# Patient Record
Sex: MALE | Race: BLACK OR AFRICAN AMERICAN | NOT HISPANIC OR LATINO | Employment: FULL TIME | URBAN - METROPOLITAN AREA
[De-identification: names, ages, dates, MRNs, and addresses within clinical notes are randomized per-mention and may not be internally consistent; named-entity substitution may affect disease eponyms.]

---

## 2018-01-12 NOTE — PROGRESS NOTES
Assessment    1  Encounter for preventive health examination (V70 0) (Z00 00)   2  BMI 29 0-29 9,adult (V85 25) (Z68 29)   3  Current every day smoker (305 1) (F17 200)    Plan  Health Maintenance    · Some eating tips that can help you lose weight ; Status:Complete;   Done: 78MTS5311   · We encourage all of our patients to exercise regularly  30 minutes of exercise or physical  activity five or more days a week is recommended for children and adults ;  Status:Complete;   Done: 11WSD1713   · We recommend you modify your diet to achieve and maintain a healthy weight  Being  underweight may increase your risk of developing health problems from vitamin and  mineral deficiencies  We recommend a balanced diet rich in fruits and vegetables  You  may also consider increasing your calorie intake by eating more frequently or adding  nuts, avocados, and low-fat cheese or milk to your meals  Please let us know  if you would like to learn more about your nutrition and calorie needs, and additional  options to help you achieve your weight goals ; Status:Complete;   Done: 37JVM6503  PMH: History of pneumonia    · Ventolin  (90 Base) MCG/ACT Inhalation Aerosol Solution; inhale 1 to 2  puffs every 6 hours as needed  SocHx: Current every day smoker    · You need to quit smoking ; Status:Complete;   Done: 71QPZ6980    Discussion/Summary  Impression: health maintenance visit  Currently, he eats a poor diet and has an adequate exercise regimen  The immunizations are up to date and as per pt - had Td 3 y ago  Advice and education were given regarding nutrition and weight loss  Rto prn  Chief Complaint  Annual PE w/forms  kss,cma      History of Present Illness  , Adult Male: The patient is being seen for a health maintenance evaluation  The last health maintenance visit was few year(s) ago  General Health: The patient's health since the last visit is described as good  He denies vision problems   He denies hearing loss  Immunizations status: up to date  Lifestyle:  He does not have a healthy diet  He has weight concerns  He exercises regularly  Screening:      Review of Systems    Constitutional: No fever or chills, feels well, no tiredness, no recent weight gain or weight loss  Eyes: No complaints of eye pain, no red eyes, no discharge from eyes, no itchy eyes  ENT: no complaints of earache, no hearing loss, no nosebleeds, no nasal discharge, no sore throat, no hoarseness  Cardiovascular: No complaints of slow heart rate, no fast heart rate, no chest pain, no palpitations, no leg claudication, no lower extremity  Respiratory: No complaints of shortness of breath, no wheezing, no cough, no SOB on exertion, no orthopnea or PND  Gastrointestinal: No complaints of abdominal pain, no constipation, no nausea or vomiting, no diarrhea or bloody stools  Genitourinary: No complaints of dysuria, no incontinence, no hesitancy, no nocturia, no genital lesion, no testicular pain  Musculoskeletal: No complaints of arthralgia, no myalgias, no joint swelling or stiffness, no limb pain or swelling  Integumentary: No complaints of skin rash or skin lesions, no itching, no skin wound, no dry skin  Neurological: No compliants of headache, no confusion, no convulsions, no numbness or tingling, no dizziness or fainting, no limb weakness, no difficulty walking  Psychiatric: Is not suicidal, no sleep disturbances, no anxiety or depression, no change in personality, no emotional problems  Endocrine: No complaints of proptosis, no hot flashes, no muscle weakness, no erectile dysfunction, no deepening of the voice, no feelings of weakness  Hematologic/Lymphatic: No complaints of swollen glands, no swollen glands in the neck, does not bleed easily, no easy bruising        Past Medical History    · History of Exercise-induced bronchospasm (061 81) (L20 718)    Surgical History    · History of Oral Surgery Tooth Extraction [place] [facility]    Family History    · Family history of diabetes mellitus (V18 0) (Z83 3)   · Family history of hypertension (V17 49) (Z82 49)    · Family history of malignant neoplasm of uterus (V16 49) (Z80 49)    · Family history of malignant neoplasm of breast (V16 3) (Z80 3)    Social History    · Current every day smoker (305 1) (F17 200)   · Current Smoker (305 1)    Current Meds   1  EpiPen 0 3 MG/0 3ML (1:1000) REGINA; as directed; Therapy: 34KZS3536 to  Requested for: 75ZLR6500 Recorded   2  Ventolin  (90 Base) MCG/ACT Inhalation Aerosol Solution; INHALE 2 PUFFS 3   TIMES DAILY AS NEEDED; Therapy: 85SLH2911 to (Last Rx:17Sep2012)  Requested for: 61NMO5081 Ordered    Allergies    1  No Known Drug Allergies    2  Bee sting    Vitals   Recorded: 44Zjn4664 09:18AM   Temperature 97 F   Heart Rate 80   Respiration 16   Systolic 437   Diastolic 76   Height 5 ft 8 5 in   Weight 199 lb 8 oz   BMI Calculated 29 89   BSA Calculated 2 05     Physical Exam    Constitutional   General appearance: No acute distress, well appearing and well nourished  overweight  Head and Face   Head and face: Normal     Eyes   Conjunctiva and lids: No erythema, swelling or discharge  Pupils and irises: Equal, round, reactive to light  Ears, Nose, Mouth, and Throat   Otoscopic examination: Tympanic membranes translucent with normal light reflex  Canals patent without erythema  Lips, teeth, and gums: Normal, good dentition  Oropharynx: Normal with no erythema, edema, exudate or lesions  Neck   Neck: Supple, symmetric, trachea midline, no masses  Thyroid: Normal, no thyromegaly  Pulmonary   Respiratory effort: No increased work of breathing or signs of respiratory distress  Auscultation of lungs: Clear to auscultation  Cardiovascular   Auscultation of heart: Normal rate and rhythm, normal S1 and S2, no murmurs      Examination of extremities for edema and/or varicosities: Normal     Abdomen Abdomen: Non-tender, no masses  Lymphatic   Palpation of lymph nodes in neck: No lymphadenopathy  Musculoskeletal   Gait and station: Normal     Inspection/palpation of joints, bones, and muscles: Normal     Skin   Skin and subcutaneous tissue: Normal without rashes or lesions  Neurologic   Cranial nerves: Cranial nerves 2-12 intact  Psychiatric   Judgment and insight: Normal     Mood and affect: Normal        Procedure    Procedure: Visual Acuity Test    Indication: routine screening  Inforrmation supplied by a Snellen chart     Results: 20/15 in both eyes without corrective device, 20/20 in the right eye without corrective device, 20/20 in the left eye without corrective device   Color vision was and the results were normal       Signatures   Electronically signed by : SRIRAM Malin ; Apr 13 2016 10:01AM EST                       (Author)

## 2018-06-06 ENCOUNTER — OFFICE VISIT (OUTPATIENT)
Dept: FAMILY MEDICINE CLINIC | Facility: CLINIC | Age: 33
End: 2018-06-06
Payer: COMMERCIAL

## 2018-06-06 VITALS
TEMPERATURE: 97.5 F | HEART RATE: 80 BPM | HEIGHT: 69 IN | BODY MASS INDEX: 34.51 KG/M2 | SYSTOLIC BLOOD PRESSURE: 120 MMHG | WEIGHT: 233 LBS | RESPIRATION RATE: 16 BRPM | DIASTOLIC BLOOD PRESSURE: 80 MMHG

## 2018-06-06 DIAGNOSIS — J03.90 TONSILLITIS: Primary | ICD-10-CM

## 2018-06-06 PROCEDURE — 99213 OFFICE O/P EST LOW 20 MIN: CPT | Performed by: FAMILY MEDICINE

## 2018-06-06 PROCEDURE — 3008F BODY MASS INDEX DOCD: CPT | Performed by: FAMILY MEDICINE

## 2018-06-06 PROCEDURE — 1036F TOBACCO NON-USER: CPT | Performed by: FAMILY MEDICINE

## 2018-06-06 RX ORDER — AMOXICILLIN 875 MG/1
875 TABLET, COATED ORAL 2 TIMES DAILY
Qty: 14 TABLET | Refills: 0 | Status: SHIPPED | OUTPATIENT
Start: 2018-06-06 | End: 2018-06-13

## 2018-06-06 RX ORDER — EPINEPHRINE 0.3 MG/.3ML
INJECTION SUBCUTANEOUS
COMMUNITY
Start: 2006-01-30 | End: 2021-09-23

## 2018-06-06 NOTE — PATIENT INSTRUCTIONS
Cigarette Smoking and Your Health   WHAT YOU NEED TO KNOW:   What are the risks to my health if I smoke tobacco?  Nicotine and other chemicals found in tobacco damage every cell in your body  Even if you are a light smoker, you have an increased risk for cancer, heart disease, and lung disease  If you are pregnant or have diabetes, smoking increases your risk for complications  What are the benefits to my health if I stop smoking? · You decrease respiratory symptoms such as coughing, wheezing, and shortness of breath  · You reduce your risk for cancers of the lung, mouth, throat, kidney, bladder, pancreas, stomach, and cervix  If you already have cancer, you increase the benefits of chemotherapy  You also reduce your risk for cancer returning or a second cancer from developing  · You reduce your risk for heart disease, blood clots, heart attack, and stroke  · You reduce your risk for lung infections, and diseases such as pneumonia, asthma, chronic bronchitis, and emphysema  · Your circulation improves  More oxygen can be delivered to your body  If you have diabetes, you lower your risk for complications, such as kidney, artery, and eye diseases  You also lower your risk for nerve damage  Nerve damage can lead to amputations, poor vision, and blindness  · You improve your body's ability to heal and to fight infections  What are the health benefits to others if I stop smoking? Tobacco is harmful to nonsmokers who breathe in your secondhand smoke  The following are ways the health of others around you may improve when you stop smoking:  · You lower the risks for lung cancer and heart disease in nonsmoking adults  · If you are pregnant, you lower the risk for miscarriage, early delivery, low birth weight, and stillbirth  You also lower your baby's risk for SIDS, obesity, developmental delay, and neurobehavioral problems, such as ADHD       · If you have children, you lower their risk for ear infections, colds, pneumonia, bronchitis, and asthma  Where can I find more information and support to stop smoking? · [employer]  Phone: 8- [phone]  Web Address: www [employer]  CARE AGREEMENT:   You have the right to help plan your care  Learn about your health condition and how it may be treated  Discuss treatment options with your caregivers to decide what care you want to receive  You always have the right to refuse treatment  The above information is an  only  It is not intended as medical advice for individual conditions or treatments  Talk to your doctor, nurse or pharmacist before following any medical regimen to see if it is safe and effective for you  © 2017 [address] Information is for End User's use only and may not be sold, redistributed or otherwise used for commercial purposes  All illustrations and images included in CareNotes® are the copyrighted property of A D [employer] M [employer] Inc  or Ajay [employer]el

## 2018-06-06 NOTE — PROGRESS NOTES
Chief Complaint   Patient presents with    Sore Throat     lump in throat         Patient ID: Michael Gomez is a 29 y o  male  HPI  Pt is seeing for L sided tonsil swelling on and off over last few y -  Last episode few m ago -  This episode started 1 wk ago -  No fever, no SOB, no cough -  No therapy tried     The following portions of the patient's history were reviewed and updated as appropriate: allergies, current medications, past family history, past medical history, past social history, past surgical history and problem list     Review of Systems   Constitutional: Negative  Respiratory: Negative  Gastrointestinal: Negative  Current Outpatient Prescriptions   Medication Sig Dispense Refill    EPINEPHrine (EPIPEN) 0 3 mg/0 3 mL SOAJ Inject into the shoulder, thigh, or buttocks      amoxicillin (AMOXIL) 875 mg tablet Take 1 tablet (875 mg total) by mouth 2 (two) times a day for 7 days 14 tablet 0     No current facility-administered medications for this visit  Objective:    /80 (BP Location: Left arm, Patient Position: Sitting, Cuff Size: Large)   Pulse 80   Temp 97 5 °F (36 4 °C) (Tympanic)   Resp 16   Ht 5' 8 5" (1 74 m)   Wt 106 kg (233 lb)   BMI 34 91 kg/m²        Physical Exam   Constitutional: No distress  HENT:   Right Ear: Tympanic membrane is bulging  Tympanic membrane is not erythematous  Left Ear: Tympanic membrane is bulging  Tympanic membrane is not erythematous  Mouth/Throat: Posterior oropharyngeal erythema present  No tonsillar abscesses  Assessment/Plan:         Diagnoses and all orders for this visit:    Tonsillitis  -     amoxicillin (AMOXIL) 875 mg tablet; Take 1 tablet (875 mg total) by mouth 2 (two) times a day for 7 days  -     Ambulatory Referral to Otolaryngology;  Future    Other orders  -     EPINEPHrine (EPIPEN) 0 3 mg/0 3 mL SOAJ; Inject into the shoulder, thigh, or buttocks        rto prn                     Christ Serna Emilia Marie MD

## 2019-05-06 ENCOUNTER — OFFICE VISIT (OUTPATIENT)
Dept: FAMILY MEDICINE CLINIC | Facility: CLINIC | Age: 34
End: 2019-05-06
Payer: COMMERCIAL

## 2019-05-06 VITALS
HEART RATE: 92 BPM | WEIGHT: 194 LBS | SYSTOLIC BLOOD PRESSURE: 120 MMHG | DIASTOLIC BLOOD PRESSURE: 80 MMHG | BODY MASS INDEX: 28.73 KG/M2 | RESPIRATION RATE: 16 BRPM | HEIGHT: 69 IN | TEMPERATURE: 99.4 F

## 2019-05-06 DIAGNOSIS — A09 TRAVELER'S DIARRHEA: Primary | ICD-10-CM

## 2019-05-06 PROCEDURE — 99213 OFFICE O/P EST LOW 20 MIN: CPT | Performed by: FAMILY MEDICINE

## 2019-05-06 RX ORDER — CIPROFLOXACIN 500 MG/1
500 TABLET, FILM COATED ORAL EVERY 12 HOURS SCHEDULED
Qty: 6 TABLET | Refills: 0 | Status: SHIPPED | OUTPATIENT
Start: 2019-05-06 | End: 2019-05-09

## 2021-09-23 ENCOUNTER — OFFICE VISIT (OUTPATIENT)
Dept: FAMILY MEDICINE CLINIC | Facility: CLINIC | Age: 36
End: 2021-09-23
Payer: COMMERCIAL

## 2021-09-23 VITALS
SYSTOLIC BLOOD PRESSURE: 138 MMHG | HEIGHT: 69 IN | BODY MASS INDEX: 29.77 KG/M2 | TEMPERATURE: 97.8 F | HEART RATE: 78 BPM | WEIGHT: 201 LBS | DIASTOLIC BLOOD PRESSURE: 78 MMHG | RESPIRATION RATE: 14 BRPM

## 2021-09-23 DIAGNOSIS — L80 PRIMARY VITILIGO: ICD-10-CM

## 2021-09-23 DIAGNOSIS — G43.809 OTHER MIGRAINE WITHOUT STATUS MIGRAINOSUS, NOT INTRACTABLE: ICD-10-CM

## 2021-09-23 DIAGNOSIS — B07.9 VIRAL WARTS, UNSPECIFIED TYPE: ICD-10-CM

## 2021-09-23 DIAGNOSIS — Z13.1 SCREENING FOR DIABETES MELLITUS: ICD-10-CM

## 2021-09-23 DIAGNOSIS — R03.0 ELEVATED BP WITHOUT DIAGNOSIS OF HYPERTENSION: Primary | ICD-10-CM

## 2021-09-23 DIAGNOSIS — K62.5 BRBPR (BRIGHT RED BLOOD PER RECTUM): ICD-10-CM

## 2021-09-23 PROCEDURE — 3725F SCREEN DEPRESSION PERFORMED: CPT | Performed by: FAMILY MEDICINE

## 2021-09-23 PROCEDURE — 4004F PT TOBACCO SCREEN RCVD TLK: CPT | Performed by: FAMILY MEDICINE

## 2021-09-23 PROCEDURE — 3008F BODY MASS INDEX DOCD: CPT | Performed by: FAMILY MEDICINE

## 2021-09-23 PROCEDURE — 99214 OFFICE O/P EST MOD 30 MIN: CPT | Performed by: FAMILY MEDICINE

## 2021-09-23 RX ORDER — SUMATRIPTAN 100 MG/1
100 TABLET, FILM COATED ORAL ONCE AS NEEDED
Qty: 9 TABLET | Refills: 1 | Status: SHIPPED | OUTPATIENT
Start: 2021-09-23 | End: 2021-12-16

## 2021-09-23 NOTE — PROGRESS NOTES
Chief Complaint   Patient presents with    Blood Pressure Check    Headache     blurred vision    Multiple Concerns        Patient ID: Walter Hernandez is a 31 y o  male  HPI  Pt is seeing for multiple issues -  elev BP at home for last 3 months -  In 140s/80-90s often - no therapy tried, no exercises, does not follow low Na diet -  Also has frequent HA -  1-2 times per wk for many years -  Taking OTC meds -  Episode may last several hours - ahs warts on R hand an T foot -  No therapy tried -  Over last year noticed intermittent episode of BRBPR  -  No constipation, no abd pain, no N/V     The following portions of the patient's history were reviewed and updated as appropriate: allergies, current medications, past family history, past medical history, past social history, past surgical history and problem list     Review of Systems   All other systems reviewed and are negative  No current outpatient medications on file  No current facility-administered medications for this visit  Objective:    /78 Comment: by MD  Pulse 78   Temp 97 8 °F (36 6 °C) (Temporal)   Resp 14   Ht 5' 9" (1 753 m)   Wt 91 2 kg (201 lb)   BMI 29 68 kg/m²        Physical Exam  Constitutional:       Appearance: Normal appearance  He is not ill-appearing  Cardiovascular:      Rate and Rhythm: Normal rate and regular rhythm  Heart sounds: No murmur heard  Pulmonary:      Effort: Pulmonary effort is normal  No respiratory distress  Breath sounds: No wheezing, rhonchi or rales  Musculoskeletal:      Right lower leg: No edema  Left lower leg: No edema  Skin:     Findings: Lesion (wart R hand ) present  Comments: Few < 5 mm hypopigmented spots on both hands    Neurological:      General: No focal deficit present  Mental Status: He is alert and oriented to person, place, and time  Labs in chart were reviewed        Assessment/Plan:         Diagnoses and all orders for this visit:    Elevated BP without diagnosis of hypertension  -     Comprehensive metabolic panel; Future  -     Lipid panel; Future  -     TSH, 3rd generation; Future  Low Na diet  Exercises daily   Viral warts, unspecified type  -     Ambulatory referral to Dermatology; Future    BRBPR (bright red blood per rectum)  -     Ambulatory referral to Gastroenterology; Future  -     CBC; Future    Screening for diabetes mellitus  -     Hemoglobin A1C; Future    Other migraine without status migrainosus, not intractable  -     SUMAtriptan (Imitrex) 100 mg tablet; Take 1 tablet (100 mg total) by mouth once as needed for migraine for up to 1 dose    Primary vitiligo            BMI Counseling: Body mass index is 29 68 kg/m²  Discussed the patient's BMI with him  The BMI is above normal  Nutrition recommendations include reducing portion sizes, decreasing overall calorie intake, 3-5 servings of fruits/vegetables daily and reducing fast food intake  Exercise recommendations include exercising 3-5 times per week           rto in 3 m         Nelson Torres MD

## 2021-10-01 LAB
ALBUMIN SERPL-MCNC: 4.1 G/DL (ref 4–5)
ALBUMIN/GLOB SERPL: 1.2 {RATIO} (ref 1.2–2.2)
ALP SERPL-CCNC: 84 IU/L (ref 44–121)
ALT SERPL-CCNC: 35 IU/L (ref 0–44)
AST SERPL-CCNC: 25 IU/L (ref 0–40)
BASOPHILS # BLD AUTO: 0.1 X10E3/UL (ref 0–0.2)
BASOPHILS NFR BLD AUTO: 1 %
BILIRUB SERPL-MCNC: 0.6 MG/DL (ref 0–1.2)
BUN SERPL-MCNC: 12 MG/DL (ref 6–20)
BUN/CREAT SERPL: 11 (ref 9–20)
CALCIUM SERPL-MCNC: 10 MG/DL (ref 8.7–10.2)
CHLORIDE SERPL-SCNC: 101 MMOL/L (ref 96–106)
CHOLEST SERPL-MCNC: 197 MG/DL (ref 100–199)
CO2 SERPL-SCNC: 24 MMOL/L (ref 20–29)
CREAT SERPL-MCNC: 1.09 MG/DL (ref 0.76–1.27)
EOSINOPHIL # BLD AUTO: 0.2 X10E3/UL (ref 0–0.4)
EOSINOPHIL NFR BLD AUTO: 2 %
ERYTHROCYTE [DISTWIDTH] IN BLOOD BY AUTOMATED COUNT: 12.8 % (ref 11.6–15.4)
GLOBULIN SER-MCNC: 3.4 G/DL (ref 1.5–4.5)
GLUCOSE SERPL-MCNC: 86 MG/DL (ref 65–99)
HBA1C MFR BLD: 5.8 % (ref 4.8–5.6)
HCT VFR BLD AUTO: 49.9 % (ref 37.5–51)
HDLC SERPL-MCNC: 36 MG/DL
HGB BLD-MCNC: 16.5 G/DL (ref 13–17.7)
IMM GRANULOCYTES # BLD: 0 X10E3/UL (ref 0–0.1)
IMM GRANULOCYTES NFR BLD: 0 %
LDLC SERPL CALC-MCNC: 133 MG/DL (ref 0–99)
LYMPHOCYTES # BLD AUTO: 3.2 X10E3/UL (ref 0.7–3.1)
LYMPHOCYTES NFR BLD AUTO: 44 %
MCH RBC QN AUTO: 28 PG (ref 26.6–33)
MCHC RBC AUTO-ENTMCNC: 33.1 G/DL (ref 31.5–35.7)
MCV RBC AUTO: 85 FL (ref 79–97)
MICRODELETION SYND BLD/T FISH: NORMAL
MONOCYTES # BLD AUTO: 0.8 X10E3/UL (ref 0.1–0.9)
MONOCYTES NFR BLD AUTO: 11 %
NEUTROPHILS # BLD AUTO: 3.1 X10E3/UL (ref 1.4–7)
NEUTROPHILS NFR BLD AUTO: 42 %
PLATELET # BLD AUTO: 299 X10E3/UL (ref 150–450)
POTASSIUM SERPL-SCNC: 5.1 MMOL/L (ref 3.5–5.2)
PROT SERPL-MCNC: 7.5 G/DL (ref 6–8.5)
RBC # BLD AUTO: 5.89 X10E6/UL (ref 4.14–5.8)
SL AMB EGFR AFRICAN AMERICAN: 102 ML/MIN/1.73
SL AMB EGFR NON AFRICAN AMERICAN: 88 ML/MIN/1.73
SL AMB VLDL CHOLESTEROL CALC: 28 MG/DL (ref 5–40)
SODIUM SERPL-SCNC: 139 MMOL/L (ref 134–144)
TRIGL SERPL-MCNC: 154 MG/DL (ref 0–149)
TSH SERPL DL<=0.005 MIU/L-ACNC: 0.91 UIU/ML (ref 0.45–4.5)
WBC # BLD AUTO: 7.4 X10E3/UL (ref 3.4–10.8)

## 2021-10-04 ENCOUNTER — TELEPHONE (OUTPATIENT)
Dept: FAMILY MEDICINE CLINIC | Facility: CLINIC | Age: 36
End: 2021-10-04

## 2021-11-05 ENCOUNTER — CONSULT (OUTPATIENT)
Dept: GASTROENTEROLOGY | Facility: CLINIC | Age: 36
End: 2021-11-05
Payer: COMMERCIAL

## 2021-11-05 VITALS
HEART RATE: 86 BPM | SYSTOLIC BLOOD PRESSURE: 115 MMHG | WEIGHT: 201.2 LBS | DIASTOLIC BLOOD PRESSURE: 88 MMHG | BODY MASS INDEX: 29.8 KG/M2 | HEIGHT: 69 IN | TEMPERATURE: 96.7 F

## 2021-11-05 DIAGNOSIS — K21.9 GASTROESOPHAGEAL REFLUX DISEASE, UNSPECIFIED WHETHER ESOPHAGITIS PRESENT: ICD-10-CM

## 2021-11-05 DIAGNOSIS — K62.5 BRBPR (BRIGHT RED BLOOD PER RECTUM): ICD-10-CM

## 2021-11-05 DIAGNOSIS — K52.9 CHRONIC DIARRHEA: Primary | ICD-10-CM

## 2021-11-05 DIAGNOSIS — R10.9 ABDOMINAL CRAMPING: ICD-10-CM

## 2021-11-05 PROCEDURE — 3008F BODY MASS INDEX DOCD: CPT | Performed by: PHYSICIAN ASSISTANT

## 2021-11-05 PROCEDURE — 4004F PT TOBACCO SCREEN RCVD TLK: CPT | Performed by: PHYSICIAN ASSISTANT

## 2021-11-05 PROCEDURE — 99204 OFFICE O/P NEW MOD 45 MIN: CPT | Performed by: PHYSICIAN ASSISTANT

## 2021-11-05 RX ORDER — CALCIUM CARBONATE 200(500)MG
1 TABLET,CHEWABLE ORAL DAILY PRN
COMMUNITY
End: 2021-12-16

## 2021-11-10 ENCOUNTER — TELEPHONE (OUTPATIENT)
Dept: GASTROENTEROLOGY | Facility: CLINIC | Age: 36
End: 2021-11-10

## 2021-12-02 ENCOUNTER — CONSULT (OUTPATIENT)
Dept: DERMATOLOGY | Age: 36
End: 2021-12-02
Payer: COMMERCIAL

## 2021-12-02 VITALS — TEMPERATURE: 96.8 F | WEIGHT: 203.2 LBS | HEIGHT: 71 IN | BODY MASS INDEX: 28.45 KG/M2

## 2021-12-02 DIAGNOSIS — D23.9 DERMATOFIBROMA: ICD-10-CM

## 2021-12-02 DIAGNOSIS — B07.9 VIRAL WARTS, UNSPECIFIED TYPE: ICD-10-CM

## 2021-12-02 DIAGNOSIS — L81.9 DYSCHROMIA: ICD-10-CM

## 2021-12-02 DIAGNOSIS — L73.2 HIDRADENITIS SUPPURATIVA OF LEFT AXILLA: Primary | ICD-10-CM

## 2021-12-02 DIAGNOSIS — L85.3 XEROSIS OF SKIN: ICD-10-CM

## 2021-12-02 PROCEDURE — 17110 DESTRUCTION B9 LES UP TO 14: CPT | Performed by: DERMATOLOGY

## 2021-12-02 PROCEDURE — 99202 OFFICE O/P NEW SF 15 MIN: CPT | Performed by: DERMATOLOGY

## 2021-12-16 ENCOUNTER — OFFICE VISIT (OUTPATIENT)
Dept: FAMILY MEDICINE CLINIC | Facility: CLINIC | Age: 36
End: 2021-12-16
Payer: COMMERCIAL

## 2021-12-16 VITALS
WEIGHT: 206 LBS | HEIGHT: 70 IN | TEMPERATURE: 96.5 F | BODY MASS INDEX: 29.49 KG/M2 | RESPIRATION RATE: 16 BRPM | SYSTOLIC BLOOD PRESSURE: 115 MMHG | DIASTOLIC BLOOD PRESSURE: 78 MMHG | HEART RATE: 76 BPM

## 2021-12-16 DIAGNOSIS — R03.0 ELEVATED BP WITHOUT DIAGNOSIS OF HYPERTENSION: Primary | ICD-10-CM

## 2021-12-16 DIAGNOSIS — M54.2 NECK DISCOMFORT: ICD-10-CM

## 2021-12-16 PROCEDURE — 99214 OFFICE O/P EST MOD 30 MIN: CPT | Performed by: FAMILY MEDICINE

## 2021-12-16 PROCEDURE — 3008F BODY MASS INDEX DOCD: CPT | Performed by: FAMILY MEDICINE

## 2021-12-16 PROCEDURE — 4004F PT TOBACCO SCREEN RCVD TLK: CPT | Performed by: FAMILY MEDICINE

## 2022-01-05 ENCOUNTER — TELEPHONE (OUTPATIENT)
Dept: PREADMISSION TESTING | Facility: HOSPITAL | Age: 37
End: 2022-01-05

## 2022-01-05 RX ORDER — ANTIARTHRITIC COMBINATION NO.2 900 MG
TABLET ORAL
COMMUNITY

## 2022-01-05 RX ORDER — MELATONIN
1000 DAILY
COMMUNITY

## 2022-01-05 RX ORDER — MULTIVIT WITH MINERALS/LUTEIN
1000 TABLET ORAL DAILY
COMMUNITY

## 2022-01-05 RX ORDER — TURMERIC 400 MG
CAPSULE ORAL
COMMUNITY

## 2022-01-05 NOTE — PRE-PROCEDURE INSTRUCTIONS
Pre-Surgery Instructions:   Medication Instructions    acetaminophen (TYLENOL) 325 mg tablet Patient was instructed by Physician and understands   Ascorbic Acid (vitamin C) 1000 MG tablet Patient was instructed by Physician and understands   cholecalciferol (VITAMIN D3) 1,000 units tablet Patient was instructed by Physician and understands   Multiple Vitamins-Minerals (Mens 50+ Advanced) CAPS Patient was instructed by Physician and understands   Turmeric 400 MG CAPS Patient was instructed by Physician and understands

## 2022-01-13 ENCOUNTER — HOSPITAL ENCOUNTER (OUTPATIENT)
Dept: GASTROENTEROLOGY | Facility: AMBULARY SURGERY CENTER | Age: 37
Setting detail: OUTPATIENT SURGERY
Discharge: HOME | End: 2022-01-13
Attending: INTERNAL MEDICINE | Admitting: INTERNAL MEDICINE
Payer: COMMERCIAL

## 2022-01-13 ENCOUNTER — ANESTHESIA (OUTPATIENT)
Dept: GASTROENTEROLOGY | Facility: AMBULARY SURGERY CENTER | Age: 37
End: 2022-01-13

## 2022-01-13 ENCOUNTER — ANESTHESIA EVENT (OUTPATIENT)
Dept: GASTROENTEROLOGY | Facility: AMBULARY SURGERY CENTER | Age: 37
End: 2022-01-13

## 2022-01-13 VITALS
TEMPERATURE: 96.7 F | DIASTOLIC BLOOD PRESSURE: 71 MMHG | OXYGEN SATURATION: 97 % | RESPIRATION RATE: 18 BRPM | HEIGHT: 69 IN | SYSTOLIC BLOOD PRESSURE: 125 MMHG | BODY MASS INDEX: 28.88 KG/M2 | WEIGHT: 195 LBS | HEART RATE: 91 BPM

## 2022-01-13 DIAGNOSIS — K21.9 GASTROESOPHAGEAL REFLUX DISEASE, UNSPECIFIED WHETHER ESOPHAGITIS PRESENT: ICD-10-CM

## 2022-01-13 DIAGNOSIS — K62.5 BRBPR (BRIGHT RED BLOOD PER RECTUM): ICD-10-CM

## 2022-01-13 DIAGNOSIS — K52.9 CHRONIC DIARRHEA: ICD-10-CM

## 2022-01-13 PROBLEM — I10 HTN (HYPERTENSION): Status: ACTIVE | Noted: 2022-01-13

## 2022-01-13 PROCEDURE — 88305 TISSUE EXAM BY PATHOLOGIST: CPT | Performed by: SPECIALIST

## 2022-01-13 PROCEDURE — 45380 COLONOSCOPY AND BIOPSY: CPT | Performed by: INTERNAL MEDICINE

## 2022-01-13 PROCEDURE — 43239 EGD BIOPSY SINGLE/MULTIPLE: CPT | Performed by: INTERNAL MEDICINE

## 2022-01-13 PROCEDURE — 88313 SPECIAL STAINS GROUP 2: CPT | Performed by: SPECIALIST

## 2022-01-13 RX ORDER — SODIUM CHLORIDE, SODIUM LACTATE, POTASSIUM CHLORIDE, CALCIUM CHLORIDE 600; 310; 30; 20 MG/100ML; MG/100ML; MG/100ML; MG/100ML
125 INJECTION, SOLUTION INTRAVENOUS CONTINUOUS
Status: DISCONTINUED | OUTPATIENT
Start: 2022-01-13 | End: 2022-01-17 | Stop reason: HOSPADM

## 2022-01-13 RX ORDER — PROPOFOL 10 MG/ML
INJECTION, EMULSION INTRAVENOUS AS NEEDED
Status: DISCONTINUED | OUTPATIENT
Start: 2022-01-13 | End: 2022-01-13

## 2022-01-13 RX ORDER — LIDOCAINE HYDROCHLORIDE 20 MG/ML
INJECTION, SOLUTION EPIDURAL; INFILTRATION; INTRACAUDAL; PERINEURAL AS NEEDED
Status: DISCONTINUED | OUTPATIENT
Start: 2022-01-13 | End: 2022-01-13

## 2022-01-13 RX ORDER — PROPOFOL 10 MG/ML
INJECTION, EMULSION INTRAVENOUS CONTINUOUS PRN
Status: DISCONTINUED | OUTPATIENT
Start: 2022-01-13 | End: 2022-01-13

## 2022-01-13 RX ORDER — GLYCOPYRROLATE 0.2 MG/ML
INJECTION INTRAMUSCULAR; INTRAVENOUS AS NEEDED
Status: DISCONTINUED | OUTPATIENT
Start: 2022-01-13 | End: 2022-01-13

## 2022-01-13 RX ORDER — ONDANSETRON 2 MG/ML
4 INJECTION INTRAMUSCULAR; INTRAVENOUS ONCE AS NEEDED
Status: CANCELLED | OUTPATIENT
Start: 2022-01-13

## 2022-01-13 RX ORDER — OMEPRAZOLE 40 MG/1
40 CAPSULE, DELAYED RELEASE ORAL DAILY
Qty: 30 CAPSULE | Refills: 5 | Status: SHIPPED | OUTPATIENT
Start: 2022-01-13 | End: 2022-01-26

## 2022-01-13 RX ADMIN — SODIUM CHLORIDE, SODIUM LACTATE, POTASSIUM CHLORIDE, AND CALCIUM CHLORIDE: .6; .31; .03; .02 INJECTION, SOLUTION INTRAVENOUS at 09:58

## 2022-01-13 RX ADMIN — LIDOCAINE HYDROCHLORIDE 100 MG: 20 INJECTION, SOLUTION EPIDURAL; INFILTRATION; INTRACAUDAL; PERINEURAL at 10:13

## 2022-01-13 RX ADMIN — PROPOFOL 130 MG: 10 INJECTION, EMULSION INTRAVENOUS at 10:13

## 2022-01-13 RX ADMIN — PROPOFOL 50 MG: 10 INJECTION, EMULSION INTRAVENOUS at 10:16

## 2022-01-13 RX ADMIN — GLYCOPYRROLATE 0.2 MG: 0.2 INJECTION, SOLUTION INTRAMUSCULAR; INTRAVENOUS at 10:13

## 2022-01-13 RX ADMIN — PROPOFOL 120 MCG/KG/MIN: 10 INJECTION, EMULSION INTRAVENOUS at 10:13

## 2022-01-13 NOTE — ANESTHESIA POSTPROCEDURE EVALUATION
Post-Op Assessment Note    CV Status:  Stable  Pain Score: 0    Pain management: adequate     Mental Status:  Alert and awake   Hydration Status:  Stable   PONV Controlled:  Controlled   Airway Patency:  Patent and adequate      Post Op Vitals Reviewed: Yes      Staff: CRNA         No complications documented      BP      Temp     Pulse     Resp      SpO2

## 2022-01-13 NOTE — ANESTHESIA PREPROCEDURE EVALUATION
Procedure:  COLONOSCOPY  EGD    Relevant Problems   ANESTHESIA (within normal limits)      CARDIO   (+) HTN (hypertension)      PULMONARY (within normal limits)        Physical Exam    Airway    Mallampati score: I  TM Distance: >3 FB  Neck ROM: full     Dental   No notable dental hx     Cardiovascular  Rhythm: regular,     Pulmonary  Breath sounds clear to auscultation,     Other Findings        Anesthesia Plan  ASA Score- 2     Anesthesia Type- IV sedation with anesthesia with ASA Monitors  Additional Monitors:   Airway Plan:           Plan Factors-Exercise tolerance (METS): >4 METS  Chart reviewed  Existing labs reviewed  Patient summary reviewed  Patient is not a current smoker  Induction-     Postoperative Plan-     Informed Consent- Anesthetic plan and risks discussed with patient  I personally reviewed this patient with the CRNA  Discussed and agreed on the Anesthesia Plan with the HIRA Jackson

## 2022-01-13 NOTE — H&P
History and Physical -  [facility]  Sonia Crowe 31 y o  male MRN: [id number]                  HPI: Sonia Crowe is a 31y o  year old male who presents for rectal bleeding, diarrhea, GERD      REVIEW OF SYSTEMS: Per the HPI, and otherwise unremarkable  Historical Information   Past Medical History:   Diagnosis Date    Bloody stool     GERD (gastroesophageal reflux disease)     Headaches, cluster     Hypertension     borderline high, not on meds currently   Irregular bowel habits     Irregular heart beat     palpitations, unusual chest discomfort   Dr Tylor Witt recommended    Stomach pain      Past Surgical History:   Procedure Laterality Date    WISDOM TOOTH EXTRACTION       Social History   Social History     Substance and Sexual Activity   Alcohol Use Yes    Comment: social     Social History     Substance and Sexual Activity   Drug Use Never     Social History     Tobacco Use   Smoking Status Current Every Day Smoker    Packs/day: 0 50    Years: 15 00    Pack years: 7 50   Smokeless Tobacco Current User     Family History   Problem Relation Age of Onset    Diabetes Mother     Hypertension Maternal Grandmother     Hypertension Maternal Uncle     Diabetes Maternal Uncle     Cancer Maternal Grandfather     Cancer Maternal Aunt        Meds/Allergies       Current Outpatient Medications:     acetaminophen (TYLENOL) 325 mg tablet    Ascorbic Acid (vitamin C) 1000 MG tablet    cholecalciferol (VITAMIN D3) 1,000 units tablet    Multiple Vitamins-Minerals (Mens 50+ Advanced) CAPS    Turmeric 400 MG CAPS    Current Facility-Administered Medications:     lactated ringers infusion, 125 mL/hr, Intravenous, Continuous    Allergies   Allergen Reactions    Bee Venom Anaphylaxis     Reaction Date: 30Jan2006;        Objective     /78   Pulse 82   Temp (!) 96 7 °F (35 9 °C) (Temporal)   Resp 16   Ht 5' 9" (1 753 m)   Wt 88 5 kg (195 lb)   SpO2 98%   BMI 28 80 kg/m² PHYSICAL EXAM    Gen: NAD  Head: NCAT  CV: RRR  CHEST: Clear  ABD: soft, NT/ND  EXT: no edema      ASSESSMENT/PLAN:  This is a 31y o  year old male here for EGD and colonoscopy, and he is stable and optimized for his procedure

## 2022-01-26 ENCOUNTER — TELEPHONE (OUTPATIENT)
Dept: GASTROENTEROLOGY | Facility: CLINIC | Age: 37
End: 2022-01-26

## 2022-01-26 DIAGNOSIS — A04.8 H. PYLORI INFECTION: Primary | ICD-10-CM

## 2022-01-26 RX ORDER — CLARITHROMYCIN 500 MG/1
500 TABLET, COATED ORAL EVERY 12 HOURS SCHEDULED
Qty: 28 TABLET | Refills: 0 | Status: SHIPPED | OUTPATIENT
Start: 2022-01-26 | End: 2022-02-09

## 2022-01-26 RX ORDER — AMOXICILLIN 500 MG/1
1000 CAPSULE ORAL EVERY 12 HOURS SCHEDULED
Qty: 56 CAPSULE | Refills: 0 | Status: SHIPPED | OUTPATIENT
Start: 2022-01-26 | End: 2022-02-09

## 2022-01-26 RX ORDER — OMEPRAZOLE 40 MG/1
40 CAPSULE, DELAYED RELEASE ORAL 2 TIMES DAILY
Qty: 28 CAPSULE | Refills: 0 | Status: SHIPPED | OUTPATIENT
Start: 2022-01-26 | End: 2022-06-30

## 2022-01-26 NOTE — TELEPHONE ENCOUNTER
Patient's wife came into office  Discussed treatment and sent to pharmacy  Advised it is okay to take vitamin  I will try to send 14 day Prilosec script however advised that if this is not covered he can take Prilosec 40 mg twice a day from his current prescription for 14 days  She also asked if family should be tested for infection  I advised that our guidelines do not currently recommend screening testing for family members if asymptomatic  She understands  ----- Message from David Castro LPN sent at 3/09/9426  8:10 AM EST -----  Regarding: FW: Dr Samantha Hicks  Please advise  ----- Message -----  From: Bety Bonner  Sent: 1/24/2022  10:16 PM EST  To: [facility]  Subject: Dr Dayday Montes De Oca Im not sure if I am supposed to respond to you or not but doctor gill left a message to me under lab results and I cant find a way to message her directly because she isnt on my care team  Could you please let her know to reach out to me  Or could you let her know that I havent taken any antibiotics lately and I have no known drug allergies  Would I stop taking the medicine she prescribed? And should I not take my daily vitamins while taking the antibiotics? Lastly should I have my family tested as well? My wife and children   Thank you    -Heber Pope

## 2022-03-31 ENCOUNTER — CONSULT (OUTPATIENT)
Dept: CARDIOLOGY CLINIC | Facility: CLINIC | Age: 37
End: 2022-03-31
Payer: COMMERCIAL

## 2022-03-31 VITALS
BODY MASS INDEX: 29.92 KG/M2 | DIASTOLIC BLOOD PRESSURE: 78 MMHG | OXYGEN SATURATION: 98 % | HEART RATE: 82 BPM | WEIGHT: 202 LBS | HEIGHT: 69 IN | SYSTOLIC BLOOD PRESSURE: 130 MMHG

## 2022-03-31 DIAGNOSIS — R03.0 ELEVATED BP WITHOUT DIAGNOSIS OF HYPERTENSION: Primary | ICD-10-CM

## 2022-03-31 DIAGNOSIS — R06.83 SNORING: ICD-10-CM

## 2022-03-31 DIAGNOSIS — R00.2 PALPITATIONS: ICD-10-CM

## 2022-03-31 DIAGNOSIS — R06.81 WITNESSED EPISODE OF APNEA: ICD-10-CM

## 2022-03-31 DIAGNOSIS — G47.20 ABNORMAL CIRCADIAN RHYTHM: ICD-10-CM

## 2022-03-31 DIAGNOSIS — R07.89 CHEST TIGHTNESS: ICD-10-CM

## 2022-03-31 DIAGNOSIS — F17.200 SMOKING: ICD-10-CM

## 2022-03-31 PROCEDURE — 93000 ELECTROCARDIOGRAM COMPLETE: CPT | Performed by: INTERNAL MEDICINE

## 2022-03-31 PROCEDURE — 99205 OFFICE O/P NEW HI 60 MIN: CPT | Performed by: INTERNAL MEDICINE

## 2022-03-31 NOTE — PROGRESS NOTES
[facility]  [address] [place] [address] [address]   [place]  Cardiology Consultation    Maribel Flynn  [id number]  1985      Consult for:  Elevated blood pressure/chest tightness  Appreciate consult by: Diony Denson MD    1  Elevated BP without diagnosis of hypertension  POCT ECG   2  Palpitations  POCT ECG   3  Chest tightness  POCT ECG      Discussion/Summary:   Elevated blood pressure/chest tightness- he has brought in blood pressure recordings that have been elevated  He reports having strong family history  He is trying to modify his sodium  Positive sleep screen her  Wife reports witnessed apnea, snoring, daytime fatigue  Recommend home sleep study  Dash diet information given  Plan for exercise stress test to evaluate blood pressure with exercise  Palpitations- periodic palpitations  Plan for extended Holter monitor    Smoking history- discussed smoking cessation      HPI:   53-year-old gentleman with strong family history for hypertension presents with periodic chest tightness and elevated blood pressure  His wife states he has apneic episodes at night  She is she at times is worried he is not breathing  He reports having some daytime fatigue  He has never been formally diagnosed with sleep disordered breathing  He is also noted elevated blood pressures at times  He is concerned as he has a strong family history  He periodically feels chest tightness  He is trying to modify his diet  Social Hx  Lives in [facility]  Diet- hard to cut back in house    Projects  Smoking- 1/2 pack day - difficult  Alcohol- seldom  Sleep - +snoring loud, stop breathing, wife noted apnea     Family Hx  Mom, aunt-htn      Past Medical History:   Diagnosis Date    Bloody stool     GERD (gastroesophageal reflux disease)     Headaches, cluster     Hypertension     borderline high, not on meds currently      Irregular bowel habits     Irregular heart beat     palpitations, unusual chest discomfort   Dr Molly Fish recommended    Stomach pain      Social History     Socioeconomic History    Marital status: /Civil Union     Spouse name: Not on file    Number of children: Not on file    Years of education: Not on file    Highest education level: Not on file   Occupational History    Not on file   Tobacco Use    Smoking status: Current Every Day Smoker     Packs/day: 0 50     Years: 15 00     Pack years: 7 50    Smokeless tobacco: Current User   Vaping Use    Vaping Use: Former    Start date: 3/31/2018    [facility] Quit date: 3/31/2019   Substance and Sexual Activity    Alcohol use: Yes     Comment: social    Drug use: Never    Sexual activity: Not on file   Other Topics Concern    Not on file   Social History Narrative    Not on file     Social Determinants of Health     Financial Resource Strain: Not on file   Food Insecurity: Not on file   Transportation Needs: Not on file   Physical Activity: Not on file   Stress: Not on file   Social Connections: Not on file   Intimate Partner Violence: Not on file   Housing Stability: Not on file      Family History   Problem Relation Age of Onset    Diabetes Mother     Hypertension Maternal Grandmother     Hypertension Maternal Uncle     Diabetes Maternal Uncle     Cancer Maternal Grandfather     Cancer Maternal Aunt      Past Surgical History:   Procedure Laterality Date    WISDOM TOOTH EXTRACTION         Current Outpatient Medications:     acetaminophen (TYLENOL) 325 mg tablet, Take 650 mg by mouth every 6 (six) hours as needed for mild pain, Disp: , Rfl:     Ascorbic Acid (vitamin C) 1000 MG tablet, Take 1,000 mg by mouth daily, Disp: , Rfl:     cholecalciferol (VITAMIN D3) 1,000 units tablet, Take 1,000 Units by mouth daily, Disp: , Rfl:     Multiple Vitamins-Minerals (Mens 50+ Advanced) CAPS, Take by mouth, Disp: , Rfl:     omeprazole (PriLOSEC) 40 MG capsule, Take 1 capsule (40 mg total) by mouth 2 (two) times a day for 14 days (Patient taking differently: Take 40 mg by mouth daily  ), Disp: 28 capsule, Rfl: 0    Turmeric 400 MG CAPS, Take by mouth, Disp: , Rfl:   Allergies   Allergen Reactions    Bee Venom Anaphylaxis     Reaction Date: [id number];      Vitals:    03/31/22 1405 03/31/22 1414   BP: 134/78 130/78   BP Location: Left arm Right arm   Patient Position: Sitting Sitting   Cuff Size: Standard Standard   Pulse: 82    SpO2: 98%    Weight: 91 6 kg (202 lb)    Height: 5' 9" (1 753 m)        Review of Systems:   Review of Systems   Constitutional: Positive for fatigue  HENT: Negative  Eyes: Negative  Respiratory: Positive for shortness of breath  Cardiovascular: Positive for chest pain and palpitations  Gastrointestinal: Negative  Endocrine: Negative  Genitourinary: Negative  Musculoskeletal: Negative  Skin: Negative  Allergic/Immunologic: Negative  Neurological: Positive for dizziness  Hematological: Negative  Psychiatric/Behavioral: Negative          Vitals:    03/31/22 1405 03/31/22 1414   BP: 134/78 130/78   BP Location: Left arm Right arm   Patient Position: Sitting Sitting   Cuff Size: Standard Standard   Pulse: 82    SpO2: 98%    Weight: 91 6 kg (202 lb)    Height: 5' 9" (1 753 m)      Physical Examination:   Physical Exam    Labs:     Lab Results   Component Value Date    WBC 7 4 09/30/2021    HGB 16 5 09/30/2021    HCT 49 9 09/30/2021    MCV 85 09/30/2021    RDW 12 8 09/30/2021     09/30/2021     BMP:  Lab Results   Component Value Date    SODIUM 139 09/30/2021    K 5 1 09/30/2021     09/30/2021    CO2 24 09/30/2021    BUN 12 09/30/2021    CREATININE 1 09 09/30/2021    GLUC 86 09/30/2021     LFT:  Lab Results   Component Value Date    AST 25 09/30/2021    ALT 35 09/30/2021    TP 7 5 09/30/2021    ALB 4 1 09/30/2021      No results found for: [facility]  Lab Results   Component Value Date    HGBA1C 5 8 (H) 09/30/2021     Lipid Profile:   Lab Results   Component Value Date    CHOLESTEROL 197 09/30/2021    HDL 36 (L) 09/30/2021    LDLCALC 133 (H) 09/30/2021    TRIG 154 (H) 09/30/2021     Lab Results   Component Value Date    CHOLESTEROL 197 09/30/2021     No results found for: CKTOTAL, CKMB, CKMBINDEX, TROPONINI  No results found for: NTBNP   No results found for this or any previous visit (from the past 672 hour(s))  Imaging & Testing   I have personally reviewed pertinent reports  Cardiac Testing     EKG: Personally reviewed  Normal sinus rhythm no acute ST T wave changes      Juan Laurent MD [facility]  [phone]  Please call with any questions or suggestions    Counseling :  A description of the counseling:   Goals and Barriers:  Patient's ability to self care:  Medication side effect reviewed with patient in detail and all their questions answered  "Portions of the record may have been created with voice recognition software  Occasional wrong word or "sound a like" substitutions may have occurred due to the inherent limitations of voice recognition software  Read the chart carefully and recognize, using context, where substitutions have occurred   Please call if you have any questions  "

## 2022-03-31 NOTE — PATIENT INSTRUCTIONS
Low-Sodium Diet   WHAT YOU NEED TO KNOW:   A low-sodium diet limits foods that are high in sodium (salt)  You will need to follow a low-sodium diet if you have high blood pressure, kidney disease, or heart failure  You may also need to follow this diet if you have a condition that is causing your body to retain (hold) extra fluid  You may need to limit the amount of sodium you eat in a day to 1,500 to 2,000 mg  Ask your healthcare provider how much sodium you can have each day  DISCHARGE INSTRUCTIONS:   How to use food labels to choose foods that are low in sodium:  Read food labels to find the amount of sodium they contain  The amount of sodium is listed in milligrams (mg)  The % Daily Value (DV) column tells you how much of your daily needs are met by 1 serving of the food for each nutrient listed  Choose foods that have less than 5% of the DV of sodium  These foods are considered low in sodium  Foods that have 20% or more of the DV of sodium are considered high in sodium  Some food labels may also list any of the following terms that tell you about the sodium content in the food:  · Sodium-free:  Less than 5 mg in each serving    · Very low sodium:  35 mg of sodium or less in each serving    · Low sodium:  140 mg of sodium or less in each serving    · Reduced sodium: At least 25% less sodium in each serving than the regular type    · Light in sodium:  50% less sodium in each serving    · Unsalted or no added salt:  No extra salt is added during processing (the food may still contain sodium)       Foods to avoid:  Salty foods are high in sodium  You should avoid the following:  · Processed foods:      ? Mixes for cornbread, biscuits, cake, and pudding     ? Instant foods, such as potatoes, cereals, noodles, and rice     ? Packaged foods, such as bread stuffing, rice and pasta mixes, snack dip mixes, and macaroni and cheese     ?  Canned foods, such as canned vegetables, soups, broths, sauces, and vegetable or tomato juice    ? Snack foods, such as salted chips, popcorn, pretzels, pork rinds, salted crackers, and salted nuts    ? Frozen foods, such as dinners, entrees, vegetables with sauces, and breaded meats    ? Sauerkraut, pickled vegetables, and other foods prepared in brine    · Meats and cheeses:      ? Smoked or cured meat, such as corned beef, isaac, ham, hot dogs, and sausage    ? Canned meats or spreads, such as potted meats, sardines, anchovies, and imitation seafood    ? Deli or lunch meats, such as bologna, ham, turkey, and roast beef    ? Processed cheese, such as American cheese and cheese spreads    · Condiments, sauces, and seasonings:      ? Salt (¼ teaspoon of salt contains 575 mg of sodium)    ? Seasonings made with salt, such as garlic salt, celery salt, onion salt, and seasoned salt    ? Regular soy sauce, barbecue sauce, teriyaki sauce, steak sauce, Worcestershire sauce, and most flavored vinegars    ? Canned gravy and mixes     ? Regular condiments, such as mustard, ketchup, and salad dressings    ? Pickles and olives    ? Meat tenderizers and monosodium glutamate (MSG)    Foods to include:  Read the food label to find the amount of sodium in each serving  · Bread and cereal:  Try to choose breads with less than 80 mg of sodium per serving  ? Bread, roll, vanessa, tortilla, or unsalted crackers  ? Ready-to-eat cereals with less than 5% DV of sodium (examples include shredded wheat and puffed rice)    ? Pasta    · Vegetables and fruits:      ? Unsalted fresh, frozen, or canned vegetables    ? Fresh, frozen, or canned fruits    ? Fruit juice    · Dairy:  One serving has about 150 mg of sodium  ? Milk, all types    ? Yogurt    ? Hard cheese, such as cheddar, Swiss, [employer], or mozzarella    · Meat and other protein foods:  Some raw meats may have added sodium  ? Plain meats, fish, and poultry     ? Egg    · Other foods:      ? Homemade pudding    ?  Unsalted nuts, popcorn, or pretzels    ? Unsalted butter or margarine    Ways to decrease sodium:   · Add spices and herbs to foods instead of salt during cooking  Use salt-free seasonings to add flavor to foods  Examples include onion powder, garlic powder, basil, huff powder, paprika, and parsley  Try lemon or lime juice or vinegar to give foods a tart flavor  Use hot peppers, pepper, or cayenne pepper to add a spicy flavor  · Do not keep a salt shaker at your kitchen table  This may help keep you from adding salt to food at the table  A teaspoon of salt has 2,300 mg of sodium  It may take time to get used to enjoying the natural flavor of food instead of adding salt  Talk to your healthcare provider before you use salt substitutes  Some salt substitutes have a high amount of potassium and need to be avoided if you have kidney disease  · Choose low-sodium foods at restaurants  Meals from restaurants are often high in sodium  Some restaurants have nutrition information on the menu that tells you the amount of sodium in their foods  If possible, ask for your food to be prepared with less, or no salt  · Shop for unsalted or low-sodium foods and snacks at the grocery store  Examples include unsalted or low-sodium broths, soups, and canned vegetables  Choose fresh or frozen vegetables instead  Choose unsalted nuts or seeds or fresh fruits or vegetables as snacks  Read food labels and choose salt-free, very low-sodium, or low-sodium foods  © Copyright [employer] 2022 Information is for End User's use only and may not be sold, redistributed or otherwise used for commercial purposes  All illustrations and images included in CareNotes® are the copyrighted property of A D A M , Inc  or [facility]   The above information is an  only  It is not intended as medical advice for individual conditions or treatments   Talk to your doctor, nurse or pharmacist before following any medical regimen to see if it is safe and effective for you  Heart Healthy Diet   WHAT YOU NEED TO KNOW:   A heart healthy diet is an eating plan low in unhealthy fats and sodium (salt)  The plan is high in healthy fats and fiber  A heart healthy diet helps improve your cholesterol levels and lowers your risk for heart disease and stroke  A dietitian will teach you how to read and understand food labels  DISCHARGE INSTRUCTIONS:   Heart healthy diet guidelines to follow:   · Choose foods that contain healthy fats  ? Unsaturated fats  include monounsaturated and polyunsaturated fats  Unsaturated fat is found in foods such as soybean, canola, olive, corn, and safflower oils  It is also found in soft tub margarine that is made with liquid vegetable oil  ? Omega-3 fat  is found in certain fish, such as salmon, tuna, and trout, and in walnuts and flaxseed  Eat fish high in omega-3 fats at least 2 times a week  · Get 20 to 30 grams of fiber each day  Fruits, vegetables, whole-grain foods, and legumes (cooked beans) are good sources of fiber  · Limit or do not have unhealthy fats  ? Cholesterol  is found in animal foods, such as eggs and lobster, and in dairy products made from whole milk  Limit cholesterol to less than 200 mg each day  ? Saturated fat  is found in meats, such as isaac and hamburger  It is also found in chicken or turkey skin, whole milk, and butter  Limit saturated fat to less than 7% of your total daily calories  ? Trans fat  is found in packaged foods, such as potato chips and cookies  It is also in hard margarine, some fried foods, and shortening  Do not eat foods that contain trans fats  · Limit sodium as directed  You may be told to limit sodium to 2,000 to 2,300 mg each day  Choose low-sodium or no-salt-added foods  Add little or no salt to food you prepare  Use herbs and spices in place of salt         Include the following in your heart healthy plan:  Ask your dietitian or healthcare provider how many servings to have from each of the following food groups:  · Grains:      ? Whole-wheat breads, cereals, and pastas, and brown rice    ? Low-fat, low-sodium crackers and chips    · Vegetables:      ? Broccoli, green beans, green peas, and spinach    ? Collards, kale, and lima beans    ? Carrots, sweet potatoes, tomatoes, and peppers    ? Canned vegetables with no salt added    · Fruits:      ? Bananas, peaches, pears, and pineapple    ? Grapes, raisins, and dates    ? Oranges, tangerines, grapefruit, orange juice, and grapefruit juice    ? Apricots, mangoes, melons, and papaya    ? Raspberries and strawberries    ? Canned fruit with no added sugar    · Low-fat dairy:      ? Nonfat (skim) milk, 1% milk, and low-fat almond, cashew, or soy milks fortified with calcium    ? Low-fat cheese, regular or frozen yogurt, and cottage cheese    · Meats and proteins:      ? Lean cuts of beef and pork (loin, leg, round), skinless chicken and turkey    ? Legumes, soy products, egg whites, or nuts    Limit or do not include the following in your heart healthy plan:   · Unhealthy fats and oils:      ? Whole or 2% milk, cream cheese, sour cream, or cheese    ? High-fat cuts of beef (T-bone steaks, ribs), chicken or turkey with skin, and organ meats such as liver    ? Butter, stick margarine, shortening, and cooking oils such as coconut or palm oil    · Foods and liquids high in sodium:      ? Packaged foods, such as frozen dinners, cookies, macaroni and cheese, and cereals with more than 300 mg of sodium per serving    ? Vegetables with added sodium, such as instant potatoes, vegetables with added sauces, or regular canned vegetables    ? Cured or smoked meats, such as hot dogs, isaac, and sausage    ? High-sodium ketchup, barbecue sauce, salad dressing, pickles, olives, soy sauce, or miso    · Foods and liquids high in sugar:      ? Candy, cake, cookies, pies, or doughnuts    ?  Soft drinks (soda), sports drinks, or sweetened tea    ? Canned or dry mixes for cakes, soups, sauces, or gravies    Other healthy heart guidelines:   · Do not smoke  Nicotine and other chemicals in cigarettes and cigars can cause lung and heart damage  Ask your healthcare provider for information if you currently smoke and need help to quit  E-cigarettes or smokeless tobacco still contain nicotine  Talk to your healthcare provider before you use these products  · Limit or do not drink alcohol as directed  Alcohol can damage your heart and raise your blood pressure  Your healthcare provider may give you specific daily and weekly limits  The general recommended limit is 1 drink a day for women 21 or older and for men 72 or older  Do not have more than 3 drinks in a day or 7 in a week  The recommended limit is 2 drinks a day for men 24to 59years of age  Do not have more than 4 drinks in a day or 14 in a week  A drink of alcohol is 12 ounces of beer, 5 ounces of wine, or 1½ ounces of liquor  · Exercise regularly  Exercise can help you maintain a healthy weight and improve your blood pressure and cholesterol levels  Regular exercise can also decrease your risk for heart problems  Ask your healthcare provider about the best exercise plan for you  Do not start an exercise program without asking your healthcare provider  Follow up with your doctor or cardiologist as directed:  Write down your questions so you remember to ask them during your visits  © [employer] 2022 Information is for End User's use only and may not be sold, redistributed or otherwise used for commercial purposes  All illustrations and images included in CareNotes® are the copyrighted property of A D A M , Inc  or [address] [place], [place] [address]   The above information is an  only  It is not intended as medical advice for individual conditions or treatments   Talk to your doctor, nurse or pharmacist before following any medical regimen to see if it is safe and effective for you

## 2022-04-12 ENCOUNTER — HOSPITAL ENCOUNTER (OUTPATIENT)
Dept: SLEEP CENTER | Facility: CLINIC | Age: 37
Discharge: HOME | End: 2022-04-12
Payer: COMMERCIAL

## 2022-04-12 DIAGNOSIS — R06.83 SNORING: ICD-10-CM

## 2022-04-12 DIAGNOSIS — R06.81 WITNESSED EPISODE OF APNEA: ICD-10-CM

## 2022-04-12 DIAGNOSIS — G47.20 ABNORMAL CIRCADIAN RHYTHM: ICD-10-CM

## 2022-04-12 PROCEDURE — G0399 HOME SLEEP TEST/TYPE 3 PORTA: HCPCS

## 2022-04-13 NOTE — PROGRESS NOTES
Home Sleep Study Documentation    Pre-Sleep Home Study:    Set-up and instructions performed by: SHANTEL Bonds    Technician performed demonstration for Patient: yes    Return demonstration performed by Patient: yes    Written instructions provided to Patient: yes    Patient signed consent form: yes        Post-Sleep Home Study:    Additional comments by Patient: none    Home Sleep Study Failed:no:    Failure reason: N/A    Reported or Detected: N/A    Scored by: MARY Berkowitz, SHANTEL

## 2022-04-14 ENCOUNTER — TELEPHONE (OUTPATIENT)
Dept: CARDIOLOGY CLINIC | Facility: CLINIC | Age: 37
End: 2022-04-14

## 2022-04-14 ENCOUNTER — HOSPITAL ENCOUNTER (OUTPATIENT)
Dept: NON INVASIVE DIAGNOSTICS | Facility: HOSPITAL | Age: 37
Discharge: HOME | End: 2022-04-14
Payer: COMMERCIAL

## 2022-04-14 VITALS — WEIGHT: 197 LBS | BODY MASS INDEX: 29.18 KG/M2 | HEIGHT: 69 IN

## 2022-04-14 DIAGNOSIS — R00.2 PALPITATIONS: ICD-10-CM

## 2022-04-14 DIAGNOSIS — R07.89 CHEST TIGHTNESS: ICD-10-CM

## 2022-04-14 DIAGNOSIS — G47.20 ABNORMAL CIRCADIAN RHYTHM: ICD-10-CM

## 2022-04-14 DIAGNOSIS — R03.0 ELEVATED BP WITHOUT DIAGNOSIS OF HYPERTENSION: ICD-10-CM

## 2022-04-14 DIAGNOSIS — R06.83 SNORING: ICD-10-CM

## 2022-04-14 DIAGNOSIS — G47.20 ABNORMAL CIRCADIAN RHYTHM: Primary | ICD-10-CM

## 2022-04-14 LAB
BASELINE ST DEPRESSION: 0 MM
CHEST PAIN STATEMENT: NORMAL
MAX DIASTOLIC BP: 80 MMHG
MAX HEART RATE: 162 BPM
MAX HR PERCENT: 87 %
MAX PREDICTED HEART RATE: 186 BPM
MAX. SYSTOLIC BP: 190 MMHG
PROTOCOL NAME: NORMAL
RATE PRESSURE PRODUCT: NORMAL
REASON FOR TERMINATION: NORMAL
SL CV STRESS RECOVERY BP: NORMAL MMHG
SL CV STRESS RECOVERY HR: 93 BPM
SL CV STRESS RECOVERY O2 SAT: 98 %
SL CV STRESS STAGE REACHED: 4
STRESS ANGINA INDEX: 0
STRESS BASELINE BP: NORMAL MMHG
STRESS BASELINE HR: 79 BPM
STRESS DUKE TREADMILL SCORE: 12
STRESS O2 SAT REST: 98 %
STRESS PEAK HR: 162 BPM
STRESS PERCENT HR: 87 %
STRESS POST ESTIMATED WORKLOAD: 13.4 METS
STRESS POST EXERCISE DUR MIN: 12 MIN
STRESS POST O2 SAT PEAK: 98 %
STRESS POST PEAK BP: 190 MMHG
STRESS ST DEPRESSION: 0 MM
STRESS TARGET HR: 162 BPM
TARGET HR FORMULA: NORMAL
TEST INDICATION: NORMAL
TIME IN EXERCISE PHASE: NORMAL

## 2022-04-14 PROCEDURE — 93018 CV STRESS TEST I&R ONLY: CPT | Performed by: INTERNAL MEDICINE

## 2022-04-14 PROCEDURE — 93016 CV STRESS TEST SUPVJ ONLY: CPT | Performed by: INTERNAL MEDICINE

## 2022-04-14 PROCEDURE — 93017 CV STRESS TEST TRACING ONLY: CPT

## 2022-04-14 NOTE — TELEPHONE ENCOUNTER
----- Message from Francisco Woods MD sent at 4/14/2022 12:12 PM EDT -----  His sleep study was very abnormal   He has at least moderate sleep disordered breathing  His lowest oxygen saturation was 70% at night  I highly recommend follow-up with sleep medicine  Op[facility]   His abnormal sleep may be a likely etiology for his cardiac   symptoms

## 2022-04-18 ENCOUNTER — HOSPITAL ENCOUNTER (EMERGENCY)
Facility: HOSPITAL | Age: 37
Discharge: HOME | End: 2022-04-19
Attending: EMERGENCY MEDICINE
Payer: COMMERCIAL

## 2022-04-18 ENCOUNTER — APPOINTMENT (EMERGENCY)
Dept: RADIOLOGY | Facility: HOSPITAL | Age: 37
End: 2022-04-18
Payer: COMMERCIAL

## 2022-04-18 ENCOUNTER — OFFICE VISIT (OUTPATIENT)
Dept: URGENT CARE | Facility: CLINIC | Age: 37
End: 2022-04-18
Payer: COMMERCIAL

## 2022-04-18 ENCOUNTER — TELEPHONE (OUTPATIENT)
Dept: CARDIOLOGY CLINIC | Facility: CLINIC | Age: 37
End: 2022-04-18

## 2022-04-18 VITALS
RESPIRATION RATE: 18 BRPM | DIASTOLIC BLOOD PRESSURE: 89 MMHG | SYSTOLIC BLOOD PRESSURE: 140 MMHG | HEART RATE: 67 BPM | WEIGHT: 200 LBS | TEMPERATURE: 97.1 F | BODY MASS INDEX: 30.31 KG/M2 | HEIGHT: 68 IN | OXYGEN SATURATION: 99 %

## 2022-04-18 VITALS — TEMPERATURE: 99.3 F | RESPIRATION RATE: 16 BRPM | HEART RATE: 81 BPM | OXYGEN SATURATION: 100 %

## 2022-04-18 DIAGNOSIS — R10.9 ABDOMINAL PAIN: Primary | ICD-10-CM

## 2022-04-18 DIAGNOSIS — R10.12 LEFT UPPER QUADRANT ABDOMINAL PAIN: Primary | ICD-10-CM

## 2022-04-18 LAB
ALBUMIN SERPL BCP-MCNC: 4.2 G/DL (ref 3.5–5)
ALP SERPL-CCNC: 92 U/L (ref 46–116)
ALT SERPL W P-5'-P-CCNC: 49 U/L (ref 12–78)
ANION GAP SERPL CALCULATED.3IONS-SCNC: 9 MMOL/L (ref 4–13)
APTT PPP: 28 SECONDS (ref 23–37)
AST SERPL W P-5'-P-CCNC: 20 U/L (ref 5–45)
BASOPHILS # BLD AUTO: 0.08 THOUSANDS/ΜL (ref 0–0.1)
BASOPHILS NFR BLD AUTO: 1 % (ref 0–1)
BILIRUB SERPL-MCNC: 0.34 MG/DL (ref 0.2–1)
BUN SERPL-MCNC: 11 MG/DL (ref 5–25)
CALCIUM SERPL-MCNC: 9.2 MG/DL (ref 8.3–10.1)
CARDIAC TROPONIN I PNL SERPL HS: 2 NG/L
CHLORIDE SERPL-SCNC: 102 MMOL/L (ref 100–108)
CO2 SERPL-SCNC: 29 MMOL/L (ref 21–32)
CREAT SERPL-MCNC: 1.12 MG/DL (ref 0.6–1.3)
EOSINOPHIL # BLD AUTO: 0.18 THOUSAND/ΜL (ref 0–0.61)
EOSINOPHIL NFR BLD AUTO: 2 % (ref 0–6)
ERYTHROCYTE [DISTWIDTH] IN BLOOD BY AUTOMATED COUNT: 13.1 % (ref 11.6–15.1)
GFR SERPL CREATININE-BSD FRML MDRD: 85 ML/MIN/1.73SQ M
GLUCOSE SERPL-MCNC: 76 MG/DL (ref 65–140)
HCT VFR BLD AUTO: 54.1 % (ref 36.5–49.3)
HGB BLD-MCNC: 17 G/DL (ref 12–17)
IMM GRANULOCYTES # BLD AUTO: 0.02 THOUSAND/UL (ref 0–0.2)
IMM GRANULOCYTES NFR BLD AUTO: 0 % (ref 0–2)
INR PPP: 1.02 (ref 0.84–1.19)
LIPASE SERPL-CCNC: 94 U/L (ref 73–393)
LYMPHOCYTES # BLD AUTO: 4.65 THOUSANDS/ΜL (ref 0.6–4.47)
LYMPHOCYTES NFR BLD AUTO: 56 % (ref 14–44)
MCH RBC QN AUTO: 27.5 PG (ref 26.8–34.3)
MCHC RBC AUTO-ENTMCNC: 31.4 G/DL (ref 31.4–37.4)
MCV RBC AUTO: 87 FL (ref 82–98)
MONOCYTES # BLD AUTO: 0.67 THOUSAND/ΜL (ref 0.17–1.22)
MONOCYTES NFR BLD AUTO: 8 % (ref 4–12)
NEUTROPHILS # BLD AUTO: 2.81 THOUSANDS/ΜL (ref 1.85–7.62)
NEUTS SEG NFR BLD AUTO: 33 % (ref 43–75)
NRBC BLD AUTO-RTO: 0 /100 WBCS
PLATELET # BLD AUTO: 313 THOUSANDS/UL (ref 149–390)
PMV BLD AUTO: 10.5 FL (ref 8.9–12.7)
POTASSIUM SERPL-SCNC: 4.2 MMOL/L (ref 3.5–5.3)
PROT SERPL-MCNC: 8.4 G/DL (ref 6.4–8.2)
PROTHROMBIN TIME: 13.2 SECONDS (ref 11.6–14.5)
RBC # BLD AUTO: 6.19 MILLION/UL (ref 3.88–5.62)
SODIUM SERPL-SCNC: 140 MMOL/L (ref 136–145)
WBC # BLD AUTO: 8.41 THOUSAND/UL (ref 4.31–10.16)

## 2022-04-18 PROCEDURE — 84484 ASSAY OF TROPONIN QUANT: CPT | Performed by: EMERGENCY MEDICINE

## 2022-04-18 PROCEDURE — 83690 ASSAY OF LIPASE: CPT | Performed by: EMERGENCY MEDICINE

## 2022-04-18 PROCEDURE — 85610 PROTHROMBIN TIME: CPT | Performed by: EMERGENCY MEDICINE

## 2022-04-18 PROCEDURE — 93005 ELECTROCARDIOGRAM TRACING: CPT

## 2022-04-18 PROCEDURE — 74177 CT ABD & PELVIS W/CONTRAST: CPT

## 2022-04-18 PROCEDURE — 85025 COMPLETE CBC W/AUTO DIFF WBC: CPT | Performed by: EMERGENCY MEDICINE

## 2022-04-18 PROCEDURE — 36415 COLL VENOUS BLD VENIPUNCTURE: CPT | Performed by: EMERGENCY MEDICINE

## 2022-04-18 PROCEDURE — 96374 THER/PROPH/DIAG INJ IV PUSH: CPT

## 2022-04-18 PROCEDURE — 85730 THROMBOPLASTIN TIME PARTIAL: CPT | Performed by: EMERGENCY MEDICINE

## 2022-04-18 PROCEDURE — 80053 COMPREHEN METABOLIC PANEL: CPT | Performed by: EMERGENCY MEDICINE

## 2022-04-18 PROCEDURE — 99213 OFFICE O/P EST LOW 20 MIN: CPT | Performed by: PHYSICIAN ASSISTANT

## 2022-04-18 PROCEDURE — 99285 EMERGENCY DEPT VISIT HI MDM: CPT | Performed by: EMERGENCY MEDICINE

## 2022-04-18 PROCEDURE — G1004 CDSM NDSC: HCPCS

## 2022-04-18 PROCEDURE — 99284 EMERGENCY DEPT VISIT MOD MDM: CPT

## 2022-04-18 RX ORDER — KETOROLAC TROMETHAMINE 30 MG/ML
15 INJECTION, SOLUTION INTRAMUSCULAR; INTRAVENOUS ONCE
Status: COMPLETED | OUTPATIENT
Start: 2022-04-18 | End: 2022-04-18

## 2022-04-18 RX ORDER — HYDROMORPHONE HCL/PF 1 MG/ML
0.5 SYRINGE (ML) INJECTION ONCE
Status: DISCONTINUED | OUTPATIENT
Start: 2022-04-18 | End: 2022-04-19

## 2022-04-18 RX ADMIN — KETOROLAC TROMETHAMINE 15 MG: 30 INJECTION, SOLUTION INTRAMUSCULAR at 22:50

## 2022-04-18 RX ADMIN — IOHEXOL 100 ML: 350 INJECTION, SOLUTION INTRAVENOUS at 23:28

## 2022-04-18 NOTE — TELEPHONE ENCOUNTER
Patient's wife called today with reports of chest pain, trouble breathing, and took off of work today  Seeking an appointment asap  I did advise that the availability was pretty slim as per MR  Patient was given appt for Thursday at 420PM as an option  Would like to take that appointment and be put on the list incase of cancellation       WAS ADVISED TO GO TO ER

## 2022-04-18 NOTE — PROGRESS NOTES
[facility]        NAME: Adalid Tatum is a 31 y o  male  : 1985    MRN: [id number]  DATE: 2022  TIME: 6:58 PM    Assessment and Plan   Left upper quadrant abdominal pain [R10 12]  1  Left upper quadrant abdominal pain  Transfer to other facility         Patient Instructions     Patient Instructions   Recommend patient be seen and evaluated in ER for left upper quadrant pain  Patient is refusing transfer via ambulance and is going to have his wife drive him there  Differential diagnosis includes but is not limited to pancreatitis, gastroenteritis, kidney stone, cholecystitis, GERD  All patient's questions answered and patient is agreeable with this plan  Follow up with PCP in 3-5 days  Proceed to  ER if symptoms worsen  Chief Complaint     Chief Complaint   Patient presents with    Pain     pt presents with left sided rib pain started two days ago, worsened yesterday; History of Present Illness       Patient is a 62-year-old male presenting today with left upper quadrant pain x2 days  Patient notes couple days ago he began to develop some pain and discomfort of his left side, notes that the pain has been increasing in severity since onset, describes the pain as sharp and achy, notes that it is constant, made worse with positional changes suggest twisting and bending and palpation to area  Patient notes he was treated for an H pylori infection couple months ago, notes symptoms feel different from prior infection  Denies any trauma or injury to the area  Notes he has still been eating and drinking but less than normal due to discomfort, has been passing gas and had a normal bowel movement yesterday  Denies fever, chills, vomiting, diarrhea, lightheadedness, dizziness  Review of Systems   Review of Systems   Constitutional: Negative for chills and fever  HENT: Negative for ear pain and sore throat  Eyes: Negative for pain and visual disturbance  Respiratory: Negative for cough and shortness of breath  Cardiovascular: Negative for chest pain and palpitations  Gastrointestinal: Positive for abdominal pain and nausea  Negative for vomiting  Genitourinary: Negative for dysuria and hematuria  Musculoskeletal: Negative for arthralgias and back pain  Skin: Negative for color change and rash  Neurological: Negative for seizures and syncope  All other systems reviewed and are negative  Current Medications       Current Outpatient Medications:     Ascorbic Acid (vitamin C) 1000 MG tablet, Take 1,000 mg by mouth daily, Disp: , Rfl:     cholecalciferol (VITAMIN D3) 1,000 units tablet, Take 1,000 Units by mouth daily, Disp: , Rfl:     Multiple Vitamins-Minerals (Mens 50+ Advanced) CAPS, Take by mouth, Disp: , Rfl:     Turmeric 400 MG CAPS, Take by mouth, Disp: , Rfl:     acetaminophen (TYLENOL) 325 mg tablet, Take 650 mg by mouth every 6 (six) hours as needed for mild pain (Patient not taking: Reported on 4/18/2022 ), Disp: , Rfl:     omeprazole (PriLOSEC) 40 MG capsule, Take 1 capsule (40 mg total) by mouth 2 (two) times a day for 14 days (Patient taking differently: Take 40 mg by mouth daily  ), Disp: 28 capsule, Rfl: 0    Current Allergies     Allergies as of 04/18/2022 - Reviewed 04/18/2022   Allergen Reaction Noted    Bee venom Anaphylaxis 01/30/2006            The following portions of the patient's history were reviewed and updated as appropriate: allergies, current medications, past family history, past medical history, past social history, past surgical history and problem list      Past Medical History:   Diagnosis Date    Bloody stool     GERD (gastroesophageal reflux disease)     Headaches, cluster     Hypertension     borderline high, not on meds currently   Irregular bowel habits     Irregular heart beat     palpitations, unusual chest discomfort   Dr Jes Luis recommended    Stomach pain        Past Surgical History: Procedure Laterality Date    WISDOM TOOTH EXTRACTION         Family History   Problem Relation Age of Onset    Diabetes Mother     Hypertension Maternal Grandmother     Hypertension Maternal Uncle     Diabetes Maternal Uncle     Cancer Maternal Grandfather     Cancer Maternal Aunt          Medications have been verified  Objective   Pulse 81   Temp 99 3 °F (37 4 °C)   Resp 16   SpO2 100%        Physical Exam     Physical Exam  Vitals reviewed  Constitutional:       Appearance: He is not toxic-appearing  Comments: Patient appears in mild discomfort holding his left side   HENT:      Head: Normocephalic and atraumatic  Right Ear: Tympanic membrane, ear canal and external ear normal       Left Ear: Tympanic membrane, ear canal and external ear normal       Nose: Nose normal       Mouth/Throat:      Mouth: Mucous membranes are moist       Pharynx: Oropharynx is clear  Eyes:      Conjunctiva/sclera: Conjunctivae normal    Cardiovascular:      Rate and Rhythm: Normal rate and regular rhythm  Pulses: Normal pulses  Heart sounds: Normal heart sounds  Pulmonary:      Effort: Pulmonary effort is normal       Breath sounds: Normal breath sounds  Abdominal:      General: Abdomen is flat  Bowel sounds are normal       Palpations: Abdomen is soft  Tenderness: There is abdominal tenderness in the epigastric area and left upper quadrant  There is guarding  There is no right CVA tenderness, left CVA tenderness or rebound  Lymphadenopathy:      Cervical: No cervical adenopathy  Skin:     General: Skin is warm  Capillary Refill: Capillary refill takes less than 2 seconds  Neurological:      General: No focal deficit present  Mental Status: He is alert and oriented to person, place, and time

## 2022-04-18 NOTE — PATIENT INSTRUCTIONS
Recommend patient be seen and evaluated in ER for left upper quadrant pain  Patient is refusing transfer via ambulance and is going to have his wife drive him there  Differential diagnosis includes but is not limited to pancreatitis, gastroenteritis, kidney stone, cholecystitis, GERD  All patient's questions answered and patient is agreeable with this plan

## 2022-04-19 RX ORDER — NAPROXEN 500 MG/1
500 TABLET ORAL 2 TIMES DAILY WITH MEALS
Qty: 14 TABLET | Refills: 0 | Status: SHIPPED | OUTPATIENT
Start: 2022-04-19 | End: 2022-04-26

## 2022-04-19 RX ORDER — FAMOTIDINE 20 MG/1
20 TABLET, FILM COATED ORAL 2 TIMES DAILY
Qty: 20 TABLET | Refills: 0 | Status: SHIPPED | OUTPATIENT
Start: 2022-04-19 | End: 2022-04-28 | Stop reason: SDUPTHER

## 2022-04-19 NOTE — ED PROCEDURE NOTE
PROCEDURE  ECG 12 Lead Documentation Only    Date/Time: 4/18/2022 11:02 PM  Performed by: Aurora Cordero DO  Authorized by: Aurora Cordero DO     ECG reviewed by me, the ED Provider: yes    Patient location:  ED  Interpretation:     Interpretation: normal    Rate:     ECG rate:  66    ECG rate assessment: normal    Rhythm:     Rhythm: sinus rhythm    Ectopy:     Ectopy: none    ST segments:     ST segments:  Normal  T waves:     T waves: normal           Aurora Cordero DO  04/18/22 0630

## 2022-04-19 NOTE — DISCHARGE INSTRUCTIONS
Acute Abdominal Pain   WHAT YOU NEED TO KNOW:   Acute abdominal pain usually starts suddenly and gets worse quickly  DISCHARGE INSTRUCTIONS:   Seek care immediately if:   · You vomit blood or cannot stop vomiting  · You have blood in your bowel movement, or it looks like tar  · You have bleeding from your rectum  · Your abdomen is larger than usual, more painful, and hard  · You have severe pain in your abdomen  · You stop passing gas and having bowel movements  · You feel weak, dizzy, or faint  Call your doctor if:   · You have a fever  · You have new or worsening signs or symptoms  · Your symptoms do not get better with treatment  · You have questions or concerns about your condition or care  Medicines:   · Medicines  may be given to decrease pain, treat an infection, and manage your symptoms  · Take your medicine as directed  Contact your healthcare provider if you think your medicine is not helping or if you have side effects  Tell him or her if you are allergic to any medicine  Keep a list of the medicines, vitamins, and herbs you take  Include the amounts, and when and why you take them  Bring the list or the pill bottles to follow-up visits  Carry your medicine list with you in case of an emergency  Manage your symptoms:   · Apply heat  on your abdomen for 20 to 30 minutes every 2 hours for as many days as directed  Heat helps decrease pain and muscle spasms  · Make changes to the food you eat, if needed  Do not eat foods that cause abdominal pain or other symptoms  Eat small meals more often  The following changes may also help:    ? Eat more high-fiber foods if you are constipated  High-fiber foods include fruits, vegetables, whole-grain foods, and legumes  ? Do not eat foods that cause gas if you have bloating  Examples include broccoli, cabbage, and cauliflower  Do not drink soda or carbonated drinks  These may also cause gas      ? Do not eat foods or drinks that contain sorbitol or fructose if you have diarrhea and bloating  Some examples are fruit juices, candy, jelly, and sugar-free gum  ? Do not eat high-fat foods  Examples include fried foods, cheeseburgers, hot dogs, and desserts  ? Limit or do not have caffeine  Caffeine may make symptoms, such as heart burn or nausea, worse  ? Drink more liquids to prevent dehydration from diarrhea or vomiting  Ask your healthcare provider how much liquid to drink each day and which liquids are best for you  · Manage your stress  Stress may cause abdominal pain  Your healthcare provider may recommend relaxation techniques and deep breathing exercises to help decrease your stress  Your provider may recommend you talk to someone about your stress or anxiety, such as a counselor or a trusted friend  Get plenty of sleep and exercise regularly  · Limit or do not drink alcohol  Alcohol can make your abdominal pain worse  Ask your healthcare provider if it is okay for you to drink alcohol  Also ask how much is safe for you to drink  A drink of alcohol is 12 ounces of beer, ½ ounce of liquor, or 5 ounces of wine  · Do not smoke  Nicotine and other chemicals in cigarettes can damage your esophagus and stomach  Ask your healthcare provider for information if you currently smoke and need help to quit  E-cigarettes or smokeless tobacco still contain nicotine  Talk to your healthcare provider before you use these products  Follow up with your doctor as directed:  Write down your questions so you remember to ask them during your visits  © Copyright [employer] 2022 Information is for End User's use only and may not be sold, redistributed or otherwise used for commercial purposes  All illustrations and images included in CareNotes® are the copyrighted property of A D A M , Inc  or Formerly named [facility]   The above information is an  only   It is not intended as medical advice for individual conditions or treatments  Talk to your doctor, nurse or pharmacist before following any medical regimen to see if it is safe and effective for you

## 2022-04-20 LAB
ATRIAL RATE: 66 BPM
P AXIS: 47 DEGREES
PR INTERVAL: 186 MS
QRS AXIS: 30 DEGREES
QRSD INTERVAL: 76 MS
QT INTERVAL: 380 MS
QTC INTERVAL: 398 MS
T WAVE AXIS: 25 DEGREES
VENTRICULAR RATE: 66 BPM

## 2022-04-20 PROCEDURE — 93010 ELECTROCARDIOGRAM REPORT: CPT | Performed by: INTERNAL MEDICINE

## 2022-04-20 NOTE — ED PROVIDER NOTES
History  Chief Complaint   Patient presents with    Abdominal Pain     LUQ abd pain/rib pain, no injury to the area, pain getting worse over the last 2 days     Patient presents for evaluation of left upper quadrant/left lower rib pain  Denies any injury or trauma to the area  States the pain started 2 days ago and become more constant and got worse  Patient states it hurts worse when he moves and lays certain ways  No other apparent modifying factors for symptoms  Denies any change with food  Denies any nausea vomiting or diarrhea  History provided by:  Patient   used: No    Abdominal Pain  Associated symptoms: no chest pain, no chills, no cough, no dysuria, no fever, no hematuria, no nausea, no shortness of breath, no sore throat and no vomiting        Prior to Admission Medications   Prescriptions Last Dose Informant Patient Reported? Taking? Ascorbic Acid (vitamin C) 1000 MG tablet  Self Yes No   Sig: Take 1,000 mg by mouth daily   Multiple Vitamins-Minerals (Mens 50+ Advanced) CAPS  Self Yes No   Sig: Take by mouth   Turmeric 400 MG CAPS  Self Yes No   Sig: Take by mouth   acetaminophen (TYLENOL) 325 mg tablet  Self Yes No   Sig: Take 650 mg by mouth every 6 (six) hours as needed for mild pain   Patient not taking: Reported on 4/18/2022    cholecalciferol (VITAMIN D3) 1,000 units tablet  Self Yes No   Sig: Take 1,000 Units by mouth daily   omeprazole (PriLOSEC) 40 MG capsule  Self No No   Sig: Take 1 capsule (40 mg total) by mouth 2 (two) times a day for 14 days   Patient taking differently: Take 40 mg by mouth daily        Facility-Administered Medications: None       Past Medical History:   Diagnosis Date    Bloody stool     GERD (gastroesophageal reflux disease)     Headaches, cluster     Hypertension     borderline high, not on meds currently   Irregular bowel habits     Irregular heart beat     palpitations, unusual chest discomfort   Dr Genna Pablo recommended    Stomach pain        Past Surgical History:   Procedure Laterality Date    WISDOM TOOTH EXTRACTION         Family History   Problem Relation Age of Onset    Diabetes Mother     Hypertension Maternal Grandmother     Hypertension Maternal Uncle     Diabetes Maternal Uncle     Cancer Maternal Grandfather     Cancer Maternal Aunt      I have reviewed and agree with the history as documented  E-Cigarette/Vaping    E-Cigarette Use Former User     Start Date 3/31/18     Quit Date 3/31/19      E-Cigarette/Vaping Substances     Social History     Tobacco Use    Smoking status: Current Every Day Smoker     Packs/day: 0 50     Years: 15 00     Pack years: 7 50    Smokeless tobacco: Never Used   Vaping Use    Vaping Use: Former    Start date: 3/31/2018   Elina Zheng Quit date: 3/31/2019   Substance Use Topics    Alcohol use: Yes     Comment: social    Drug use: Never       Review of Systems   Constitutional: Negative for chills and fever  HENT: Negative for ear pain and sore throat  Eyes: Negative for pain and visual disturbance  Respiratory: Negative for cough and shortness of breath  Cardiovascular: Negative for chest pain and palpitations  Gastrointestinal: Positive for abdominal pain  Negative for nausea and vomiting  Genitourinary: Negative for dysuria and hematuria  Musculoskeletal: Negative for arthralgias and back pain  Skin: Negative for color change and rash  Neurological: Negative for seizures and syncope  All other systems reviewed and are negative  Physical Exam  Physical Exam  Vitals and nursing note reviewed  Constitutional:       General: He is not in acute distress  HENT:      Head: Atraumatic  Right Ear: External ear normal       Left Ear: External ear normal       Nose: Nose normal       Mouth/Throat:      Mouth: Mucous membranes are moist       Pharynx: Oropharynx is clear  Eyes:      General: No scleral icterus       Conjunctiva/sclera: Conjunctivae normal  Cardiovascular:      Rate and Rhythm: Normal rate and regular rhythm  Pulses: Normal pulses  Pulmonary:      Effort: Pulmonary effort is normal  No respiratory distress  Breath sounds: Normal breath sounds  Abdominal:      General: Abdomen is flat  Bowel sounds are normal  There is no distension  Palpations: Abdomen is soft  Tenderness: There is abdominal tenderness  There is no guarding or rebound  Musculoskeletal:         General: No deformity  Normal range of motion  Skin:     Capillary Refill: Capillary refill takes less than 2 seconds  Findings: No rash  Neurological:      General: No focal deficit present  Mental Status: He is alert and oriented to person, place, and time           Vital Signs  ED Triage Vitals   Temperature Pulse Respirations Blood Pressure SpO2   04/18/22 1912 04/18/22 1912 04/18/22 1912 04/18/22 1912 04/18/22 1912   (!) 97 1 °F (36 2 °C) 80 18 148/91 97 %      Temp src Heart Rate Source Patient Position - Orthostatic VS BP Location FiO2 (%)   -- 04/18/22 2200 04/18/22 2200 04/18/22 2200 --    Monitor Lying Right arm       Pain Score       04/18/22 1912       8           Vitals:    04/18/22 1912 04/18/22 2200   BP: 148/91 140/89   Pulse: 80 67   Patient Position - Orthostatic VS:  Lying         Visual Acuity      ED Medications  Medications   ketorolac (TORADOL) injection 15 mg (15 mg Intravenous Given 4/18/22 2250)   iohexol (OMNIPAQUE) 350 MG/ML injection (SINGLE-DOSE) 100 mL (100 mL Intravenous Given 4/18/22 2328)       Diagnostic Studies  Results Reviewed     Procedure Component Value Units Date/Time    HS Troponin 0hr (reflex protocol) [[id number]]  (Normal) Collected: 04/18/22 2159    Lab Status: Final result Specimen: Blood from Arm, Right Updated: 04/18/22 2233     hs TnI 0hr 2 ng/L     Comprehensive metabolic panel [[id number]]  (Abnormal) Collected: 04/18/22 2159    Lab Status: Final result Specimen: Blood from Arm, Right Updated: 04/18/22 2227     Sodium 140 mmol/L      Potassium 4 2 mmol/L      Chloride 102 mmol/L      CO2 29 mmol/L      ANION GAP 9 mmol/L      BUN 11 mg/dL      Creatinine 1 12 mg/dL      Glucose 76 mg/dL      Calcium 9 2 mg/dL      AST 20 U/L      ALT 49 U/L      Alkaline Phosphatase 92 U/L      Total Protein 8 4 g/dL      Albumin 4 2 g/dL      Total Bilirubin 0 34 mg/dL      eGFR 85 ml/min/1 73sq m     Narrative:      National Kidney Disease Foundation guidelines for Chronic Kidney Disease (CKD):     Stage 1 with normal or high GFR (GFR > 90 mL/min/1 73 square meters)    Stage 2 Mild CKD (GFR = 60-89 mL/min/1 73 square meters)    Stage 3A Moderate CKD (GFR = 45-59 mL/min/1 73 square meters)    Stage 3B Moderate CKD (GFR = 30-44 mL/min/1 73 square meters)    Stage 4 Severe CKD (GFR = 15-29 mL/min/1 73 square meters)    Stage 5 End Stage CKD (GFR <15 mL/min/1 73 square meters)  Note: GFR calculation is accurate only with a steady state creatinine    Lipase [[id number]]  (Normal) Collected: 04/18/22 2159    Lab Status: Final result Specimen: Blood from Arm, Right Updated: 04/18/22 2227     Lipase 94 u/L     Protime-INR [[id number]]  (Normal) Collected: 04/18/22 2159    Lab Status: Final result Specimen: Blood from Arm, Right Updated: 04/18/22 2221     Protime 13 2 seconds      INR 1 02    APTT [[id number]]  (Normal) Collected: 04/18/22 2159    Lab Status: Final result Specimen: Blood from Arm, Right Updated: 04/18/22 2221     PTT 28 seconds     CBC and differential [[id number]]  (Abnormal) Collected: 04/18/22 2159    Lab Status: Final result Specimen: Blood from Arm, Right Updated: 04/18/22 2209     WBC 8 41 Thousand/uL      RBC 6 19 Million/uL      Hemoglobin 17 0 g/dL      Hematocrit 54 1 %      MCV 87 fL      MCH 27 5 pg      MCHC 31 4 g/dL      RDW 13 1 %      MPV 10 5 fL      Platelets 087 Thousands/uL      nRBC 0 /100 WBCs      Neutrophils Relative 33 %      Immat GRANS % 0 %      Lymphocytes Relative 56 %      Monocytes Relative 8 %      Eosinophils Relative 2 %      Basophils Relative 1 %      Neutrophils Absolute 2 81 Thousands/µL      Immature Grans Absolute 0 02 Thousand/uL      Lymphocytes Absolute 4 65 Thousands/µL      Monocytes Absolute 0 67 Thousand/µL      Eosinophils Absolute 0 18 Thousand/µL      Basophils Absolute 0 08 Thousands/µL                  CT abdomen pelvis with contrast   Final Result by Kris Butler MD (04/19 0008)      Colonic diverticulosis without acute diverticulitis  Workstation performed: [id number]                    Procedures  Procedures         ED Course                                             MDM  Number of Diagnoses or Management Options  Abdominal pain  Diagnosis management comments: Pulse ox 99% on room air indicating adequate oxygenation  CT and lab work discussed with patient at bedside  Amount and/or Complexity of Data Reviewed  Clinical lab tests: ordered and reviewed  Tests in the radiology section of CPT®: ordered and reviewed  Decide to obtain previous medical records or to obtain history from someone other than the patient: yes  Review and summarize past medical records: yes    Patient Progress  Patient progress: stable      Disposition  Final diagnoses:   Abdominal pain     Time reflects when diagnosis was documented in both MDM as applicable and the Disposition within this note     Time User Action Codes Description Comment    4/19/2022 12:16 AM Stefani Mina Add [R10 9] Abdominal pain       ED Disposition     ED Disposition Condition Date/Time Comment    Discharge Stable Tue Apr 19, 2022 12:16 AM Jean Hernandez discharge to home/self care              Follow-up Information     Follow up With Specialties Details Why Contact Info    Jose Pantoja MD Family Medicine In 1 week  [address] [facility] [address]  Desirae Ames MD Gastroenterology In 1 week  [employer]  [address] [place] [address]  [phone] Discharge Medication List as of 4/19/2022 12:22 AM      START taking these medications    Details   famotidine (PEPCID) 20 mg tablet Take 1 tablet (20 mg total) by mouth 2 (two) times a day for 10 days, Starting Tue 4/19/2022, Until Fri 4/29/2022, Normal      naproxen (NAPROSYN) 500 mg tablet Take 1 tablet (500 mg total) by mouth 2 (two) times a day with meals for 7 days, Starting Tue 4/19/2022, Until Tue 4/26/2022, Normal         CONTINUE these medications which have NOT CHANGED    Details   acetaminophen (TYLENOL) 325 mg tablet Take 650 mg by mouth every 6 (six) hours as needed for mild pain, Historical Med      Ascorbic Acid (vitamin C) 1000 MG tablet Take 1,000 mg by mouth daily, Historical Med      cholecalciferol (VITAMIN D3) 1,000 units tablet Take 1,000 Units by mouth daily, Historical Med      Multiple Vitamins-Minerals (Mens 50+ Advanced) CAPS Take by mouth, Historical Med      omeprazole (PriLOSEC) 40 MG capsule Take 1 capsule (40 mg total) by mouth 2 (two) times a day for 14 days, Starting Wed 1/26/2022, Until Thu 3/31/2022, Normal      Turmeric 400 MG CAPS Take by mouth, Historical Med             No discharge procedures on file      PDMP Review     None          ED Provider  Electronically Signed by           Cooper Mccord DO  04/20/22 1944

## 2022-04-21 ENCOUNTER — CLINICAL SUPPORT (OUTPATIENT)
Dept: CARDIOLOGY CLINIC | Facility: CLINIC | Age: 37
End: 2022-04-21
Payer: COMMERCIAL

## 2022-04-21 DIAGNOSIS — R00.2 PALPITATIONS: ICD-10-CM

## 2022-04-21 PROCEDURE — 93248 EXT ECG>7D<15D REV&INTERPJ: CPT | Performed by: INTERNAL MEDICINE

## 2022-04-22 ENCOUNTER — TELEPHONE (OUTPATIENT)
Dept: SLEEP CENTER | Facility: CLINIC | Age: 37
End: 2022-04-22

## 2022-04-22 NOTE — TELEPHONE ENCOUNTER
Advised patient that sleep study shows moderate JOAQUIN   Per patient he read results in my chart and looked at the doctor reviews and scheduled himself with Dr Polo Roth

## 2022-04-28 ENCOUNTER — OFFICE VISIT (OUTPATIENT)
Dept: GASTROENTEROLOGY | Facility: CLINIC | Age: 37
End: 2022-04-28
Payer: COMMERCIAL

## 2022-04-28 VITALS
OXYGEN SATURATION: 97 % | HEIGHT: 68 IN | DIASTOLIC BLOOD PRESSURE: 74 MMHG | TEMPERATURE: 98 F | WEIGHT: 203 LBS | BODY MASS INDEX: 30.77 KG/M2 | HEART RATE: 77 BPM | SYSTOLIC BLOOD PRESSURE: 114 MMHG

## 2022-04-28 DIAGNOSIS — K57.90 DIVERTICULOSIS: ICD-10-CM

## 2022-04-28 DIAGNOSIS — Z86.19 HISTORY OF HELICOBACTER PYLORI INFECTION: ICD-10-CM

## 2022-04-28 DIAGNOSIS — R19.8 ALTERNATING CONSTIPATION AND DIARRHEA: ICD-10-CM

## 2022-04-28 DIAGNOSIS — R10.12 LUQ ABDOMINAL PAIN: Primary | ICD-10-CM

## 2022-04-28 PROCEDURE — 4004F PT TOBACCO SCREEN RCVD TLK: CPT | Performed by: PHYSICIAN ASSISTANT

## 2022-04-28 PROCEDURE — 3008F BODY MASS INDEX DOCD: CPT | Performed by: PHYSICIAN ASSISTANT

## 2022-04-28 PROCEDURE — 99214 OFFICE O/P EST MOD 30 MIN: CPT | Performed by: PHYSICIAN ASSISTANT

## 2022-04-28 RX ORDER — FAMOTIDINE 20 MG/1
20 TABLET, FILM COATED ORAL DAILY
Qty: 90 TABLET | Refills: 2 | Status: SHIPPED | OUTPATIENT
Start: 2022-04-28 | End: 2022-06-30

## 2022-04-28 RX ORDER — ASPIRIN 81 MG/1
81 TABLET ORAL DAILY
COMMUNITY

## 2022-04-28 NOTE — PATIENT INSTRUCTIONS
Benefiber daily 2-3 tsp daily    pepcid 20 mg once daily for at least 4 weeks, then can take as needed    h pylori stool test to confirm the infection is gone

## 2022-04-28 NOTE — PROGRESS NOTES
Assessment and Plan    #1  LUQ pain: was in ED 4/18 due to a few days of LUQ pain/left rib pain  Unclear if this was musculoskeletal versus GI related  CT scan and labs in ER normal  Resolved with pepcid and naproxen  -can continue pepcid once daily for 4 weeks, then take as needed  -call with recurrence of pain  -avoid NSAIDs    #2  H pylori infection: s/p treatment about 3 months ago, off PPI for over 2 weeks  -can take pepcid at this time  -stool study ordered to confirm eradication     #3  Alternating diarrhea and constipation: had normal colonoscopy in January  Suspect functional/IBS  -start fiber supplement daily  -avoid food triggers    #4  Diverticulosis: as seen on CT scan  -fiber as above  -discussed complications such as diverticulitis and bleeding    Follow up in 2-3 months  --------------------------------------------------------------------------------------------------------------------    Chief Complaint: f/u LUQ pain, H pylori    HPI: Rosaline Betancourt is a 31 y o  male with a history of sleep apnea and hypertension who presents today for follow up for H pylori, alternating bowel habits, and ER visit  Patient was originally seen back in November for intermittent diarrhea and abdominal cramping, GERD, and bright red blood per rectum  He underwent EGD and colonoscopy in January which was notable for H pylori, some gastric erosions  Biopsies of the colon were negative  He did have some internal hemorrhoids  He subsequently underwent H pylori treatment which he completed in January  He is here for follow-up  He recently was seen in the emergency room on 04/18 secondary to left upper quadrant abdominal pain which started a few days prior  He denied having this pain previously  He reports that he was trying to treated at home with Gas-X and Tylenol with no improvement in the subsequently went to the urgent care and then was referred to the emergency room    He had labs which were normal and a CT scan which was normal aside from diverticulosis  He reports that he started Pepcid over-the-counter and naproxen and the pain gradually improved over a few days  He reports that about a week or so prior to this pain started he had stopped his omeprazole because it was giving him an uncomfortable feeling  He reports that the pain that he was having was not necessarily related to food  It was worse with certain movement  He does not recall injuring himself  He did help to lift a couch however did not have any injury at that time  He did have 1 episode of vomiting associated with the abdominal pain  Denied any significant change in bowel habits, melena, or blood in the stool  Reports that his bowels alternate between hard and loose  He has not tried a fiber supplement at this time  He is leaving today to take his family to [address] [employer]        Review of Systems:   General: negative for fatigue, fever, night sweats or unexpected weight loss  Psychological: negative for anxiety or depression  Ophthalmic: negative for blurry vision or scleral icterus  ENT: negative for headaches, oral lesions, sore throat, vocal changes or dysphagia  Hematological and Lymphatic: negative for pallor or swollen lymph nodes  Respiratory: negative for cough, shortness of breath or wheezing  Cardiovascular: negative for chest pain, edema or murmur  Gastrointestinal: as mentioned in HPI  Genito-Urinary: negative for dysuria or incontinence  Musculoskeletal: negative for joint pain, joint stiffness or joint swelling  Dermatological: negative for pruritus, rash, or jaundice    Current Medications  Current Outpatient Medications   Medication Sig Dispense Refill    Acetaminophen 160 MG TBDP Take 160 mg by mouth if needed for mild pain        Ascorbic Acid (vitamin C) 1000 MG tablet Take 1,000 mg by mouth daily Stopped taking but will resume       aspirin (ECOTRIN LOW STRENGTH) 81 mg EC tablet Take 81 mg by mouth daily      cholecalciferol (VITAMIN D3) 1,000 units tablet Take 1,000 Units by mouth daily Stopped taking but will resume       famotidine (PEPCID) 20 mg tablet Take 1 tablet (20 mg total) by mouth in the morning for 10 days 90 tablet 2    Multiple Vitamins-Minerals (Mens 50+ Advanced) CAPS Take by mouth Stopped taking but will resume       omeprazole (PriLOSEC) 40 MG capsule Take 1 capsule (40 mg total) by mouth 2 (two) times a day for 14 days (Patient taking differently: Take 40 mg by mouth daily  ) 28 capsule 0    Turmeric 400 MG CAPS Take by mouth Stopped taking but will resume       naproxen (NAPROSYN) 500 mg tablet Take 1 tablet (500 mg total) by mouth 2 (two) times a day with meals for 7 days (Patient not taking: Reported on 4/28/2022 ) 14 tablet 0     No current facility-administered medications for this visit  Past Medical History  Past Medical History:   Diagnosis Date    Bloody stool     Diverticulitis of colon     GERD (gastroesophageal reflux disease)     Headaches, cluster     Hypertension     borderline high, not on meds currently   Irregular bowel habits     Irregular heart beat     palpitations, unusual chest discomfort   Dr Jacqualine Sicard recommended    Stomach pain        Past Surgical History  Past Surgical History:   Procedure Laterality Date    COLONOSCOPY      WISDOM TOOTH EXTRACTION         Past Social History   Social History     Socioeconomic History    Marital status: /Civil Union     Spouse name: None    Number of children: None    Years of education: None    Highest education level: None   Occupational History    None   Tobacco Use    Smoking status: Current Every Day Smoker     Packs/day: 0 50     Years: 15 00     Pack years: 7 50     Types: Cigarettes    Smokeless tobacco: Never Used   Vaping Use    Vaping Use: Former    Start date: 3/31/2018   Liat Gustafson Quit date: 3/31/2019   Substance and Sexual Activity    Alcohol use: Yes     Comment: social    Drug use: Never    Sexual activity: None   Other Topics Concern    None   Social History Narrative    None     Social Determinants of Health     Financial Resource Strain: Not on file   Food Insecurity: Not on file   Transportation Needs: Not on file   Physical Activity: Not on file   Stress: Not on file   Social Connections: Not on file   Intimate Partner Violence: Not on file   Housing Stability: Not on file       The following portions of the patient's history were reviewed and updated as appropriate: allergies, current medications, past family history, past medical history, past social history, past surgical history and problem list     Vital Signs  Vitals:    04/28/22 0945   BP: 114/74   BP Location: Left arm   Patient Position: Sitting   Cuff Size: Standard   Pulse: 77   Temp: 98 °F (36 7 °C)   SpO2: 97%   Weight: 92 1 kg (203 lb)   Height: 5' 8" (1 727 m)       Physical Exam:  General appearance: alert, cooperative, no distress  HEENT: normocephalic, anicteric, no eye erythema or discharge, no oropharyngeal thrush  Neck: supple, trachea midline, no adenopathy  Lungs: CTA b/l, no rales, rhonchi, or wheezing, unlabored respirations  Heart: RRR, no murmur, rubs, or gallops  Abdomen: soft, non-tender, non-distended, normal bowel sounds, no masses or organomegaly  Rectal: deferred  Extremities: no cyanosis, clubbing, or edema  Musculoskeletal: normal gait  Skin: color and texture normal, no jaundice, no rashes or lesions  Psychiatric: alert and oriented, normal affect and behavior

## 2022-05-18 LAB — H PYLORI AG STL QL IA: NEGATIVE

## 2022-05-26 ENCOUNTER — OFFICE VISIT (OUTPATIENT)
Dept: CARDIOLOGY CLINIC | Facility: CLINIC | Age: 37
End: 2022-05-26
Payer: COMMERCIAL

## 2022-05-26 VITALS
HEIGHT: 68 IN | OXYGEN SATURATION: 98 % | BODY MASS INDEX: 30.16 KG/M2 | SYSTOLIC BLOOD PRESSURE: 110 MMHG | WEIGHT: 199 LBS | HEART RATE: 82 BPM | TEMPERATURE: 98.3 F | DIASTOLIC BLOOD PRESSURE: 70 MMHG

## 2022-05-26 DIAGNOSIS — E78.2 ELEVATED TRIGLYCERIDES WITH HIGH CHOLESTEROL: ICD-10-CM

## 2022-05-26 DIAGNOSIS — R03.0 ELEVATED BP WITHOUT DIAGNOSIS OF HYPERTENSION: ICD-10-CM

## 2022-05-26 DIAGNOSIS — G47.33 MODERATE OBSTRUCTIVE SLEEP APNEA: ICD-10-CM

## 2022-05-26 DIAGNOSIS — R06.81 WITNESSED EPISODE OF APNEA: ICD-10-CM

## 2022-05-26 DIAGNOSIS — R07.89 CHEST TIGHTNESS: Primary | ICD-10-CM

## 2022-05-26 PROCEDURE — 3008F BODY MASS INDEX DOCD: CPT | Performed by: INTERNAL MEDICINE

## 2022-05-26 PROCEDURE — 99214 OFFICE O/P EST MOD 30 MIN: CPT | Performed by: INTERNAL MEDICINE

## 2022-05-26 PROCEDURE — 4004F PT TOBACCO SCREEN RCVD TLK: CPT | Performed by: INTERNAL MEDICINE

## 2022-05-26 RX ORDER — CHLORAL HYDRATE 500 MG
1000 CAPSULE ORAL 2 TIMES DAILY
Qty: 60 CAPSULE | Refills: 3
Start: 2022-05-26

## 2022-05-26 NOTE — PATIENT INSTRUCTIONS
Heart Healthy Diet   WHAT YOU NEED TO KNOW:   A heart healthy diet is an eating plan low in unhealthy fats and sodium (salt)  The plan is high in healthy fats and fiber  A heart healthy diet helps improve your cholesterol levels and lowers your risk for heart disease and stroke  A dietitian will teach you how to read and understand food labels  DISCHARGE INSTRUCTIONS:   Heart healthy diet guidelines to follow:   Choose foods that contain healthy fats  Unsaturated fats  include monounsaturated and polyunsaturated fats  Unsaturated fat is found in foods such as soybean, canola, olive, corn, and safflower oils  It is also found in soft tub margarine that is made with liquid vegetable oil  Omega-3 fat  is found in certain fish, such as salmon, tuna, and trout, and in walnuts and flaxseed  Eat fish high in omega-3 fats at least 2 times a week  Get 20 to 30 grams of fiber each day  Fruits, vegetables, whole-grain foods, and legumes (cooked beans) are good sources of fiber  Limit or do not have unhealthy fats  Cholesterol  is found in animal foods, such as eggs and lobster, and in dairy products made from whole milk  Limit cholesterol to less than 200 mg each day  Saturated fat  is found in meats, such as isaac and hamburger  It is also found in chicken or turkey skin, whole milk, and butter  Limit saturated fat to less than 7% of your total daily calories  Trans fat  is found in packaged foods, such as potato chips and cookies  It is also in hard margarine, some fried foods, and shortening  Do not eat foods that contain trans fats  Limit sodium as directed  You may be told to limit sodium to 2,000 to 2,300 mg each day  Choose low-sodium or no-salt-added foods  Add little or no salt to food you prepare  Use herbs and spices in place of salt         Include the following in your heart healthy plan:  Ask your dietitian or healthcare provider how many servings to have from each of the following food groups:  Grains:      Whole-wheat breads, cereals, and pastas, and brown rice    Low-fat, low-sodium crackers and chips    Vegetables:      Broccoli, green beans, green peas, and spinach    Collards, kale, and lima beans    Carrots, sweet potatoes, tomatoes, and peppers    Canned vegetables with no salt added    Fruits:      Bananas, peaches, pears, and pineapple    Grapes, raisins, and dates    Oranges, tangerines, grapefruit, orange juice, and grapefruit juice    Apricots, mangoes, melons, and papaya    Raspberries and strawberries    Canned fruit with no added sugar    Low-fat dairy:      Nonfat (skim) milk, 1% milk, and low-fat almond, cashew, or soy milks fortified with calcium    Low-fat cheese, regular or frozen yogurt, and cottage cheese    Meats and proteins:      Lean cuts of beef and pork (loin, leg, round), skinless chicken and turkey    Legumes, soy products, egg whites, or nuts    Limit or do not include the following in your heart healthy plan:   Unhealthy fats and oils:      Whole or 2% milk, cream cheese, sour cream, or cheese    High-fat cuts of beef (T-bone steaks, ribs), chicken or turkey with skin, and organ meats such as liver    Butter, stick margarine, shortening, and cooking oils such as coconut or palm oil    Foods and liquids high in sodium:      Packaged foods, such as frozen dinners, cookies, macaroni and cheese, and cereals with more than 300 mg of sodium per serving    Vegetables with added sodium, such as instant potatoes, vegetables with added sauces, or regular canned vegetables    Cured or smoked meats, such as hot dogs, isaac, and sausage    High-sodium ketchup, barbecue sauce, salad dressing, pickles, olives, soy sauce, or miso    Foods and liquids high in sugar:      Candy, cake, cookies, pies, or doughnuts    Soft drinks (soda), sports drinks, or sweetened tea    Canned or dry mixes for cakes, soups, sauces, or gravies    Other healthy heart guidelines:   Do not smoke   Nicotine and other chemicals in cigarettes and cigars can cause lung and heart damage  Ask your healthcare provider for information if you currently smoke and need help to quit  E-cigarettes or smokeless tobacco still contain nicotine  Talk to your healthcare provider before you use these products  Limit or do not drink alcohol as directed  Alcohol can damage your heart and raise your blood pressure  Your healthcare provider may give you specific daily and weekly limits  The general recommended limit is 1 drink a day for women 21 or older and for men 72 or older  Do not have more than 3 drinks in a day or 7 in a week  The recommended limit is 2 drinks a day for men 24to 59years of age  Do not have more than 4 drinks in a day or 14 in a week  A drink of alcohol is 12 ounces of beer, 5 ounces of wine, or 1½ ounces of liquor  Exercise regularly  Exercise can help you maintain a healthy weight and improve your blood pressure and cholesterol levels  Regular exercise can also decrease your risk for heart problems  Ask your healthcare provider about the best exercise plan for you  Do not start an exercise program without asking your healthcare provider  Follow up with your doctor or cardiologist as directed:  Write down your questions so you remember to ask them during your visits  © Copyright [employer] 2022 Information is for End User's use only and may not be sold, redistributed or otherwise used for commercial purposes  All illustrations and images included in CareNotes® are the copyrighted property of [employer] M [employer] Inc  or [facility]   The above information is an  only  It is not intended as medical advice for individual conditions or treatments  Talk to your doctor, nurse or pharmacist before following any medical regimen to see if it is safe and effective for you        Low-Sodium Diet   WHAT YOU NEED TO KNOW:   A low-sodium diet limits foods that are high in sodium (salt)  You will need to follow a low-sodium diet if you have high blood pressure, kidney disease, or heart failure  You may also need to follow this diet if you have a condition that is causing your body to retain (hold) extra fluid  You may need to limit the amount of sodium you eat in a day to 1,500 to 2,000 mg  Ask your healthcare provider how much sodium you can have each day  DISCHARGE INSTRUCTIONS:   How to use food labels to choose foods that are low in sodium:  Read food labels to find the amount of sodium they contain  The amount of sodium is listed in milligrams (mg)  The % Daily Value (DV) column tells you how much of your daily needs are met by 1 serving of the food for each nutrient listed  Choose foods that have less than 5% of the DV of sodium  These foods are considered low in sodium  Foods that have 20% or more of the DV of sodium are considered high in sodium  Some food labels may also list any of the following terms that tell you about the sodium content in the food:  Sodium-free:  Less than 5 mg in each serving    Very low sodium:  35 mg of sodium or less in each serving    Low sodium:  140 mg of sodium or less in each serving    Reduced sodium: At least 25% less sodium in each serving than the regular type    Light in sodium:  50% less sodium in each serving    Unsalted or no added salt:  No extra salt is added during processing (the food may still contain sodium)       Foods to avoid:  Salty foods are high in sodium   You should avoid the following:  Processed foods:      Mixes for cornbread, biscuits, cake, and pudding     Instant foods, such as potatoes, cereals, noodles, and rice     Packaged foods, such as bread stuffing, rice and pasta mixes, snack dip mixes, and macaroni and cheese     Canned foods, such as canned vegetables, soups, broths, sauces, and vegetable or tomato juice    Snack foods, such as salted chips, popcorn, pretzels, pork rinds, salted crackers, and salted nuts    Frozen foods, such as dinners, entrees, vegetables with sauces, and breaded meats    Sauerkraut, pickled vegetables, and other foods prepared in brine    Meats and cheeses:      Smoked or cured meat, such as corned beef, isaac, ham, hot dogs, and sausage    Canned meats or spreads, such as potted meats, sardines, anchovies, and imitation seafood    Deli or lunch meats, such as bologna, ham, turkey, and roast beef    Processed cheese, such as American cheese and cheese spreads    Condiments, sauces, and seasonings:      Salt (¼ teaspoon of salt contains 575 mg of sodium)    Seasonings made with salt, such as garlic salt, celery salt, onion salt, and seasoned salt    Regular soy sauce, barbecue sauce, teriyaki sauce, steak sauce, Worcestershire sauce, and most flavored vinegars    Canned gravy and mixes     Regular condiments, such as mustard, ketchup, and salad dressings    Pickles and olives    Meat tenderizers and monosodium glutamate (MSG)    Foods to include:  Read the food label to find the amount of sodium in each serving  Bread and cereal:  Try to choose breads with less than 80 mg of sodium per serving  Bread, roll, vanessa, tortilla, or unsalted crackers  Ready-to-eat cereals with less than 5% DV of sodium (examples include shredded wheat and puffed rice)    Pasta    Vegetables and fruits:      Unsalted fresh, frozen, or canned vegetables    Fresh, frozen, or canned fruits    Fruit juice    Dairy:  One serving has about 150 mg of sodium  Milk, all types    Yogurt    Hard cheese, such as cheddar, Swiss, [employer], or [employer] and other protein foods:  Some raw meats may have added sodium  Plain meats, fish, and poultry     Egg    Other foods:      Homemade pudding    Unsalted nuts, popcorn, or pretzels    Unsalted butter or margarine    Ways to decrease sodium:   Add spices and herbs to foods instead of salt during cooking  Use salt-free seasonings to add flavor to foods  Examples include onion powder, garlic powder, basil, huff powder, paprika, and parsley  Try lemon or lime juice or vinegar to give foods a tart flavor  Use hot peppers, pepper, or cayenne pepper to add a spicy flavor  Do not keep a salt shaker at your kitchen table  This may help keep you from adding salt to food at the table  A teaspoon of salt has 2,300 mg of sodium  It may take time to get used to enjoying the natural flavor of food instead of adding salt  Talk to your healthcare provider before you use salt substitutes  Some salt substitutes have a high amount of potassium and need to be avoided if you have kidney disease  Choose low-sodium foods at restaurants  Meals from restaurants are often high in sodium  Some restaurants have nutrition information on the menu that tells you the amount of sodium in their foods  If possible, ask for your food to be prepared with less, or no salt  Shop for unsalted or low-sodium foods and snacks at the grocery store  Examples include unsalted or low-sodium broths, soups, and canned vegetables  Choose fresh or frozen vegetables instead  Choose unsalted nuts or seeds or fresh fruits or vegetables as snacks  Read food labels and choose salt-free, very low-sodium, or low-sodium foods  © Copyright [facility] 2022 Information is for End User's use only and may not be sold, redistributed or otherwise used for commercial purposes  All illustrations and images included in CareNotes® are the copyrighted property of [employer] Inc  or [employer]  The above information is an  only  It is not intended as medical advice for individual conditions or treatments  Talk to your doctor, nurse or pharmacist before following any medical regimen to see if it is safe and effective for you

## 2022-05-26 NOTE — PROGRESS NOTES
[facility]  [address]  [address]   [place], [address]  Cardiology Consultation    Katiuska Haile  [id number]  1985      Consult for:  Elevated blood pressure/chest tightness  Appreciate consult by: Opal Sheffield MD    1  Chest tightness  CPAP Study   2  Witnessed episode of apnea  CPAP Study   3  Elevated BP without diagnosis of hypertension  CPAP Study   4  Moderate obstructive sleep apnea  CPAP Study      Discussion/Summary:   Elevated blood pressure/chest tightness- sleep study was positive for moderate obstructive sleep apnea  His oxygen dropped below 80%  His wife has with this severe apnea episodes  I reviewed his heart monitor which was negative for major ventricular arrhythmias  He will continue with a low-sodium diet  Plan for CPAP fitting  He will also see sleep medicine in ear nose throat  He reports having a delay for follow-up    Low HDL/triglycerides- he will take Omega 3 supplementation  He is making dietary modifications  Smoking history- discussed smoking cessation      HPI:   44-year-old gentleman with strong family history for hypertension presents with periodic chest tightness and elevated blood pressure  His wife states he has apneic episodes at night  She is she at times is worried he is not breathing  He reports having some daytime fatigue  He has never been formally diagnosed with sleep disordered breathing  He is also noted elevated blood pressures at times  He is concerned as he has a strong family history  He periodically feels chest tightness  He is trying to modify his diet  05/26/2022:  Still with episodes of apnea  Still with severe fatigue in the morning  I reviewed with him his abnormal sleep study  His wife has not witnessed his apneic episodes      Social Hx  Lives in [facility]  Diet- hard to cut back in house  "[employer] technician  Projects  Smoking- 1/2 pack day - difficult  Alcohol- seldom  Sleep - +snoring loud, stop breathing, wife noted apnea     Family Hx  Mom, aunt-htn      Past Medical History:   Diagnosis Date    Bloody stool     Diverticulitis of colon     GERD (gastroesophageal reflux disease)     Headaches, cluster     Hypertension     borderline high, not on meds currently   Irregular bowel habits     Irregular heart beat     palpitations, unusual chest discomfort   Dr Neil Loving recommended    Stomach pain      Social History     Socioeconomic History    Marital status: /Civil Union     Spouse name: Not on file    Number of children: Not on file    Years of education: Not on file    Highest education level: Not on file   Occupational History    Not on file   Tobacco Use    Smoking status: Current Every Day Smoker     Packs/day: 0 50     Years: 15 00     Pack years: 7 50     Types: Cigarettes    Smokeless tobacco: Never Used   Vaping Use    Vaping Use: Former    Start date: 3/31/2018    Quit date: 3/31/2019   Substance and Sexual Activity    Alcohol use: Yes     Comment: social    Drug use: Never    Sexual activity: Not on file   Other Topics Concern    Not on file   Social History Narrative    Not on file     Social Determinants of Health     Financial Resource Strain: Not on file   Food Insecurity: Not on file   Transportation Needs: Not on file   Physical Activity: Not on file   Stress: Not on file   Social Connections: Not on file   Intimate Partner Violence: Not on file   Housing Stability: Not on file      Family History   Problem Relation Age of Onset    Diabetes Mother     Hypertension Maternal Grandmother     Hypertension Maternal Uncle     Diabetes Maternal Uncle     Cancer Maternal Grandfather     Cancer Maternal Aunt      Past Surgical History:   Procedure Laterality Date    COLONOSCOPY      WISDOM TOOTH EXTRACTION         Current Outpatient Medications:     famotidine (PEPCID) 20 mg tablet, Take 1 tablet (20 mg total) by mouth in the morning for 10 days, Disp: 90 tablet, Rfl: 2    Acetaminophen 160 MG TBDP, Take 160 mg by mouth if needed for mild pain   (Patient not taking: Reported on 5/26/2022), Disp: , Rfl:     Ascorbic Acid (vitamin C) 1000 MG tablet, Take 1,000 mg by mouth daily Stopped taking but will resume  (Patient not taking: Reported on 5/26/2022), Disp: , Rfl:     aspirin (ECOTRIN LOW STRENGTH) 81 mg EC tablet, Take 81 mg by mouth daily (Patient not taking: Reported on 5/26/2022), Disp: , Rfl:     cholecalciferol (VITAMIN D3) 1,000 units tablet, Take 1,000 Units by mouth daily Stopped taking but will resume  (Patient not taking: Reported on 5/26/2022), Disp: , Rfl:     Multiple Vitamins-Minerals (Mens 50+ Advanced) CAPS, Take by mouth Stopped taking but will resume  (Patient not taking: Reported on 5/26/2022), Disp: , Rfl:     naproxen (NAPROSYN) 500 mg tablet, Take 1 tablet (500 mg total) by mouth 2 (two) times a day with meals for 7 days (Patient not taking: Reported on 4/28/2022 ), Disp: 14 tablet, Rfl: 0    omeprazole (PriLOSEC) 40 MG capsule, Take 1 capsule (40 mg total) by mouth 2 (two) times a day for 14 days (Patient taking differently: Take 40 mg by mouth daily  ), Disp: 28 capsule, Rfl: 0    Turmeric 400 MG CAPS, Take by mouth Stopped taking but will resume  (Patient not taking: Reported on 5/26/2022), Disp: , Rfl:   Allergies   Allergen Reactions    Bee Venom Anaphylaxis     Reaction Date: [id number];      Vitals:    05/26/22 0903 05/26/22 0908   BP: 104/66 110/70   BP Location: Right arm Left arm   Patient Position: Sitting Sitting   Cuff Size: Standard Standard   Pulse: 82    Temp: 98 3 °F (36 8 °C)    SpO2: 98%    Weight: 90 3 kg (199 lb)    Height: 5' 8" (1 727 m)        Review of Systems:   Review of Systems   Constitutional: Negative for fatigue  HENT: Negative  Eyes: Negative  Respiratory: Negative for shortness of breath  Cardiovascular: Positive for chest pain and palpitations  Gastrointestinal: Negative  Endocrine: Negative  Genitourinary: Negative  Musculoskeletal: Negative  Skin: Negative  Allergic/Immunologic: Negative  Neurological: Positive for dizziness  Hematological: Negative  Psychiatric/Behavioral: Negative  Vitals:    05/26/22 0903 05/26/22 0908   BP: 104/66 110/70   BP Location: Right arm Left arm   Patient Position: Sitting Sitting   Cuff Size: Standard Standard   Pulse: 82    Temp: 98 3 °F (36 8 °C)    SpO2: 98%    Weight: 90 3 kg (199 lb)    Height: 5' 8" (1 727 m)      Physical Examination:   Physical Exam  Constitutional:       General: He is not in acute distress  Appearance: He is well-developed  He is not diaphoretic  HENT:      Head: Normocephalic and atraumatic  Right Ear: External ear normal       Left Ear: External ear normal    Eyes:      General: No scleral icterus  Right eye: No discharge  Left eye: No discharge  Conjunctiva/sclera: Conjunctivae normal       Pupils: Pupils are equal, round, and reactive to light  Neck:      Thyroid: No thyromegaly  Vascular: No JVD  Trachea: No tracheal deviation  Cardiovascular:      Rate and Rhythm: Normal rate and regular rhythm  Heart sounds: No murmur heard  No friction rub  No gallop  Pulmonary:      Effort: Pulmonary effort is normal  No respiratory distress  Breath sounds: Normal breath sounds  No stridor  No wheezing or rales  Chest:      Chest wall: No tenderness  Abdominal:      General: Bowel sounds are normal  There is no distension  Palpations: Abdomen is soft  There is no mass  Tenderness: There is no abdominal tenderness  There is no guarding or rebound  Musculoskeletal:         General: No tenderness or deformity  Normal range of motion  Cervical back: Normal range of motion and neck supple  Skin:     General: Skin is warm and dry  Coloration: Skin is not pale  Findings: No erythema or rash     Neurological:      Mental Status: He is alert and oriented to person, place, and time  Cranial Nerves: No cranial nerve deficit  Motor: No abnormal muscle tone  Coordination: Coordination normal       Deep Tendon Reflexes: Reflexes are normal and symmetric  Reflexes normal    Psychiatric:         Behavior: Behavior normal          Thought Content: Thought content normal          Judgment: Judgment normal          Labs:     Lab Results   Component Value Date    WBC 8 41 04/18/2022    HGB 17 0 04/18/2022    HCT 54 1 (H) 04/18/2022    MCV 87 04/18/2022    RDW 13 1 04/18/2022     04/18/2022     BMP:  Lab Results   Component Value Date    SODIUM 140 04/18/2022    K 4 2 04/18/2022     04/18/2022    CO2 29 04/18/2022    BUN 11 04/18/2022    CREATININE 1 12 04/18/2022    GLUC 76 04/18/2022    CALCIUM 9 2 04/18/2022    EGFR 85 04/18/2022     LFT:  Lab Results   Component Value Date    AST 20 04/18/2022    ALT 49 04/18/2022    ALKPHOS 92 04/18/2022    TP 8 4 (H) 04/18/2022    ALB 4 2 04/18/2022      No results found for: [facility]  Lab Results   Component Value Date    HGBA1C 5 8 (H) 09/30/2021     Lipid Profile:   Lab Results   Component Value Date    CHOLESTEROL 197 09/30/2021    HDL 36 (L) 09/30/2021    LDLCALC 133 (H) 09/30/2021    TRIG 154 (H) 09/30/2021     Lab Results   Component Value Date    CHOLESTEROL 197 09/30/2021     No results found for: CKTOTAL, CKMB, CKMBINDEX, TROPONINI  No results found for: NTBNP   Recent Results (from the past 672 hour(s))   H  pylori antigen, stool    Collection Time: 05/15/22 11:00 PM   Result Value Ref Range    H pylori Ag, Stl Negative Negative       Imaging & Testing   I have personally reviewed pertinent reports  Cardiac Testing     EKG: Personally reviewed      Normal sinus rhythm no acute ST T wave changes      Doris Martinez MD [facility]  [phone]  Please call with any questions or suggestions    Counseling :  A description of the counseling:   Goals and Barriers:  Patient's ability to self care:  Medication side effect reviewed with patient in detail and all their questions answered  "Portions of the record may have been created with voice recognition software  Occasional wrong word or "sound a like" substitutions may have occurred due to the inherent limitations of voice recognition software  Read the chart carefully and recognize, using context, where substitutions have occurred   Please call if you have any questions  "

## 2022-05-27 ENCOUNTER — TELEPHONE (OUTPATIENT)
Dept: CARDIOLOGY CLINIC | Facility: CLINIC | Age: 37
End: 2022-05-27

## 2022-05-27 NOTE — TELEPHONE ENCOUNTER
Left message for patient to call me back to let him know authorization needs to be done for cpap study and then Alfredito Dorsey of sleep lab needs to be notified Juana Mike text and they will put him on a cancellation list/

## 2022-05-31 ENCOUNTER — TELEPHONE (OUTPATIENT)
Dept: CARDIOLOGY CLINIC | Facility: CLINIC | Age: 37
End: 2022-05-31

## 2022-05-31 NOTE — TELEPHONE ENCOUNTER
Pt changed appt  to Friday with [facility]  Called [facility] site location changed;order number remains the same

## 2022-05-31 NOTE — TELEPHONE ENCOUNTER
CPT 50124 CPAP Study Approved  Valid from 5/31/2022-7/29/2022  Left a v/m for patient to return call to the clinical staff

## 2022-05-31 NOTE — TELEPHONE ENCOUNTER
I was instructed to notify you that the authorization for the cpap has been obtained by my office for this patient:  Edison lou from 5/31- 7/29/22 for the cpap study  Sunday Roland is out of the office this week and I am just following up on this matter for her

## 2022-06-03 ENCOUNTER — HOSPITAL ENCOUNTER (OUTPATIENT)
Dept: SLEEP CENTER | Facility: CLINIC | Age: 37
Discharge: HOME | End: 2022-06-03
Payer: COMMERCIAL

## 2022-06-03 DIAGNOSIS — R06.81 WITNESSED EPISODE OF APNEA: ICD-10-CM

## 2022-06-03 DIAGNOSIS — G47.33 MODERATE OBSTRUCTIVE SLEEP APNEA: ICD-10-CM

## 2022-06-03 DIAGNOSIS — R07.89 CHEST TIGHTNESS: ICD-10-CM

## 2022-06-03 DIAGNOSIS — R03.0 ELEVATED BP WITHOUT DIAGNOSIS OF HYPERTENSION: ICD-10-CM

## 2022-06-03 PROCEDURE — 95811 POLYSOM 6/>YRS CPAP 4/> PARM: CPT | Performed by: INTERNAL MEDICINE

## 2022-06-03 PROCEDURE — 95811 POLYSOM 6/>YRS CPAP 4/> PARM: CPT

## 2022-06-04 NOTE — PROGRESS NOTES
Sleep Study Documentation    Pre-Sleep Study       Sleep testing procedure explained to patient:YES    Patient napped prior to study:NO    Caffeine:Dayshift worker after 12PM   Caffeine use:YES- coffee  6 to 18 ounces  Soda  12 ounces  Alcohol:Dayshift workers after 5PM: Alcohol use:NO    Typical day for patient:YES       Study Documentation    Sleep Study Indications:  JOAQUIN-diagnosed home study at [facility]  Sleep Study: Treatment   Optimal PAP pressure:  8 cm H2O  Leak:Small  Snore:Eliminated  REM Obtained:yes  Supplemental O2: no    Minimum SaO2  91 %  Baseline SaO2  95 3 %  PAP mask tried (list all) Medium ResMed N20 AirFit Nasal Mask with no added humidity  PAP mask choice (final) Same  PAP mask type:nasal  PAP pressure at which snoring was eliminated  8 cm H2O  Minimum SaO2 at final PAP pressure  91 %  Mode of Therapy:CPAP  ETCO2:No  CPAP changed to BiPAP:No    Mode of Therapy:CPAP    EKG abnormalities: no     EEG abnormalities: no    Sleep Study Recorded < 2 hours: N/A    Sleep Study Recorded > 2 hours but incomplete study: N/A    Sleep Study Recorded 6 hours but no sleep obtained: NO          Post-Sleep Study    Medication used at bedtime or during sleep study:NO    Patient reports time it took to fall asleep:greater than 60 minutes    Patient reports waking up during study:3 or more times  Patient reports returning to sleep without difficulty  Patient reports sleeping 2 to 4 hours without dreaming  Patient reports sleep during study:worse than usual    Patient rated sleepiness: Somewhat sleepy or tired    PAP treatment:yes: Post PAP treatment patient reports feeling unsure if a change is noted and  would wear PAP mask at home

## 2022-06-06 ENCOUNTER — TELEPHONE (OUTPATIENT)
Dept: PULMONOLOGY | Facility: CLINIC | Age: 37
End: 2022-06-06

## 2022-06-06 DIAGNOSIS — G47.33 OSA (OBSTRUCTIVE SLEEP APNEA): Primary | ICD-10-CM

## 2022-06-06 NOTE — TELEPHONE ENCOUNTER
Called patient and LM to schedule him for July 11 at 3:40 with Dr Ric Horowitz  Once appointment is schedule will need to cancel 09/2022 appointment with Dr Sidney Khalil

## 2022-06-06 NOTE — PROGRESS NOTES
Patient with obstructive sleep apnea, recently underwent CPAP titration study  Need CPAP 8 cm of water using nasal mask  The results of the test explained to patient at length  Home CPAP therapy ordered  advised to follow up in the pulmonary office after 6 weeks  All questions answered

## 2022-06-06 NOTE — TELEPHONE ENCOUNTER
Cardio consult, sleep studies, demo, insurance card, CPAP order all faxed to [address]  Called and LM for patient to call the office and scheduled sleep consult with Dr Stu Tapia in October

## 2022-06-30 ENCOUNTER — CONSULT (OUTPATIENT)
Dept: PULMONOLOGY | Facility: CLINIC | Age: 37
End: 2022-06-30
Payer: COMMERCIAL

## 2022-06-30 VITALS
DIASTOLIC BLOOD PRESSURE: 72 MMHG | HEART RATE: 80 BPM | HEIGHT: 68 IN | OXYGEN SATURATION: 97 % | BODY MASS INDEX: 30.01 KG/M2 | TEMPERATURE: 96.4 F | SYSTOLIC BLOOD PRESSURE: 128 MMHG | WEIGHT: 198 LBS

## 2022-06-30 DIAGNOSIS — G47.33 OSA (OBSTRUCTIVE SLEEP APNEA): Primary | ICD-10-CM

## 2022-06-30 DIAGNOSIS — G47.20 ABNORMAL CIRCADIAN RHYTHM: ICD-10-CM

## 2022-06-30 DIAGNOSIS — E66.9 OBESITY (BMI 30-39.9): ICD-10-CM

## 2022-06-30 DIAGNOSIS — I10 PRIMARY HYPERTENSION: ICD-10-CM

## 2022-06-30 DIAGNOSIS — F17.200 TOBACCO DEPENDENCE: ICD-10-CM

## 2022-06-30 PROCEDURE — 3008F BODY MASS INDEX DOCD: CPT | Performed by: INTERNAL MEDICINE

## 2022-06-30 PROCEDURE — 99204 OFFICE O/P NEW MOD 45 MIN: CPT | Performed by: INTERNAL MEDICINE

## 2022-06-30 RX ORDER — NICOTINE 21 MG/24HR
1 PATCH, TRANSDERMAL 24 HOURS TRANSDERMAL EVERY 24 HOURS
Qty: 28 PATCH | Refills: 0 | Status: SHIPPED | OUTPATIENT
Start: 2022-06-30

## 2022-06-30 NOTE — ASSESSMENT & PLAN NOTE
He has been a smoker and currently smokes up to 1 pack a day  I counseled him to quit smoking completely  He seem is somewhat motivated to  quit  I have ordered nicotine patch at his request  I also counseled his wife to quit smoking

## 2022-06-30 NOTE — ASSESSMENT & PLAN NOTE
He is mildly obese and has been advised to lose weight  He understands that weight reduction can improve sleep apnea

## 2022-06-30 NOTE — PROGRESS NOTES
MrAssessment/Plan:    JOAQUIN (obstructive sleep apnea)  Mr Jean Shah has history of snoring interrupted sleep and witnessed apneas  He occasionally feels sleepy and tired during the day  He denied any waking up episodes during sleep feeling like choking or gasping for breath  He has been recently diagnosed with hypertension and his blood pressure keeps on fluctuating  He is not on any regular treatment at this time  He goes to sleep around 11 pm and wakes up around 7 a m  he has no significant problem maintaining sleep  He occasionally has difficulty going to sleep  He has occasional morning headache  He denied any shortness of breath or cough or phlegm or wheeze or chest pain  No swelling of feet  No previous history of tonsillectomy or adenoidectomy  He had an overnight home sleep study on 04/12/2022 which showed moderate obstructive sleep apnea with oxygen desaturation  His respiratory event index was 20 6 and he desaturated to 70 percent  6 2 percentage of recording time was spent with oxygen saturation less than 90 percent  He  subsequently had a CPAP titration study on 06/03/2022 which showed a CPAP pressure requirement of 8 centimeters of water using a AirFit N20 nasal mask  Roxana Serum He is waiting to be set up on CPAP therapy  On clinical examination, he is borderline obese and has oropharyngeal crowding  I have advised him regarding sleep hygiene and driving  I advised him to use the CPAP regularly every night once he gets the CPAP  I had a long discussion with him and his wife and answered all their questions  Tobacco dependence  He has been a smoker and currently smokes up to 1 pack a day  I counseled him to quit smoking completely  He seem is somewhat motivated to  quit  I have ordered nicotine patch at his request  I also counseled his wife to quit smoking  Obesity (BMI 30-39  9)  He is mildly obese and has been advised to lose weight    He understands that weight reduction can improve sleep apnea  Diagnoses and all orders for this visit:    JOAQUIN (obstructive sleep apnea)    Primary hypertension    Tobacco dependence  -     nicotine (NICODERM CQ) 21 mg/24 hr TD 24 hr patch; Place 1 patch on the skin every 24 hours    Obesity (BMI 30-39  9)    Abnormal circadian rhythm  -     Ambulatory Referral to Sleep Medicine          Subjective:      Patient ID: Kristi Arriola is a 31 y o  male  Saranya Bagley has history of snoring interrupted sleep and witnessed apneas  He occasionally feels sleepy and tired during the day  He denied any waking up episodes during sleep feeling like choking or gasping for breath  He has been recently diagnosed with hypertension and his blood pressure keeps on fluctuating  He is not on any regular treatment at this time  He goes to sleep around 11 pm and wakes up around 7 a m  he has no significant problem maintaining sleep  He occasionally has difficulty going to sleep  He has occasional morning headache  He denied any shortness of breath or cough or phlegm or wheeze or chest pain  No swelling of feet  No previous history of tonsillectomy or adenoidectomy  He had an overnight home sleep study on 04/12/2022 which showed moderate obstructive sleep apnea with oxygen desaturation  His respiratory event index was 20 6 and he desaturated to 70 percent  6 2 percentage of recording time was spent with oxygen saturation less than 90 percent  He  subsequently had a CPAP titration study on 06/03/2022 which showed a CPAP pressure requirement of 8 centimeters of water using a AirFit N20 nasal mask  Montana Mckeon He is waiting to be set up on CPAP therapy  He denied any chest pain or palpitations or swelling of feet  He denied any problems driving  He has been diagnosed with hypertension  His blood pressure has been fluctuating and he has not been started on regular treatment yet  The sleep apnea is thought to significantly contribute to his blood pressure issues      He has been a smoker for a long time and smokes about a pack a day  I have counseled him to quit smoking completely  His wife also smokes  The following portions of the patient's history were reviewed and updated as appropriate: allergies, current medications, past family history, past medical history, past social history, past surgical history and problem list     Review of Systems   Constitutional: Negative for appetite change, chills, fatigue and fever  HENT: Negative for hearing loss, rhinorrhea, sneezing, sore throat, trouble swallowing and voice change  Eyes: Negative for visual disturbance  Respiratory: Negative for cough, chest tightness, shortness of breath and wheezing  Cardiovascular: Negative for chest pain, palpitations and leg swelling  Gastrointestinal: Negative for abdominal pain, constipation, diarrhea, nausea and vomiting  Genitourinary: Negative for dysuria, frequency and urgency  Musculoskeletal: Negative for arthralgias, gait problem and joint swelling  Skin: Positive for rash (Eczema; Hiradenitis)  Allergic/Immunologic: Negative for environmental allergies  Neurological: Positive for headaches  Negative for seizures and syncope  Psychiatric/Behavioral: Positive for sleep disturbance  Negative for agitation and confusion  The patient is not nervous/anxious  Objective:      /72 (BP Location: Right arm, Patient Position: Sitting, Cuff Size: Large)   Pulse 80   Temp (!) 96 4 °F (35 8 °C) (Tympanic)   Ht 5' 8" (1 727 m)   Wt 89 8 kg (198 lb)   SpO2 97%   BMI 30 11 kg/m²          Physical Exam  Vitals reviewed  Constitutional:       General: He is not in acute distress  Appearance: He is obese  He is not ill-appearing, toxic-appearing or diaphoretic  HENT:      Head: Normocephalic  Nose: Nose normal       Mouth/Throat:      Mouth: Mucous membranes are moist       Pharynx: Oropharynx is clear        Comments: Mallampathi Class 3  Eyes: General: No scleral icterus  Conjunctiva/sclera: Conjunctivae normal    Cardiovascular:      Rate and Rhythm: Normal rate and regular rhythm  Heart sounds: Normal heart sounds  No murmur heard  Pulmonary:      Effort: Pulmonary effort is normal  No respiratory distress  Breath sounds: Normal breath sounds  No stridor  No wheezing, rhonchi or rales  Chest:      Chest wall: No tenderness  Abdominal:      General: Bowel sounds are normal       Palpations: Abdomen is soft  Tenderness: There is no abdominal tenderness  There is no guarding  Musculoskeletal:      Cervical back: No rigidity  Right lower leg: No edema  Left lower leg: No edema  Lymphadenopathy:      Cervical: No cervical adenopathy  Skin:     Coloration: Skin is not jaundiced or pale  Findings: Rash present  Neurological:      Mental Status: He is alert and oriented to person, place, and time  Gait: Gait normal    Psychiatric:         Mood and Affect: Mood normal          Behavior: Behavior normal          Thought Content:  Thought content normal          Judgment: Judgment normal

## 2022-06-30 NOTE — ASSESSMENT & PLAN NOTE
Amalia Lyle has history of snoring interrupted sleep and witnessed apneas  He occasionally feels sleepy and tired during the day  He denied any waking up episodes during sleep feeling like choking or gasping for breath  He has been recently diagnosed with hypertension and his blood pressure keeps on fluctuating  He is not on any regular treatment at this time  He goes to sleep around 11 pm and wakes up around 7 a m  he has no significant problem maintaining sleep  He occasionally has difficulty going to sleep  He has occasional morning headache  He denied any shortness of breath or cough or phlegm or wheeze or chest pain  No swelling of feet  No previous history of tonsillectomy or adenoidectomy  He had an overnight home sleep study on 04/12/2022 which showed moderate obstructive sleep apnea with oxygen desaturation  His respiratory event index was 20 6 and he desaturated to 70 percent  6 2 percentage of recording time was spent with oxygen saturation less than 90 percent  He  subsequently had a CPAP titration study on 06/03/2022 which showed a CPAP pressure requirement of 8 centimeters of water using a AirFit N20 nasal mask  Juan J Kang He is waiting to be set up on CPAP therapy  On clinical examination, he is borderline obese and has oropharyngeal crowding  I have advised him regarding sleep hygiene and driving  I advised him to use the CPAP regularly every night once he gets the CPAP  I had a long discussion with him and his wife and answered all their questions

## 2022-07-05 ENCOUNTER — TELEPHONE (OUTPATIENT)
Dept: SLEEP CENTER | Facility: CLINIC | Age: 37
End: 2022-07-05

## 2022-07-05 NOTE — TELEPHONE ENCOUNTER
----- Message from Daniel Estrella DO sent at 7/1/2022 11:04 PM EDT -----  approved  ----- Message -----  From: Velvet Eisenberg  Sent: 4/1/2022   9:37 AM EDT  To: Sleep Medicine [place] Provider    This sleep study needs approval      If approved please sign and return to clerical pool  If denied please include reasons why  Also provide alternative testing if warranted  Please sign and return to clerical pool

## 2022-08-09 ENCOUNTER — TELEPHONE (OUTPATIENT)
Dept: PULMONOLOGY | Facility: CLINIC | Age: 37
End: 2022-08-09

## 2022-10-20 ENCOUNTER — OFFICE VISIT (OUTPATIENT)
Dept: PULMONOLOGY | Facility: CLINIC | Age: 37
End: 2022-10-20
Payer: COMMERCIAL

## 2022-10-20 VITALS
HEART RATE: 91 BPM | WEIGHT: 204.6 LBS | HEIGHT: 68 IN | BODY MASS INDEX: 31.01 KG/M2 | TEMPERATURE: 98.1 F | OXYGEN SATURATION: 97 % | DIASTOLIC BLOOD PRESSURE: 70 MMHG | SYSTOLIC BLOOD PRESSURE: 112 MMHG

## 2022-10-20 DIAGNOSIS — G47.33 OSA (OBSTRUCTIVE SLEEP APNEA): Primary | ICD-10-CM

## 2022-10-20 DIAGNOSIS — F17.200 TOBACCO DEPENDENCE: ICD-10-CM

## 2022-10-20 DIAGNOSIS — E66.9 OBESITY (BMI 30-39.9): ICD-10-CM

## 2022-10-20 PROCEDURE — 99214 OFFICE O/P EST MOD 30 MIN: CPT | Performed by: INTERNAL MEDICINE

## 2022-10-20 NOTE — ASSESSMENT & PLAN NOTE
Gilmer Gaitan has history of snoring interrupted sleep and witnessed apneas  He occasionally feels sleepy and tired during the day  He denied any waking up episodes during sleep feeling like choking or gasping for breath  He has been recently diagnosed with hypertension and his blood pressure keeps on fluctuating  He is not on any regular treatment at this time  He goes to sleep around 11 pm and wakes up around 7 a m  he has no significant problem maintaining sleep  He occasionally has difficulty going to sleep  He has occasional morning headache  He denied any shortness of breath or cough or phlegm or wheeze or chest pain  No swelling of feet  No previous history of tonsillectomy or adenoidectomy  He had an overnight home sleep study on 04/12/2022 which showed moderate obstructive sleep apnea with oxygen desaturation  His respiratory event index was 20 6 and he desaturated to 70 percent  6 2 percentage of recording time was spent with oxygen saturation less than 90 percent  He  subsequently had a CPAP titration study on 06/03/2022 which showed a CPAP pressure requirement of 8 centimeters of water using a AirFit N20 nasal mask  Currently he is using the CPAP and his overall compliance is good  However his 4 hour compliance is suboptimal   His residual AHI is low   On clinical examination, he was borderline obese and had oropharyngeal crowding  I advised him to use the CPAP regularly and adequately  he is getting clinical benefit from CPAP therapy and is medically necessary for him  I have advised him to continue as before     I had a long discussion with him and his wife and answered all their questions

## 2022-10-20 NOTE — PROGRESS NOTES
Assessment/Plan:    JOAQUIN (obstructive sleep apnea)  Mr Jean Benedict has history of snoring interrupted sleep and witnessed apneas  He occasionally feels sleepy and tired during the day  He denied any waking up episodes during sleep feeling like choking or gasping for breath  He has been recently diagnosed with hypertension and his blood pressure keeps on fluctuating  He is not on any regular treatment at this time  He goes to sleep around 11 pm and wakes up around 7 a m  he has no significant problem maintaining sleep  He occasionally has difficulty going to sleep  He has occasional morning headache  He denied any shortness of breath or cough or phlegm or wheeze or chest pain  No swelling of feet  No previous history of tonsillectomy or adenoidectomy  He had an overnight home sleep study on 04/12/2022 which showed moderate obstructive sleep apnea with oxygen desaturation  His respiratory event index was 20 6 and he desaturated to 70 percent  6 2 percentage of recording time was spent with oxygen saturation less than 90 percent  He  subsequently had a CPAP titration study on 06/03/2022 which showed a CPAP pressure requirement of 8 centimeters of water using a AirFit N20 nasal mask  Currently he is using the CPAP and his overall compliance is good  However his 4 hour compliance is suboptimal   His residual AHI is low   On clinical examination, he was borderline obese and had oropharyngeal crowding  I advised him to use the CPAP regularly and adequately  he is getting clinical benefit from CPAP therapy and is medically necessary for him  I have advised him to continue as before  I had a long discussion with him and his wife and answered all their questions        Tobacco dependence  He was a smoker and used to smoke up to 1 pack a day  I had  counseled him to quit smoking and prescribed nicotine patch  Currently he states that he is not smoking at or though he vapes occasionally    He is no more using nicotine patch  I have counseled him to stop vaping as well  Diagnoses and all orders for this visit:    JOAQUIN (obstructive sleep apnea)    Tobacco dependence    Obesity (BMI 30-39  9)          Subjective:      Patient ID: Oscar Canada is a 30 y o  male  Mr Granados Cable came for follow-up for his obstructive sleep apnea on CPAP therapy  Currently he is using the CPAP most of the nights and is getting accustomed to the mask and pressure  Has no significant daytime sleepiness or morning headache  He feels on comfortable with the mask  However he has been using it most of the nights though his 4 hour compliance is suboptimal   His residual AHI is 2 7 which is low  He has occasional leak  He is getting clinical benefit from CPAP therapy  His wife also sleeps better as he is no more snoring or making any funny sounds  He has stopped smoking cigarettes  He is no more using nicotine patch  Early he has vaping  I have counseled him to quit that too  He has gained few lb and he understands the need for weight reduction  The following portions of the patient's history were reviewed and updated as appropriate: allergies, current medications, past family history, past medical history, past social history, past surgical history and problem list     Review of Systems   Constitutional: Negative for appetite change, chills, fatigue, fever and unexpected weight change  HENT: Negative for hearing loss, rhinorrhea, sneezing, sore throat and trouble swallowing  Eyes: Negative for visual disturbance  Respiratory: Negative for cough, chest tightness, shortness of breath and wheezing  Cardiovascular: Negative for chest pain, palpitations and leg swelling  Gastrointestinal: Negative for abdominal pain, constipation, diarrhea, nausea and vomiting  Genitourinary: Negative for dysuria, frequency and urgency  Musculoskeletal: Negative for arthralgias, gait problem and joint swelling  Allergic/Immunologic: Negative for environmental allergies  Neurological: Negative for dizziness, syncope, light-headedness and headaches  Psychiatric/Behavioral: Negative for agitation, confusion and sleep disturbance  The patient is not nervous/anxious  Objective:      /70   Pulse 91   Temp 98 1 °F (36 7 °C)   Ht 5' 8" (1 727 m)   Wt 92 8 kg (204 lb 9 6 oz)   SpO2 97%   BMI 31 11 kg/m²          Physical Exam  Vitals reviewed  Constitutional:       General: He is not in acute distress  Appearance: He is obese  He is not ill-appearing, toxic-appearing or diaphoretic  HENT:      Head: Normocephalic  Mouth/Throat:      Mouth: Mucous membranes are moist    Eyes:      General: No scleral icterus  Conjunctiva/sclera: Conjunctivae normal    Cardiovascular:      Rate and Rhythm: Normal rate and regular rhythm  Heart sounds: Normal heart sounds  No murmur heard  No gallop  Pulmonary:      Effort: Pulmonary effort is normal  No respiratory distress  Breath sounds: Normal breath sounds  No stridor  No wheezing, rhonchi or rales  Chest:      Chest wall: No tenderness  Abdominal:      General: Bowel sounds are normal       Palpations: Abdomen is soft  Tenderness: There is no abdominal tenderness  There is no guarding  Musculoskeletal:      Cervical back: No rigidity  Right lower leg: No edema  Left lower leg: No edema  Lymphadenopathy:      Cervical: No cervical adenopathy  Skin:     Coloration: Skin is not jaundiced or pale  Findings: No rash  Neurological:      Mental Status: He is alert and oriented to person, place, and time  Gait: Gait normal    Psychiatric:         Mood and Affect: Mood normal          Behavior: Behavior normal          Thought Content: Thought content normal          Judgment: Judgment normal        I spent 30 minutes of time taking care of this patient with multiple medical issues    The majority of this time was spent directly with the patient counseling as well as coordinating care

## 2022-12-09 ENCOUNTER — OFFICE VISIT (OUTPATIENT)
Dept: CARDIOLOGY CLINIC | Facility: CLINIC | Age: 37
End: 2022-12-09

## 2022-12-09 VITALS
WEIGHT: 204 LBS | SYSTOLIC BLOOD PRESSURE: 112 MMHG | OXYGEN SATURATION: 97 % | DIASTOLIC BLOOD PRESSURE: 70 MMHG | HEART RATE: 70 BPM | HEIGHT: 68 IN | TEMPERATURE: 98.2 F | BODY MASS INDEX: 30.92 KG/M2

## 2022-12-09 DIAGNOSIS — G47.33 OSA (OBSTRUCTIVE SLEEP APNEA): ICD-10-CM

## 2022-12-09 DIAGNOSIS — R03.0 ELEVATED BP WITHOUT DIAGNOSIS OF HYPERTENSION: ICD-10-CM

## 2022-12-09 DIAGNOSIS — I10 HYPERTENSION, UNSPECIFIED TYPE: Primary | ICD-10-CM

## 2022-12-09 DIAGNOSIS — F17.200 TOBACCO DEPENDENCE: ICD-10-CM

## 2022-12-09 RX ORDER — FAMOTIDINE 20 MG/1
20 TABLET, FILM COATED ORAL DAILY
COMMUNITY

## 2022-12-09 NOTE — PROGRESS NOTES
[facility]  [address]   [place], [address]  Cardiology Consultation    Zeyad Liu  [id number]  1985      Consult for:  Elevated blood pressure/chest tightness  Appreciate consult by: Joselin Cancino MD    1  Hypertension, unspecified type  POCT ECG      2  Elevated BP without diagnosis of hypertension  POCT ECG      3  JOAQUIN (obstructive sleep apnea)  Lipoprotein NMR    Type and screen      4  Tobacco dependence  Lipoprotein NMR    Type and screen         Discussion/Summary:   JOAQUIN- compliant with cpap    Palpitations- same or better  Low HDL/triglycerides- he will take Omega 3 supplementation  He is making dietary modifications  Pre-diabetes- A1c-5 8    Smoking history- he has cut cigarette  discussed smoking cessation      HPI:   51-year-old gentleman with strong family history for hypertension presents with periodic chest tightness and elevated blood pressure  His wife states he has apneic episodes at night  She is she at times is worried he is not breathing  He reports having some daytime fatigue  He has never been formally diagnosed with sleep disordered breathing  He is also noted elevated blood pressures at times  He is concerned as he has a strong family history  He periodically feels chest tightness  He is trying to modify his diet  05/26/2022:  Still with episodes of apnea  Still with severe fatigue in the morning  I reviewed with him his abnormal sleep study  His wife has not witnessed his apneic episodes  12/9/2022: He is wearing his CPAP at night  Does notice some palpitations  However they are the same or better      Social Hx  Lives in [facility]  Diet- hard to cut back in house    Projects  Smoking- 1/2 pack day - difficult  Alcohol- seldom  Sleep - +snoring loud, stop breathing, wife noted apnea     Family Hx  Mom, aunt-htn      Past Medical History:   Diagnosis Date   • Asthma 2005   • Bloody stool    • Diverticulitis of colon    • GERD (gastroesophageal reflux disease)    • Headaches, cluster    • Hypertension     borderline high, not on meds currently  • Irregular bowel habits    • Irregular heart beat     palpitations, unusual chest discomfort   Dr Bryon Carmona recommended   • Stomach pain      Social History     Socioeconomic History   • Marital status: /Civil Union     Spouse name: Not on file   • Number of children: Not on file   • Years of education: Not on file   • Highest education level: Not on file   Occupational History   • Not on file   Tobacco Use   • Smoking status: Every Day     Packs/day: 0 50     Years: 15 00     Pack years: 7 50     Types: Cigarettes   • Smokeless tobacco: Never   Vaping Use   • Vaping Use: Former   • Start date: 3/31/2018   • Quit date: 3/31/2019   Substance and Sexual Activity   • Alcohol use: Yes     Comment: social   • Drug use: Never   • Sexual activity: Not on file   Other Topics Concern   • Not on file   Social History Narrative   • Not on file     Social Determinants of Health     Financial Resource Strain: Not on file   Food Insecurity: Not on file   Transportation Needs: Not on file   Physical Activity: Not on file   Stress: Not on file   Social Connections: Not on file   Intimate Partner Violence: Not on file   Housing Stability: Not on file      Family History   Problem Relation Age of Onset   • Diabetes Mother    • Hypertension Maternal Grandmother    • Hypertension Maternal Uncle    • Diabetes Maternal Uncle    • Cancer Maternal Grandfather    • Cancer Maternal Aunt      Past Surgical History:   Procedure Laterality Date   • COLONOSCOPY     • WISDOM TOOTH EXTRACTION         Current Outpatient Medications:   •  famotidine (PEPCID) 20 mg tablet, Take 20 mg by mouth daily, Disp: , Rfl:   •  Omega-3 Fatty Acids (fish oil) 1,000 mg, Take 1 capsule (1,000 mg total) by mouth 2 (two) times a day, Disp: 60 capsule, Rfl: 3  •  Turmeric 400 MG CAPS, Take by mouth Stopped taking but will resume, Disp: , Rfl:   •  Acetaminophen 160 MG TBDP, Take 160 mg by mouth if needed for mild pain (Patient not taking: Reported on 10/20/2022), Disp: , Rfl:   •  Ascorbic Acid (vitamin C) 1000 MG tablet, Take 1,000 mg by mouth daily Stopped taking but will resume  (Patient not taking: Reported on 10/20/2022), Disp: , Rfl:   •  aspirin (ECOTRIN LOW STRENGTH) 81 mg EC tablet, Take 81 mg by mouth daily (Patient not taking: Reported on 10/20/2022), Disp: , Rfl:   •  cholecalciferol (VITAMIN D3) 1,000 units tablet, Take 1,000 Units by mouth daily Stopped taking but will resume  (Patient not taking: Reported on 10/20/2022), Disp: , Rfl:   •  Multiple Vitamins-Minerals (Mens 50+ Advanced) CAPS, Take by mouth Stopped taking but will resume (Patient not taking: Reported on 12/9/2022), Disp: , Rfl:   •  nicotine (NICODERM CQ) 21 mg/24 hr TD 24 hr patch, Place 1 patch on the skin every 24 hours (Patient not taking: Reported on 10/20/2022), Disp: 28 patch, Rfl: 0  Allergies   Allergen Reactions   • Bee Venom Anaphylaxis     Reaction Date: [id number];      Vitals:    12/09/22 1152   BP: 112/70   BP Location: Right arm   Patient Position: Sitting   Cuff Size: Large   Pulse: 70   Temp: 98 2 °F (36 8 °C)   SpO2: 97%   Weight: 92 5 kg (204 lb)   Height: 5' 8" (1 727 m)       Review of Systems:   Review of Systems   Constitutional: Negative for fatigue  HENT: Negative  Eyes: Negative  Respiratory: Negative for shortness of breath  Cardiovascular: Positive for palpitations  Negative for chest pain  Gastrointestinal: Negative  Endocrine: Negative  Genitourinary: Negative  Musculoskeletal: Negative  Skin: Negative  Allergic/Immunologic: Negative  Neurological: Negative for dizziness  Hematological: Negative  Psychiatric/Behavioral: Negative          Vitals:    12/09/22 1152   BP: 112/70   BP Location: Right arm   Patient Position: Sitting   Cuff Size: Large   Pulse: 70   Temp: 98 2 °F (36 8 °C)   SpO2: 97%   Weight: 92 5 kg (204 lb)   Height: 5' 8" (1 727 m)     Physical Examination:   Physical Exam  Constitutional:       General: He is not in acute distress  Appearance: He is well-developed  He is not diaphoretic  HENT:      Head: Normocephalic and atraumatic  Right Ear: External ear normal       Left Ear: External ear normal    Eyes:      General: No scleral icterus  Right eye: No discharge  Left eye: No discharge  Conjunctiva/sclera: Conjunctivae normal       Pupils: Pupils are equal, round, and reactive to light  Neck:      Thyroid: No thyromegaly  Vascular: No JVD  Trachea: No tracheal deviation  Cardiovascular:      Rate and Rhythm: Normal rate and regular rhythm  Heart sounds: No murmur heard  No friction rub  No gallop  Pulmonary:      Effort: Pulmonary effort is normal  No respiratory distress  Breath sounds: Normal breath sounds  No stridor  No wheezing or rales  Chest:      Chest wall: No tenderness  Abdominal:      General: Bowel sounds are normal  There is no distension  Palpations: Abdomen is soft  There is no mass  Tenderness: There is no abdominal tenderness  There is no guarding or rebound  Musculoskeletal:         General: No tenderness or deformity  Normal range of motion  Cervical back: Normal range of motion and neck supple  Skin:     General: Skin is warm and dry  Coloration: Skin is not pale  Findings: No erythema or rash  Neurological:      Mental Status: He is alert and oriented to person, place, and time  Cranial Nerves: No cranial nerve deficit  Motor: No abnormal muscle tone  Coordination: Coordination normal       Deep Tendon Reflexes: Reflexes are normal and symmetric  Reflexes normal    Psychiatric:         Behavior: Behavior normal          Thought Content:  Thought content normal          Judgment: Judgment normal          Labs:     Lab Results Component Value Date    WBC 8 41 04/18/2022    HGB 17 0 04/18/2022    HCT 54 1 (H) 04/18/2022    MCV 87 04/18/2022    RDW 13 1 04/18/2022     04/18/2022     BMP:  Lab Results   Component Value Date    SODIUM 140 04/18/2022    K 4 2 04/18/2022     04/18/2022    CO2 29 04/18/2022    BUN 11 04/18/2022    CREATININE 1 12 04/18/2022    GLUC 76 04/18/2022    CALCIUM 9 2 04/18/2022    EGFR 85 04/18/2022     LFT:  Lab Results   Component Value Date    AST 20 04/18/2022    ALT 49 04/18/2022    ALKPHOS 92 04/18/2022    TP 8 4 (H) 04/18/2022    ALB 4 2 04/18/2022      No results found for: [facility]  Lab Results   Component Value Date    HGBA1C 5 8 (H) 09/30/2021     Lipid Profile:   Lab Results   Component Value Date    CHOLESTEROL 197 09/30/2021    HDL 36 (L) 09/30/2021    LDLCALC 133 (H) 09/30/2021    TRIG 154 (H) 09/30/2021     Lab Results   Component Value Date    CHOLESTEROL 197 09/30/2021     No results found for: CKTOTAL, CKMB, CKMBINDEX, TROPONINI  No results found for: NTBNP   No results found for this or any previous visit (from the past 672 hour(s))  Imaging & Testing   I have personally reviewed pertinent reports  Cardiac Testing     EKG: Personally reviewed  Normal sinus rhythm no acute ST T wave changes      Christy Shoemaker  [phone]  Please call with any questions or suggestions    Counseling :  A description of the counseling:   Goals and Barriers:  Patient's ability to self care:  Medication side effect reviewed with patient in detail and all their questions answered  "Portions of the record may have been created with voice recognition software  Occasional wrong word or "sound a like" substitutions may have occurred due to the inherent limitations of voice recognition software  Read the chart carefully and recognize, using context, where substitutions have occurred   Please call if you have any questions  "

## 2023-09-24 ENCOUNTER — OFFICE VISIT (OUTPATIENT)
Dept: URGENT CARE | Facility: CLINIC | Age: 38
End: 2023-09-24
Payer: COMMERCIAL

## 2023-09-24 VITALS
RESPIRATION RATE: 18 BRPM | TEMPERATURE: 96 F | HEART RATE: 72 BPM | SYSTOLIC BLOOD PRESSURE: 141 MMHG | BODY MASS INDEX: 29.62 KG/M2 | HEIGHT: 69 IN | OXYGEN SATURATION: 97 % | DIASTOLIC BLOOD PRESSURE: 89 MMHG | WEIGHT: 200 LBS

## 2023-09-24 DIAGNOSIS — R19.7 DIARRHEA, UNSPECIFIED TYPE: ICD-10-CM

## 2023-09-24 DIAGNOSIS — B96.89 ACUTE BACTERIAL SINUSITIS: Primary | ICD-10-CM

## 2023-09-24 DIAGNOSIS — J01.90 ACUTE BACTERIAL SINUSITIS: Primary | ICD-10-CM

## 2023-09-24 PROCEDURE — 99213 OFFICE O/P EST LOW 20 MIN: CPT | Performed by: PHYSICIAN ASSISTANT

## 2023-09-24 RX ORDER — AMOXICILLIN AND CLAVULANATE POTASSIUM 875; 125 MG/1; MG/1
1 TABLET, FILM COATED ORAL EVERY 12 HOURS SCHEDULED
Qty: 14 TABLET | Refills: 0 | Status: SHIPPED | OUTPATIENT
Start: 2023-09-24 | End: 2023-10-01

## 2023-09-24 RX ORDER — AMOXICILLIN AND CLAVULANATE POTASSIUM 875; 125 MG/1; MG/1
1 TABLET, FILM COATED ORAL EVERY 12 HOURS SCHEDULED
Qty: 14 TABLET | Refills: 0 | Status: SHIPPED | OUTPATIENT
Start: 2023-09-24 | End: 2023-09-24 | Stop reason: CLARIF

## 2023-09-24 NOTE — LETTER
September 24, 2023     Patient: Jasbir Martinez   YOB: 1985   Date of Visit: 9/24/2023       To Whom It May Concern: It is my medical opinion that Katharine Kebede may return to work on 09/25/2023 . If you have any questions or concerns, please don't hesitate to call.          Sincerely,        Dave Keys PA-C    CC: No Recipients

## 2023-09-24 NOTE — PATIENT INSTRUCTIONS
Antibiotic as prescribed. Switch back to over-the-counter allergy medications including Flonase. Symptoms are improved in 2 to 3 days follow-up with PCP. If symptoms worsen or new symptoms develop report to the emergency room. Take Pepto-Bismol as needed according to bottle or packaging. This will turn your stools grey or black and that will continue for 2-3 days after stopping the medication as well. Make sure you are hydrating. Recommend Gatorade Zero as this will help balance your electrolytes as well. BRAT diet (broth, bananas, rice, apples, toast, crackers) for duration of symptoms and 2-3 days following in small meals or snacks throughout the day. If symptoms do not resolve in 3-5 days, follow-up with PCP. If you develop severe abdominal cramping, blood in your stool, or fever and chills. Report to the emergency department.

## 2023-09-24 NOTE — PROGRESS NOTES
[facility]        NAME: Rex Crabtree is a 41 y.o. male  : 1985    MRN: [id number]  DATE: 2023  TIME: 1:02 PM    Assessment and Plan   Acute bacterial sinusitis [J01.90, B96.89]  1. Acute bacterial sinusitis  amoxicillin-clavulanate (AUGMENTIN) 875-125 mg per tablet      2. Diarrhea, unspecified type        Patient presents with symptoms and examination consistent with bacterial sinusitis. He was stuck due to amoxicillin to treat. Discussed brat diet and Pepto-Bismol for diarrhea      Patient Instructions   Patient Instructions   Antibiotic as prescribed. Switch back to over-the-counter allergy medications including Flonase. Symptoms are improved in 2 to 3 days follow-up with PCP. If symptoms worsen or new symptoms develop report to the emergency room. Take Pepto-Bismol as needed according to bottle or packaging. This will turn your stools grey or black and that will continue for 2-3 days after stopping the medication as well. Make sure you are hydrating. Recommend Gatorade Zero as this will help balance your electrolytes as well. BRAT diet (broth, bananas, rice, apples, toast, crackers) for duration of symptoms and 2-3 days following in small meals or snacks throughout the day. If symptoms do not resolve in 3-5 days, follow-up with PCP. If you develop severe abdominal cramping, blood in your stool, or fever and chills. Report to the emergency department. Follow up with PCP in 3-5 days. Proceed to  ER if symptoms worsen. Chief Complaint     Chief Complaint   Patient presents with   • Nasal Congestion     Started a few weeks ago, also reports Diarrhea, and head congestion, and excessive ear wax. History of Present Illness       61-year-old male presents with complaint of runny nose, congestion, and throat irritation for approximately 2 weeks.   Patient reports that symptoms have worsened in the last week and he is having a lot of sinus pressure, and headaches. Patient denies any fever or chills and cough. Patient notes that he has had diarrhea for almost a week as well reporting looser stools than normal and more frequent stools than normal.  He denies any hematochezia. Review of Systems   Review of Systems   Constitutional: Negative for chills and fever. HENT: Positive for congestion, rhinorrhea, sinus pressure, sinus pain and sneezing. Respiratory: Negative for shortness of breath. Gastrointestinal: Positive for diarrhea. Negative for anal bleeding, blood in stool, constipation, nausea and vomiting.          Current Medications       Current Outpatient Medications:   •  Acetaminophen 160 MG TBDP, Take 160 mg by mouth if needed for mild pain, Disp: , Rfl:   •  amoxicillin-clavulanate (AUGMENTIN) 875-125 mg per tablet, Take 1 tablet by mouth every 12 (twelve) hours for 7 days, Disp: 14 tablet, Rfl: 0  •  Ascorbic Acid (vitamin C) 1000 MG tablet, Take 1,000 mg by mouth daily Stopped taking but will resume, Disp: , Rfl:   •  aspirin (ECOTRIN LOW STRENGTH) 81 mg EC tablet, Take 81 mg by mouth daily, Disp: , Rfl:   •  cholecalciferol (VITAMIN D3) 1,000 units tablet, Take 1,000 Units by mouth daily Stopped taking but will resume, Disp: , Rfl:   •  famotidine (PEPCID) 20 mg tablet, Take 20 mg by mouth daily, Disp: , Rfl:   •  Multiple Vitamins-Minerals (Mens 50+ Advanced) CAPS, Take by mouth Stopped taking but will resume, Disp: , Rfl:   •  Omega-3 Fatty Acids (fish oil) 1,000 mg, Take 1 capsule (1,000 mg total) by mouth 2 (two) times a day, Disp: 60 capsule, Rfl: 3  •  Turmeric 400 MG CAPS, Take by mouth Stopped taking but will resume, Disp: , Rfl:   •  nicotine (NICODERM CQ) 21 mg/24 hr TD 24 hr patch, Place 1 patch on the skin every 24 hours (Patient not taking: Reported on 10/20/2022), Disp: 28 patch, Rfl: 0    Current Allergies     Allergies as of 09/24/2023 - Reviewed 09/24/2023   Allergen Reaction Noted   • Bee venom Anaphylaxis 01/30/2006 The following portions of the patient's history were reviewed and updated as appropriate: allergies, current medications, past family history, past medical history, past social history, past surgical history and problem list.     Past Medical History:   Diagnosis Date   • Asthma 2005   • Bloody stool    • Diverticulitis of colon    • GERD (gastroesophageal reflux disease)    • Headaches, cluster    • Hypertension     borderline high, not on meds currently. • Irregular bowel habits    • Irregular heart beat     palpitations, unusual chest discomfort. Dr Stephania Starks recommended   • Stomach pain        Past Surgical History:   Procedure Laterality Date   • COLONOSCOPY     • WISDOM TOOTH EXTRACTION         Family History   Problem Relation Age of Onset   • Diabetes Mother    • Hypertension Maternal Grandmother    • Hypertension Maternal Uncle    • Diabetes Maternal Uncle    • Cancer Maternal Grandfather    • Cancer Maternal Aunt          Medications have been verified. Objective   /89   Pulse 72   Temp (!) 96 °F (35.6 °C)   Resp 18   Ht 5' 9" (1.753 m)   Wt 90.7 kg (200 lb)   SpO2 97%   BMI 29.53 kg/m²   No LMP for male patient. Physical Exam     Physical Exam  Vitals and nursing note reviewed. Constitutional:       General: He is awake. He is not in acute distress. Appearance: Normal appearance. He is well-developed and well-groomed. He is not ill-appearing, toxic-appearing or diaphoretic. HENT:      Head: Normocephalic and atraumatic. Jaw: No trismus. Right Ear: Hearing, tympanic membrane, ear canal and external ear normal. There is no impacted cerumen. No foreign body. Left Ear: Hearing, tympanic membrane, ear canal and external ear normal. There is no impacted cerumen. No foreign body. Nose: Mucosal edema, congestion and rhinorrhea present. No nasal deformity. Rhinorrhea is clear. Right Nostril: No foreign body, epistaxis or occlusion.       Left Nostril: No foreign body, epistaxis or occlusion. Right Turbinates: Enlarged and swollen. Not pale. Left Turbinates: Enlarged and swollen. Not pale. Right Sinus: No maxillary sinus tenderness or frontal sinus tenderness. Left Sinus: No maxillary sinus tenderness or frontal sinus tenderness. Mouth/Throat:      Lips: Pink. No lesions. Mouth: Mucous membranes are moist. No injury, oral lesions or angioedema. Dentition: Normal dentition. Tongue: No lesions. Tongue does not deviate from midline. Palate: No mass and lesions. Pharynx: Uvula midline. Pharyngeal swelling and posterior oropharyngeal erythema present. No oropharyngeal exudate or uvula swelling. Tonsils: No tonsillar exudate or tonsillar abscesses. Eyes:      General: Lids are normal. Vision grossly intact. Gaze aligned appropriately. No allergic shiner. Extraocular Movements: Extraocular movements intact. Cardiovascular:      Rate and Rhythm: Normal rate and regular rhythm. Heart sounds: Normal heart sounds, S1 normal and S2 normal. Heart sounds not distant. No murmur heard. No friction rub. No gallop. Pulmonary:      Effort: Pulmonary effort is normal.      Breath sounds: Normal breath sounds. No decreased breath sounds, wheezing, rhonchi or rales. Comments: Patient is speaking in full sentences with no increased respiratory effort. No audible wheezing or stridor. Musculoskeletal:      Cervical back: Normal range of motion. Lymphadenopathy:      Cervical: Cervical adenopathy present. Right cervical: Superficial cervical adenopathy present. Left cervical: Superficial cervical adenopathy present. Skin:     General: Skin is warm and dry. Neurological:      Mental Status: He is alert and oriented to person, place, and time. Coordination: Coordination is intact. Gait: Gait is intact.    Psychiatric:         Attention and Perception: Attention and perception normal.         Mood and Affect: Mood and affect normal.         Speech: Speech normal.         Behavior: Behavior normal. Behavior is cooperative. Note: Portions of this record may have been created with voice recognition software. Occasional wrong word or "sound a like" substitutions may have occurred due to the inherent limitations of voice recognition software. Please read the chart carefully and recognize, using context, where substitutions have occurred. *

## 2023-12-07 LAB
ABO GROUP BLD: NORMAL
BLD GP AB SCN SERPL QL: NEGATIVE
HDL SERPL-SCNC: 29.5 UMOL/L
LDL SERPL QN: 21 NM
LDL SERPL QN: 850 NMOL/L
LDL SERPL-SCNC: 1650 NMOL/L
LP-IR SCORE SERPL: 75
RH BLD: POSITIVE

## 2024-01-05 ENCOUNTER — OFFICE VISIT (OUTPATIENT)
Dept: CARDIOLOGY CLINIC | Facility: CLINIC | Age: 39
End: 2024-01-05
Payer: COMMERCIAL

## 2024-01-05 VITALS
DIASTOLIC BLOOD PRESSURE: 82 MMHG | SYSTOLIC BLOOD PRESSURE: 142 MMHG | OXYGEN SATURATION: 98 % | BODY MASS INDEX: 31.1 KG/M2 | HEART RATE: 67 BPM | HEIGHT: 69 IN | WEIGHT: 210 LBS

## 2024-01-05 DIAGNOSIS — E78.2 MIXED HYPERLIPIDEMIA: ICD-10-CM

## 2024-01-05 DIAGNOSIS — I10 HYPERTENSION, UNSPECIFIED TYPE: ICD-10-CM

## 2024-01-05 DIAGNOSIS — R03.0 ELEVATED BP WITHOUT DIAGNOSIS OF HYPERTENSION: Primary | ICD-10-CM

## 2024-01-05 PROCEDURE — 99214 OFFICE O/P EST MOD 30 MIN: CPT | Performed by: INTERNAL MEDICINE

## 2024-01-05 PROCEDURE — 93000 ELECTROCARDIOGRAM COMPLETE: CPT | Performed by: INTERNAL MEDICINE

## 2024-01-05 RX ORDER — HYDROCHLOROTHIAZIDE 12.5 MG/1
12.5 TABLET ORAL EVERY MORNING
Qty: 90 TABLET | Refills: 1 | Status: SHIPPED | OUTPATIENT
Start: 2024-01-05

## 2024-01-05 RX ORDER — CRANBERRY FRUIT EXTRACT 200 MG
CAPSULE ORAL
Qty: 180 CAPSULE | Refills: 1 | Status: SHIPPED | OUTPATIENT
Start: 2024-01-05

## 2024-01-05 RX ORDER — LORATADINE 10 MG/1
10 TABLET ORAL DAILY
COMMUNITY

## 2024-01-05 NOTE — PATIENT INSTRUCTIONS
Heart Healthy Diet   WHAT YOU NEED TO KNOW:   A heart healthy diet is an eating plan low in unhealthy fats and sodium (salt). The plan is high in healthy fats and fiber. A heart healthy diet helps improve your cholesterol levels and lowers your risk for heart disease and stroke. A dietitian will teach you how to read and understand food labels.  DISCHARGE INSTRUCTIONS:   Heart healthy diet guidelines to follow:   Choose foods that contain healthy fats:      Unsaturated fats  include monounsaturated and polyunsaturated fats. Unsaturated fat is found in foods such as soybean, canola, olive, corn, and safflower oils. It is also found in soft tub margarine that is made with liquid vegetable oil.    Omega-3 fat  is found in certain fish, such as salmon, tuna, and trout, and in walnuts and flaxseed. Eat fish high in omega-3 fats at least 2 times a week.       Limit or do not have unhealthy fats:      Cholesterol  is found in animal foods, such as eggs and lobster, and in dairy products made from whole milk. Limit cholesterol to less than 200 mg each day.    Saturated fat  is found in meats, such as isaac and hamburger. It is also found in chicken or turkey skin, whole milk, and butter. Limit saturated fat to less than 7% of your total daily calories.    Trans fat  is found in packaged foods, such as potato chips and cookies. It is also in hard margarine, some fried foods, and shortening. Do not eat foods that contain trans fats.    Get 20 to 30 grams of fiber each day.  Fruits, vegetables, whole-grain foods, and legumes (cooked beans) are good sources of fiber.         Limit sodium as directed.  You may be told to limit sodium, such as to 2,000 mg or less each day. Choose low-sodium or no-salt-added foods. Add little or no salt to food you prepare. Use herbs and spices in place of salt.       Include the following in your heart healthy plan:  Ask your dietitian or healthcare provider how many servings to have each day  from the following food groups:  Grains:      Whole-wheat breads, cereals, and pastas, and brown rice    Low-fat, low-sodium crackers and chips    Vegetables:      Broccoli, green beans, green peas, and spinach    Collards, kale, and lima beans    Carrots, sweet potatoes, tomatoes, and peppers    Canned vegetables with no salt added    Fruits:      Bananas, peaches, pears, and pineapple    Grapes, raisins, and dates    Oranges, tangerines, grapefruit, orange juice, and grapefruit juice    Apricots, mangoes, melons, and papaya    Raspberries and strawberries    Canned fruit with no added sugar    Low-fat dairy:      Nonfat (skim) milk, 1% milk, and low-fat almond, cashew, or soy milks fortified with calcium    Low-fat cheese, regular or frozen yogurt, and cottage cheese    Meats and proteins:      Lean cuts of beef and pork (loin, leg, round), skinless chicken and turkey    Legumes, soy products, egg whites, or nuts    Limit or do not include the following in your heart healthy plan:   Foods and liquids that contain unhealthy fats and oils:      Whole or 2% milk, cream cheese, sour cream, or cheese    High-fat cuts of beef (T-bone steaks, ribs), chicken or turkey with skin, and organ meats such as liver    Butter, stick margarine, shortening, and cooking oils such as coconut or palm oil    Foods and liquids high in sodium:      Packaged foods, such as frozen dinners, cookies, macaroni and cheese, and cereals with more than 300 mg of sodium per serving    Vegetables with added sodium, such as instant potatoes, vegetables with added sauces, or regular canned vegetables    Cured or smoked meats, such as hot dogs, isaac, and sausage    High-sodium ketchup, barbecue sauce, salad dressing, pickles, olives, soy sauce, or miso    Foods and liquids high in sugar:      Candy, cake, cookies, pies, or doughnuts    Soft drinks (soda), sports drinks, or sweetened tea    Canned or dry mixes for cakes, soups, sauces, or  gravies    Other healthy heart guidelines:   Do not smoke.  Nicotine and other chemicals in cigarettes and cigars can cause lung and heart damage. Ask your healthcare provider for information if you currently smoke and need help to quit. E-cigarettes or smokeless tobacco still contain nicotine. Talk to your provider before you use these products.    Limit or do not drink alcohol as directed.  Alcohol can damage your heart and raise your blood pressure. Your provider may give you specific daily and weekly limits. The general recommended limit is 1 drink a day for women 21 or older and for men 65 or older. Do not have more than 3 drinks within 24 hours or 7 within a week. The recommended limit is 2 drinks a day for men 21 to 64 years of age. Do not have more than 4 drinks within 24 hours or 14 within a week. A drink of alcohol is 12 ounces of beer, 5 ounces of wine, or 1½ ounces of liquor.    Maintain a healthy weight.  Extra body weight makes your heart work harder. Ask your provider what a healthy weight is for you. He or she can help you create a safe weight loss plan, if needed.    Exercise regularly.  Exercise can help you maintain a healthy weight and improve your blood pressure and cholesterol levels. Regular exercise can also decrease your risk for heart problems. Ask your provider about the best exercise plan for you. Do not start an exercise program without asking your provider.          Follow up with your doctor or cardiologist as directed:  Write down your questions so you remember to ask them during your visits.  © Copyright [employer] 2023 Information is for End User's use only and may not be sold, redistributed or otherwise used for commercial purposes.  The above information is an  only. It is not intended as medical advice for individual conditions or treatments. Talk to your doctor, nurse or pharmacist before following any medical regimen to see if it is safe and effective for  you.      Low-Sodium Diet   WHAT YOU NEED TO KNOW:   A low-sodium diet limits foods that are high in sodium (salt). You will need to follow a low-sodium diet if you have high blood pressure, kidney disease, or heart failure. You may also need to follow this diet if you have a condition that is causing your body to retain (hold) extra fluid. You may need to limit the amount of sodium you eat in a day to 1,500 to 2,000 mg. Ask your healthcare provider how much sodium you can have each day.  DISCHARGE INSTRUCTIONS:   How to use food labels to choose foods that are low in sodium:  Read food labels to find the amount of sodium they contain. The amount of sodium is listed in milligrams (mg). The % Daily Value (DV) column tells you how much of your daily needs are met by 1 serving of the food for each nutrient listed. Choose foods that have less than 5% of the DV of sodium. These foods are considered low in sodium. Foods that have 20% or more of the DV of sodium are considered high in sodium. Some food labels may also list any of the following terms that tell you about the sodium content in the food:  Sodium-free:  Less than 5 mg in each serving    Very low sodium:  35 mg of sodium or less in each serving    Low sodium:  140 mg of sodium or less in each serving    Reduced sodium:  At least 25% less sodium in each serving than the regular type    Light in sodium:  50% less sodium in each serving    Unsalted or no added salt:  No extra salt is added during processing (the food may still contain sodium)       Foods to avoid:  Salty foods are high in sodium. You should avoid the following:  Processed foods:      Mixes for cornbread, biscuits, cake, and pudding     Instant foods, such as potatoes, cereals, noodles, and rice     Packaged foods, such as bread stuffing, rice and pasta mixes, snack dip mixes, and macaroni and cheese     Canned foods, such as canned vegetables, soups, broths, sauces, and vegetable or tomato  juice    Snack foods, such as salted chips, popcorn, pretzels, pork rinds, salted crackers, and salted nuts    Frozen foods, such as dinners, entrees, vegetables with sauces, and breaded meats    Sauerkraut, pickled vegetables, and other foods prepared in brine    Meats and cheeses:      Smoked or cured meat, such as corned beef, isaac, ham, hot dogs, and sausage    Canned meats or spreads, such as potted meats, sardines, anchovies, and imitation seafood    Deli or lunch meats, such as bologna, ham, turkey, and roast beef    Processed cheese, such as American cheese and cheese spreads    Condiments, sauces, and seasonings:      Salt (¼ teaspoon of salt contains 575 mg of sodium)    Seasonings made with salt, such as garlic salt, celery salt, onion salt, and seasoned salt    Regular soy sauce, barbecue sauce, teriyaki sauce, steak sauce, Worcestershire sauce, and most flavored vinegars    Canned gravy and mixes     Regular condiments, such as mustard, ketchup, and salad dressings    Pickles and olives    Meat tenderizers and monosodium glutamate (MSG)    Foods to include:  Read the food label to find the amount of sodium in each serving.  Bread and cereal:  Try to choose breads with less than 80 mg of sodium per serving.     Bread, roll, vanessa, tortilla, or unsalted crackers.    Ready-to-eat cereals with less than 5% DV of sodium (examples include shredded wheat and puffed rice)    Pasta    Vegetables and fruits:      Unsalted fresh, frozen, or canned vegetables    Fresh, frozen, or canned fruits    Fruit juice    Dairy:  One serving has about 150 mg of sodium.    Milk, all types    Yogurt    Hard cheese, such as cheddar, Swiss, [place] eddie, or mozzarella    Meat and other protein foods:  Some raw meats may have added sodium.     Plain meats, fish, and poultry     Egg    Other foods:      Homemade pudding    Unsalted nuts, popcorn, or pretzels    Unsalted butter or margarine    Ways to get less sodium:  If you are  used to the flavor of salt, it will take time to get used to low-sodium foods. Your healthcare provider or dietitian can help you create a plan for lowering sodium. The plan will be specific to your needs and your family's needs. You may focus on 1 or 2 changes each week, such as the following:  Add spices and herbs to foods instead of salt during cooking.  Use salt-free seasonings to add flavor to foods. Examples include onion powder, garlic powder, basil, huff powder, paprika, and parsley. Try lemon or lime juice or vinegar to give foods a tart flavor. Use hot peppers, pepper, or cayenne pepper to add a spicy flavor.    Do not keep a salt shaker at your kitchen table.  This may help keep you from adding salt to food at the table. A teaspoon of salt has 2,300 mg of sodium. It may take time to get used to enjoying the natural flavor of food instead of adding salt. Talk to your healthcare provider before you use salt substitutes. Some salt substitutes have a high amount of a chemical called potassium chloride. This form of potassium needs to be avoided if you have kidney disease.    Choose low-sodium foods at restaurants.  Meals from restaurants are often high in sodium. Some restaurants have nutrition information on the menu that tells you the amount of sodium in their foods. If possible, ask for your food to be prepared with less, or no salt.    Shop for unsalted or low-sodium foods and snacks at the grocery store.  Examples include unsalted or low-sodium broths, soups, and canned vegetables. Choose fresh or frozen vegetables instead. Choose unsalted nuts or seeds or fresh fruits or vegetables as snacks. Read food labels and choose salt-free, very low-sodium, or low-sodium foods. You can also rinse canned vegetables under running water to remove extra sodium before you cook them.    © Copyright [employer] 2023 Information is for End User's use only and may not be sold, redistributed or otherwise used for commercial  purposes.  The above information is an  only. It is not intended as medical advice for individual conditions or treatments. Talk to your doctor, nurse or pharmacist before following any medical regimen to see if it is safe and effective for you.

## 2024-01-05 NOTE — PROGRESS NOTES
[facility]  [address] [place] [address] [address]   [place] [address]  Cardiology Consultation    Jean Guerrero  [id number]  1985      Consult for:  Elevated blood pressure/chest tightness  Appreciate consult by: Alisha Azar MD    1. Elevated BP without diagnosis of hypertension  POCT ECG    Red Yeast Rice Extract 600 MG CAPS    Lipid Panel With Apolipoprotein B (ApoB)    Basic metabolic panel      2. Hypertension, unspecified type  POCT ECG    hydrochlorothiazide (HYDRODIURIL) 12.5 mg tablet    Red Yeast Rice Extract 600 MG CAPS    Lipid Panel With Apolipoprotein B (ApoB)    Basic metabolic panel      3. Mixed hyperlipidemia  Red Yeast Rice Extract 600 MG CAPS    Lipid Panel With Apolipoprotein B (ApoB)    Basic metabolic panel         Discussion/Summary:   Elevated blood pressure-he has multiple blood pressure readings elevated.  We will start him on low-dose thiazide diuretic.  He will make a blood pressure journal and submit.  He will target less than 2 g sodium diet    JOAQUIN- compliant with cpap    Low HDL/triglycerides-we reviewed his recent lipid panel.  He will try red yeast rice.  Will have him repeat a lipid panel in 3 months.  If his lipids remain elevated consideration to add low-dose statin    Pre-diabetes- A1c-5.8    Smoking history- he has cut cigarette. discussed smoking cessation      HPI:   36-year-old gentleman with strong family history for hypertension presents with periodic chest tightness and elevated blood pressure.  His wife states he has apneic episodes at night.  She is she at times is worried he is not breathing.  He reports having some daytime fatigue.  He has never been formally diagnosed with sleep disordered breathing.  He is also noted elevated blood pressures at times.  He is concerned as he has a strong family history.  He periodically feels chest tightness.  He is trying to modify his diet.    05/26/2022:  Still with episodes of apnea.   Still with severe fatigue in the morning.  I reviewed with him his abnormal sleep study.  His wife has not witnessed his apneic episodes.    12/9/2022: He is wearing his CPAP at night.  Does notice some palpitations.  However they are the same or better.    1/5/2024: We reviewed his recent blood pressure.  He reports having some sodium in his diet.  We also reviewed his blood work.  He denies having chest heaviness.  He denies having dizziness or lightheadedness.  He is compliant with his CPAP.    Social Hx  Lives in [facility]  Diet- hard to cut back in house    Projects  Smoking- 1/2 pack day - difficult  Alcohol- seldom  Sleep - +snoring loud, stop breathing, wife noted apnea     Family Hx  Mom, aunt-htn      Past Medical History:   Diagnosis Date    Asthma 2005    Bloody stool     Diverticulitis of colon     GERD (gastroesophageal reflux disease)     Headaches, cluster     Hypertension     borderline high, not on meds currently.    Irregular bowel habits     Irregular heart beat     palpitations, unusual chest discomfort. Dr Cheung recommended    Stomach pain      Social History     Socioeconomic History    Marital status: /Civil Union     Spouse name: Not on file    Number of children: Not on file    Years of education: Not on file    Highest education level: Not on file   Occupational History    Not on file   Tobacco Use    Smoking status: Former     Current packs/day: 0.50     Average packs/day: 0.5 packs/day for 15.0 years (7.5 ttl pk-yrs)     Types: Cigarettes    Smokeless tobacco: Never   Vaping Use    Vaping status: Former    Start date: 3/31/2018    Quit date: 3/31/2019   Substance and Sexual Activity    Alcohol use: Yes     Comment: social    Drug use: Never    Sexual activity: Not on file   Other Topics Concern    Not on file   Social History Narrative    Not on file     Social Determinants of Health     Financial Resource Strain: Not on file   Food Insecurity: Not on file    Transportation Needs: Not on file   Physical Activity: Not on file   Stress: Not on file   Social Connections: Not on file   Intimate Partner Violence: Not on file   Housing Stability: Not on file      Family History   Problem Relation Age of Onset    Diabetes Mother     Hypertension Maternal Grandmother     Hypertension Maternal Uncle     Diabetes Maternal Uncle     Cancer Maternal Grandfather     Cancer Maternal Aunt      Past Surgical History:   Procedure Laterality Date    COLONOSCOPY      WISDOM TOOTH EXTRACTION         Current Outpatient Medications:     famotidine (PEPCID) 20 mg tablet, Take 20 mg by mouth daily, Disp: , Rfl:     loratadine (CLARITIN) 10 mg tablet, Take 10 mg by mouth daily, Disp: , Rfl:     Acetaminophen 160 MG TBDP, Take 160 mg by mouth if needed for mild pain (Patient not taking: Reported on 1/5/2024), Disp: , Rfl:     Ascorbic Acid (vitamin C) 1000 MG tablet, Take 1,000 mg by mouth daily Stopped taking but will resume (Patient not taking: Reported on 1/5/2024), Disp: , Rfl:     aspirin (ECOTRIN LOW STRENGTH) 81 mg EC tablet, Take 81 mg by mouth daily (Patient not taking: Reported on 1/5/2024), Disp: , Rfl:     cholecalciferol (VITAMIN D3) 1,000 units tablet, Take 1,000 Units by mouth daily Stopped taking but will resume (Patient not taking: Reported on 1/5/2024), Disp: , Rfl:     Multiple Vitamins-Minerals (Mens 50+ Advanced) CAPS, Take by mouth Stopped taking but will resume (Patient not taking: Reported on 1/5/2024), Disp: , Rfl:     nicotine (NICODERM CQ) 21 mg/24 hr TD 24 hr patch, Place 1 patch on the skin every 24 hours (Patient not taking: Reported on 10/20/2022), Disp: 28 patch, Rfl: 0    Omega-3 Fatty Acids (fish oil) 1,000 mg, Take 1 capsule (1,000 mg total) by mouth 2 (two) times a day (Patient not taking: Reported on 1/5/2024), Disp: 60 capsule, Rfl: 3    Turmeric 400 MG CAPS, Take by mouth Stopped taking but will resume (Patient not taking: Reported on 1/5/2024), Disp: ,  "Rfl:   Allergies   Allergen Reactions    Bee Venom Anaphylaxis     Reaction Date: 30Jan2006;      Vitals:    01/05/24 1558   BP: 142/82   BP Location: Right arm   Patient Position: Sitting   Cuff Size: Standard   Pulse: 67   SpO2: 98%   Weight: 95.3 kg (210 lb)   Height: 5' 9\" (1.753 m)       Review of Systems:   Review of Systems   Constitutional:  Negative for fatigue.   HENT: Negative.     Eyes: Negative.    Respiratory:  Negative for shortness of breath.    Cardiovascular:  Positive for palpitations. Negative for chest pain.   Gastrointestinal: Negative.    Endocrine: Negative.    Genitourinary: Negative.    Musculoskeletal: Negative.    Skin: Negative.    Allergic/Immunologic: Negative.    Neurological:  Negative for dizziness.   Hematological: Negative.    Psychiatric/Behavioral: Negative.         Vitals:    01/05/24 1558   BP: 142/82   BP Location: Right arm   Patient Position: Sitting   Cuff Size: Standard   Pulse: 67   SpO2: 98%   Weight: 95.3 kg (210 lb)   Height: 5' 9\" (1.753 m)     Physical Examination:   Physical Exam  Constitutional:       General: He is not in acute distress.     Appearance: He is well-developed. He is not diaphoretic.   HENT:      Head: Normocephalic and atraumatic.      Right Ear: External ear normal.      Left Ear: External ear normal.   Eyes:      General: No scleral icterus.        Right eye: No discharge.         Left eye: No discharge.      Conjunctiva/sclera: Conjunctivae normal.      Pupils: Pupils are equal, round, and reactive to light.   Neck:      Thyroid: No thyromegaly.      Vascular: No JVD.      Trachea: No tracheal deviation.   Cardiovascular:      Rate and Rhythm: Normal rate and regular rhythm.      Heart sounds: No murmur heard.     No friction rub. No gallop.   Pulmonary:      Effort: Pulmonary effort is normal. No respiratory distress.      Breath sounds: Normal breath sounds. No stridor. No wheezing or rales.   Chest:      Chest wall: No tenderness. " "  Abdominal:      General: Bowel sounds are normal. There is no distension.      Palpations: Abdomen is soft. There is no mass.      Tenderness: There is no abdominal tenderness. There is no guarding or rebound.   Musculoskeletal:         General: No tenderness or deformity. Normal range of motion.      Cervical back: Normal range of motion and neck supple.   Skin:     General: Skin is warm and dry.      Coloration: Skin is not pale.      Findings: No erythema or rash.   Neurological:      Mental Status: He is alert and oriented to person, place, and time.      Cranial Nerves: No cranial nerve deficit.      Motor: No abnormal muscle tone.      Coordination: Coordination normal.      Deep Tendon Reflexes: Reflexes are normal and symmetric. Reflexes normal.   Psychiatric:         Behavior: Behavior normal.         Thought Content: Thought content normal.         Judgment: Judgment normal.         Labs:     Lab Results   Component Value Date    WBC 8.41 04/18/2022    HGB 17.0 04/18/2022    HCT 54.1 (H) 04/18/2022    MCV 87 04/18/2022    RDW 13.1 04/18/2022     04/18/2022     BMP:  Lab Results   Component Value Date    SODIUM 140 04/18/2022    K 4.2 04/18/2022     04/18/2022    CO2 29 04/18/2022    BUN 11 04/18/2022    CREATININE 1.12 04/18/2022    GLUC 76 04/18/2022    CALCIUM 9.2 04/18/2022    EGFR 85 04/18/2022     LFT:  Lab Results   Component Value Date    AST 20 04/18/2022    ALT 49 04/18/2022    ALKPHOS 92 04/18/2022    TP 8.4 (H) 04/18/2022    ALB 4.2 04/18/2022      No results found for: \"CAD2BZJOAYJD\"  Lab Results   Component Value Date    HGBA1C 5.8 (H) 09/30/2021     Lipid Profile:   Lab Results   Component Value Date    CHOLESTEROL 197 09/30/2021    HDL 36 (L) 09/30/2021    LDLCALC 133 (H) 09/30/2021    TRIG 154 (H) 09/30/2021     Lab Results   Component Value Date    CHOLESTEROL 197 09/30/2021     No results found for: \"CKTOTAL\", \"CKMB\", \"CKMBINDEX\", \"TROPONINI\"  No results found for: \"NTBNP\" " "  No results found for this or any previous visit (from the past 672 hour(s)).    Imaging & Testing   I have personally reviewed pertinent reports.      Cardiac Testing     EKG: Personally reviewed.    Normal sinus rhythm no acute ST T wave changes      Edgar Cheung MD [facility] [place]  [phone]  Please call with any questions or suggestions    Counseling :  A description of the counseling:   Goals and Barriers:  Patient's ability to self care:  Medication side effect reviewed with patient in detail and all their questions answered.    \"Portions of the record may have been created with voice recognition software. Occasional wrong word or \"sound a like\" substitutions may have occurred due to the inherent limitations of voice recognition software. Read the chart carefully and recognize, using context, where substitutions have occurred. Please call if you have any questions. \"    "

## 2024-02-19 ENCOUNTER — TELEPHONE (OUTPATIENT)
Dept: CARDIOLOGY CLINIC | Facility: CLINIC | Age: 39
End: 2024-02-19

## 2024-02-19 NOTE — TELEPHONE ENCOUNTER
Patient called stating that he got a large bill from lab regarding recent blood work and cost for labs.     He had gone to - [facility] in [place] and stated something to do with coding.     Phone # - [phone]

## 2024-02-21 ENCOUNTER — TELEPHONE (OUTPATIENT)
Dept: CARDIOLOGY CLINIC | Facility: CLINIC | Age: 39
End: 2024-02-21

## 2024-02-21 NOTE — TELEPHONE ENCOUNTER
Patient had called with issues regarding a bill he received from [facility] - I called [facility] to try and attain covered codes, however they could not assist.     The lipoprotein was not covered by insurance as they deem it experimental.     Do you have any recommendations?    If we use a new code fax number is [phone]

## 2024-02-21 NOTE — TELEPHONE ENCOUNTER
I left a detailed message for patient to call back and discuss process.  I made another telephone encounter to Dr. Cheung discussing that lipoprotein is considered an experimental test and is denied by insurance.   [facility] had also suggested possibly having the patient call insurance and see what can be done to cover.

## 2024-05-17 ENCOUNTER — OFFICE VISIT (OUTPATIENT)
Dept: URGENT CARE | Facility: CLINIC | Age: 39
End: 2024-05-17
Payer: COMMERCIAL

## 2024-05-17 VITALS
HEART RATE: 88 BPM | RESPIRATION RATE: 18 BRPM | SYSTOLIC BLOOD PRESSURE: 142 MMHG | OXYGEN SATURATION: 99 % | BODY MASS INDEX: 28.26 KG/M2 | WEIGHT: 191.4 LBS | DIASTOLIC BLOOD PRESSURE: 92 MMHG | TEMPERATURE: 98 F

## 2024-05-17 DIAGNOSIS — B30.9 ACUTE VIRAL CONJUNCTIVITIS OF BOTH EYES: ICD-10-CM

## 2024-05-17 DIAGNOSIS — B96.89 ACUTE BACTERIAL BRONCHITIS: Primary | ICD-10-CM

## 2024-05-17 DIAGNOSIS — J20.8 ACUTE BACTERIAL BRONCHITIS: Primary | ICD-10-CM

## 2024-05-17 PROBLEM — Z13.83 ENCOUNTER FOR SCREENING FOR RESPIRATORY DISORDER NEC: Status: ACTIVE | Noted: 2017-08-23

## 2024-05-17 PROBLEM — H40.009 PREGLAUCOMA: Status: ACTIVE | Noted: 2017-08-23

## 2024-05-17 PROBLEM — Z71.3 DIETARY COUNSELING AND SURVEILLANCE: Status: ACTIVE | Noted: 2020-02-21

## 2024-05-17 PROBLEM — M25.50 PAIN IN UNSPECIFIED JOINT: Status: ACTIVE | Noted: 2017-08-23

## 2024-05-17 PROBLEM — Z72.0 TOBACCO USE: Status: ACTIVE | Noted: 2017-08-23

## 2024-05-17 PROBLEM — E66.3 OVERWEIGHT: Status: ACTIVE | Noted: 2017-08-23

## 2024-05-17 PROBLEM — R51.9 HEADACHE: Status: ACTIVE | Noted: 2017-08-23

## 2024-05-17 PROBLEM — E66.09 OTHER OBESITY DUE TO EXCESS CALORIES: Status: ACTIVE | Noted: 2020-02-21

## 2024-05-17 PROBLEM — E55.9 VITAMIN D DEFICIENCY: Status: ACTIVE | Noted: 2020-02-21

## 2024-05-17 PROBLEM — Z00.01 ENCOUNTER FOR GENERAL ADULT MEDICAL EXAMINATION WITH ABNORMAL FINDINGS: Status: ACTIVE | Noted: 2017-08-23

## 2024-05-17 PROCEDURE — 99213 OFFICE O/P EST LOW 20 MIN: CPT

## 2024-05-17 RX ORDER — PREDNISONE 20 MG/1
40 TABLET ORAL DAILY
Qty: 10 TABLET | Refills: 0 | Status: SHIPPED | OUTPATIENT
Start: 2024-05-17 | End: 2024-05-22

## 2024-05-17 RX ORDER — ALBUTEROL SULFATE 90 UG/1
2 AEROSOL, METERED RESPIRATORY (INHALATION) EVERY 6 HOURS PRN
Qty: 6.7 G | Refills: 0 | Status: SHIPPED | OUTPATIENT
Start: 2024-05-17

## 2024-05-17 RX ORDER — AMOXICILLIN AND CLAVULANATE POTASSIUM 875; 125 MG/1; MG/1
1 TABLET, FILM COATED ORAL EVERY 12 HOURS SCHEDULED
Qty: 14 TABLET | Refills: 0 | Status: SHIPPED | OUTPATIENT
Start: 2024-05-17 | End: 2024-05-24

## 2024-05-17 NOTE — PATIENT INSTRUCTIONS
Take antibiotic and steroids as directed for the next week. Complete entire course of therapy even if symptoms improve and take medication with food to avoid upset stomach. Use inhaler as needed for acute episodes of shortness of breath. Follow-up with PCP in 3-5 days if no improvement of symptoms. Report to the ER sooner if symptoms worsen.

## 2024-05-23 NOTE — PROGRESS NOTES
Progress Note - Cardiology Office  [facility]    Jean Guerrero 38 y.o. male MRN: [id number]  : 1985  Encounter: [id number]      ASSESSMENT:   Essential hypertension  Blood pressure is well-controlled at 116/80 mmHg  On hydrochlorothiazide 12.5 mg  However the patient wants to discontinue hydrochlorothiazide since he has to urinate very frequently at work which is inconvenient for him    Mixed hyperlipidemia  2021: , , HDL 36  Was prescribed red yeast rice extract 600 mg caps x 2 ?    JOAQUIN, compliant with CPAP    History of tobacco use    Stress test 2022  Negative for ischemia    Ambulatory extended Holter monitor    RECOMMENDATIONS:  Repeat lipid profile and hepatic function panel  Patient will come back for a nurse visit to recheck his blood pressure after which we will decide if he still needs an antihypertensive.    He will hold his diuretic for 1 week before coming for the nurse visit.  He is also going to bring his home blood pressure machine for comparison      Please call [phone] if any questions.    HPI :     Jean Guerrero is a 38 y.o. year old male who came for follow up.  He generally feels well but is not comfortable continuing on the diuretics since he has 2 urinate frequently at work.  He has lost some weight and is watching his diet and trying to reduce salt intake as much as possible.  His blood pressure today is 116/80 mmHg  He is going to hold the diuretic for a week and come back for a nurse visit for us to recheck his blood pressure and decide on a alternate antihypertensive medication    REVIEW OF SYSTEMS:  Denies any new or acute cardiac symptoms.  Denies chest pain, dyspnea, palpitations or syncope  Does complain of frequent urination and dehydration/dry mouth with hydrochlorothiazide      Historical Information   Past Medical History:   Diagnosis Date    Asthma 2005    Bloody stool     Diverticulitis of colon     GERD  (gastroesophageal reflux disease)     Headaches, cluster     Hypertension     borderline high, not on meds currently.    Irregular bowel habits     Irregular heart beat     palpitations, unusual chest discomfort. Dr Cheung recommended    Stomach pain      Past Surgical History:   Procedure Laterality Date    COLONOSCOPY      WISDOM TOOTH EXTRACTION       Social History     Substance and Sexual Activity   Alcohol Use Yes    Comment: social     Social History     Substance and Sexual Activity   Drug Use Never     Social History     Tobacco Use   Smoking Status Former    Current packs/day: 0.50    Average packs/day: 0.5 packs/day for 15.0 years (7.5 ttl pk-yrs)    Types: Cigarettes   Smokeless Tobacco Never     Family History:   Family History   Problem Relation Age of Onset    Diabetes Mother     Hypertension Maternal Grandmother     Hypertension Maternal Uncle     Diabetes Maternal Uncle     Cancer Maternal Grandfather     Cancer Maternal Aunt        Meds/Allergies     Allergies   Allergen Reactions    Bee Venom Anaphylaxis     Reaction Date: 30Jan2006;        Current Outpatient Medications:     albuterol (Proventil HFA) 90 mcg/act inhaler, Inhale 2 puffs every 6 (six) hours as needed for wheezing, Disp: 6.7 g, Rfl: 0    amoxicillin-clavulanate (AUGMENTIN) 875-125 mg per tablet, Take 1 tablet by mouth every 12 (twelve) hours for 7 days, Disp: 14 tablet, Rfl: 0    famotidine (PEPCID) 20 mg tablet, Take 20 mg by mouth daily, Disp: , Rfl:     Acetaminophen 160 MG TBDP, Take 160 mg by mouth if needed for mild pain (Patient not taking: Reported on 1/5/2024), Disp: , Rfl:     Ascorbic Acid (vitamin C) 1000 MG tablet, Take 1,000 mg by mouth daily Stopped taking but will resume (Patient not taking: Reported on 1/5/2024), Disp: , Rfl:     aspirin (ECOTRIN LOW STRENGTH) 81 mg EC tablet, Take 81 mg by mouth daily (Patient not taking: Reported on 1/5/2024), Disp: , Rfl:     cholecalciferol (VITAMIN D3) 1,000 units tablet, Take  "1,000 Units by mouth daily Stopped taking but will resume (Patient not taking: Reported on 1/5/2024), Disp: , Rfl:     loratadine (CLARITIN) 10 mg tablet, Take 10 mg by mouth daily (Patient not taking: Reported on 5/17/2024), Disp: , Rfl:     Multiple Vitamins-Minerals (Mens 50+ Advanced) CAPS, Take by mouth Stopped taking but will resume (Patient not taking: Reported on 1/5/2024), Disp: , Rfl:     nicotine (NICODERM CQ) 21 mg/24 hr TD 24 hr patch, Place 1 patch on the skin every 24 hours (Patient not taking: Reported on 10/20/2022), Disp: 28 patch, Rfl: 0    Omega-3 Fatty Acids (fish oil) 1,000 mg, Take 1 capsule (1,000 mg total) by mouth 2 (two) times a day (Patient not taking: Reported on 1/5/2024), Disp: 60 capsule, Rfl: 3    Red Yeast Rice Extract 600 MG CAPS, Take 1200mg daily (Patient not taking: Reported on 5/17/2024), Disp: 180 capsule, Rfl: 1    Turmeric 400 MG CAPS, Take by mouth Stopped taking but will resume (Patient not taking: Reported on 1/5/2024), Disp: , Rfl:     Vitals: Blood pressure 116/80, pulse 92, height 5' 9\" (1.753 m), weight 83.9 kg (185 lb), SpO2 97%.    Body mass index is 27.32 kg/m².  Vitals:    05/24/24 1302   Weight: 83.9 kg (185 lb)     BP Readings from Last 3 Encounters:   05/24/24 116/80   05/17/24 142/92   01/05/24 142/82       Physical Exam:  Physical Exam    Neurologic:  Alert & oriented x 3, no new focal deficits, Not in any acute distress,  Constitutional:  Well developed, well nourished, non-toxic appearance   Eyes:  Pupil equal and reacting to light, conjunctiva normal,   HENT:  Atraumatic, oropharynx moist, Neck- normal range of motion, no tenderness,  Neck supple, No JVP, No LNP   Respiratory:  Bilateral air entry, mostly clear to auscultation  Cardiovascular: S1-S2 regular rhythm  GI:  Soft, nondistended, normal bowel sounds, nontender, no hepatosplenomegaly appreciated.  Musculoskeletal:  No tenderness, no deformities.   Skin:  Well hydrated, no rash   Lymphatic:  No " "lymphadenopathy noted   Extremities:  No edema and distal pulses are present        Diagnostic Studies Review Cardio:  Stress test 04/14/2022  Negative for ischemia    Cardiac testing:     Chest X-Ray:   No Chest XR results available for this patient.    CT-scan of the chest:     No CTA results available for this patient.  Lab Review   Lab Results   Component Value Date    WBC 8.41 04/18/2022    HGB 17.0 04/18/2022    HCT 54.1 (H) 04/18/2022    MCV 87 04/18/2022    RDW 13.1 04/18/2022     04/18/2022     BMP:  Lab Results   Component Value Date    SODIUM 140 04/18/2022    K 4.2 04/18/2022     04/18/2022    CO2 29 04/18/2022    BUN 11 04/18/2022    CREATININE 1.12 04/18/2022    GLUC 76 04/18/2022    CALCIUM 9.2 04/18/2022    EGFR 85 04/18/2022     LFT:  Lab Results   Component Value Date    AST 20 04/18/2022    ALT 49 04/18/2022    ALKPHOS 92 04/18/2022    TP 8.4 (H) 04/18/2022    ALB 4.2 04/18/2022      No components found for: \"TSH3\"  No results found for: \"EEU2RKACRPXL\"  Lab Results   Component Value Date    HGBA1C 5.8 (H) 09/30/2021     Lipid Profile:   Lab Results   Component Value Date    CHOLESTEROL 197 09/30/2021    HDL 36 (L) 09/30/2021    LDLCALC 133 (H) 09/30/2021    TRIG 154 (H) 09/30/2021     Lab Results   Component Value Date    CHOLESTEROL 197 09/30/2021     No results found for: \"CKTOTAL\", \"CKMB\", \"CKMBINDEX\", \"TROPONINI\"  No results found for: \"NTBNP\"   No results found for this or any previous visit (from the past 672 hour(s)).          Dr. Terrance Anderson MD, FACC      \"This note has been constructed using a voice recognition system.Therefore there may be syntax, spelling, and/or grammatical errors. Please call if you have any questions. \"  "

## 2024-05-24 ENCOUNTER — OFFICE VISIT (OUTPATIENT)
Dept: CARDIOLOGY CLINIC | Facility: CLINIC | Age: 39
End: 2024-05-24
Payer: COMMERCIAL

## 2024-05-24 VITALS
SYSTOLIC BLOOD PRESSURE: 116 MMHG | DIASTOLIC BLOOD PRESSURE: 80 MMHG | BODY MASS INDEX: 27.4 KG/M2 | OXYGEN SATURATION: 97 % | HEIGHT: 69 IN | HEART RATE: 92 BPM | WEIGHT: 185 LBS

## 2024-05-24 DIAGNOSIS — E78.2 MIXED HYPERLIPIDEMIA: Primary | ICD-10-CM

## 2024-05-24 PROCEDURE — 99214 OFFICE O/P EST MOD 30 MIN: CPT | Performed by: INTERNAL MEDICINE

## 2024-05-24 NOTE — PATIENT INSTRUCTIONS
Nurse visit in 2 weeks to recheck blood pressure and compare with patient's home blood pressure machine

## 2024-05-31 ENCOUNTER — HOSPITAL ENCOUNTER (INPATIENT)
Facility: HOSPITAL | Age: 39
LOS: 3 days | Discharge: HOME | DRG: 639 | End: 2024-06-03
Attending: EMERGENCY MEDICINE | Admitting: INTERNAL MEDICINE
Payer: COMMERCIAL

## 2024-05-31 ENCOUNTER — APPOINTMENT (EMERGENCY)
Dept: CT IMAGING | Facility: HOSPITAL | Age: 39
DRG: 639 | End: 2024-05-31
Payer: COMMERCIAL

## 2024-05-31 ENCOUNTER — OFFICE VISIT (OUTPATIENT)
Dept: FAMILY MEDICINE CLINIC | Facility: CLINIC | Age: 39
End: 2024-05-31
Payer: COMMERCIAL

## 2024-05-31 ENCOUNTER — TELEPHONE (OUTPATIENT)
Age: 39
End: 2024-05-31

## 2024-05-31 ENCOUNTER — APPOINTMENT (EMERGENCY)
Dept: RADIOLOGY | Facility: HOSPITAL | Age: 39
DRG: 639 | End: 2024-05-31
Payer: COMMERCIAL

## 2024-05-31 VITALS
BODY MASS INDEX: 27.4 KG/M2 | DIASTOLIC BLOOD PRESSURE: 72 MMHG | WEIGHT: 185 LBS | SYSTOLIC BLOOD PRESSURE: 110 MMHG | HEIGHT: 69 IN | TEMPERATURE: 98.7 F | RESPIRATION RATE: 14 BRPM | HEART RATE: 72 BPM

## 2024-05-31 DIAGNOSIS — E11.9 DIABETES (HCC): Primary | ICD-10-CM

## 2024-05-31 DIAGNOSIS — R07.9 ACUTE CHEST PAIN: ICD-10-CM

## 2024-05-31 DIAGNOSIS — R03.0 ELEVATED BLOOD PRESSURE READING: ICD-10-CM

## 2024-05-31 DIAGNOSIS — E11.65 TYPE 2 DIABETES MELLITUS WITH HYPERGLYCEMIA, WITHOUT LONG-TERM CURRENT USE OF INSULIN (HCC): ICD-10-CM

## 2024-05-31 DIAGNOSIS — R63.4 WEIGHT LOSS: Primary | ICD-10-CM

## 2024-05-31 DIAGNOSIS — E11.9 DIABETES MELLITUS, NEW ONSET (HCC): ICD-10-CM

## 2024-05-31 PROBLEM — E78.5 DYSLIPIDEMIA: Status: ACTIVE | Noted: 2024-05-31

## 2024-05-31 PROBLEM — J45.909 ASTHMA: Status: ACTIVE | Noted: 2024-05-31

## 2024-05-31 PROBLEM — E78.1 HYPERTRIGLYCERIDEMIA: Status: ACTIVE | Noted: 2024-05-31

## 2024-05-31 LAB
ALBUMIN SERPL BCP-MCNC: 4 G/DL (ref 3.5–5)
ALP SERPL-CCNC: 81 U/L (ref 34–104)
ALT SERPL W P-5'-P-CCNC: 18 U/L (ref 7–52)
ANION GAP SERPL CALCULATED.3IONS-SCNC: 5 MMOL/L (ref 4–13)
ANION GAP SERPL CALCULATED.3IONS-SCNC: 9 MMOL/L (ref 4–13)
AST SERPL W P-5'-P-CCNC: 15 U/L (ref 13–39)
B-OH-BUTYR SERPL-MCNC: 0.07 MMOL/L (ref 0.02–0.27)
BACTERIA UR QL AUTO: NORMAL /HPF
BASE EX.OXY STD BLDV CALC-SCNC: 92.4 % (ref 60–80)
BASE EXCESS BLDV CALC-SCNC: -8.1 MMOL/L
BASOPHILS # BLD AUTO: 0.04 THOUSANDS/ÂΜL (ref 0–0.1)
BASOPHILS NFR BLD AUTO: 1 % (ref 0–1)
BILIRUB SERPL-MCNC: 0.68 MG/DL (ref 0.2–1)
BILIRUB UR QL STRIP: NEGATIVE
BUN SERPL-MCNC: 8 MG/DL (ref 5–25)
BUN SERPL-MCNC: 9 MG/DL (ref 5–25)
CALCIUM SERPL-MCNC: 8.2 MG/DL (ref 8.4–10.2)
CALCIUM SERPL-MCNC: 8.8 MG/DL (ref 8.4–10.2)
CARDIAC TROPONIN I PNL SERPL HS: <2 NG/L
CHLORIDE SERPL-SCNC: 101 MMOL/L (ref 96–108)
CHLORIDE SERPL-SCNC: 96 MMOL/L (ref 96–108)
CHOLEST SERPL-MCNC: 181 MG/DL
CLARITY UR: CLEAR
CO2 SERPL-SCNC: 24 MMOL/L (ref 21–32)
CO2 SERPL-SCNC: 26 MMOL/L (ref 21–32)
COLOR UR: COLORLESS
CREAT SERPL-MCNC: 0.93 MG/DL (ref 0.6–1.3)
CREAT SERPL-MCNC: 1.14 MG/DL (ref 0.6–1.3)
EOSINOPHIL # BLD AUTO: 0.06 THOUSAND/ÂΜL (ref 0–0.61)
EOSINOPHIL NFR BLD AUTO: 1 % (ref 0–6)
ERYTHROCYTE [DISTWIDTH] IN BLOOD BY AUTOMATED COUNT: 11.9 % (ref 11.6–15.1)
GFR SERPL CREATININE-BSD FRML MDRD: 105 ML/MIN/1.73SQ M
GFR SERPL CREATININE-BSD FRML MDRD: 82 ML/MIN/1.73SQ M
GLUCOSE SERPL-MCNC: 241 MG/DL (ref 65–140)
GLUCOSE SERPL-MCNC: 434 MG/DL (ref 65–140)
GLUCOSE SERPL-MCNC: 552 MG/DL (ref 65–140)
GLUCOSE SERPL-MCNC: 580 MG/DL (ref 65–140)
GLUCOSE SERPL-MCNC: 590 MG/DL (ref 65–140)
GLUCOSE UR STRIP-MCNC: ABNORMAL MG/DL
HCO3 BLDV-SCNC: 13.5 MMOL/L (ref 24–30)
HCT VFR BLD AUTO: 46.4 % (ref 36.5–49.3)
HDLC SERPL-MCNC: 36 MG/DL
HGB BLD-MCNC: 15.3 G/DL (ref 12–17)
HGB UR QL STRIP.AUTO: NEGATIVE
IMM GRANULOCYTES # BLD AUTO: 0.01 THOUSAND/UL (ref 0–0.2)
IMM GRANULOCYTES NFR BLD AUTO: 0 % (ref 0–2)
KETONES UR STRIP-MCNC: NEGATIVE MG/DL
LDLC SERPL CALC-MCNC: 103 MG/DL (ref 0–100)
LEUKOCYTE ESTERASE UR QL STRIP: ABNORMAL
LIPASE SERPL-CCNC: 94 U/L (ref 11–82)
LYMPHOCYTES # BLD AUTO: 2.4 THOUSANDS/ÂΜL (ref 0.6–4.47)
LYMPHOCYTES NFR BLD AUTO: 46 % (ref 14–44)
MAGNESIUM SERPL-MCNC: 1.9 MG/DL (ref 1.9–2.7)
MCH RBC QN AUTO: 27.5 PG (ref 26.8–34.3)
MCHC RBC AUTO-ENTMCNC: 33 G/DL (ref 31.4–37.4)
MCV RBC AUTO: 84 FL (ref 82–98)
MONOCYTES # BLD AUTO: 0.37 THOUSAND/ÂΜL (ref 0.17–1.22)
MONOCYTES NFR BLD AUTO: 7 % (ref 4–12)
NEUTROPHILS # BLD AUTO: 2.4 THOUSANDS/ÂΜL (ref 1.85–7.62)
NEUTS SEG NFR BLD AUTO: 45 % (ref 43–75)
NITRITE UR QL STRIP: NEGATIVE
NON-SQ EPI CELLS URNS QL MICRO: NORMAL /HPF
NRBC BLD AUTO-RTO: 0 /100 WBCS
O2 CT BLDV-SCNC: 15.2 ML/DL
PCO2 BLDV: 19.1 MM HG (ref 42–50)
PH BLDV: 7.47 [PH] (ref 7.3–7.4)
PH UR STRIP.AUTO: 6.5 [PH]
PHOSPHATE SERPL-MCNC: 2.3 MG/DL (ref 2.7–4.5)
PLATELET # BLD AUTO: 248 THOUSANDS/UL (ref 149–390)
PMV BLD AUTO: 12.3 FL (ref 8.9–12.7)
PO2 BLDV: 147 MM HG (ref 35–45)
POTASSIUM SERPL-SCNC: 4 MMOL/L (ref 3.5–5.3)
POTASSIUM SERPL-SCNC: 4.3 MMOL/L (ref 3.5–5.3)
PROT SERPL-MCNC: 7.1 G/DL (ref 6.4–8.4)
PROT UR STRIP-MCNC: NEGATIVE MG/DL
RBC # BLD AUTO: 5.56 MILLION/UL (ref 3.88–5.62)
RBC #/AREA URNS AUTO: NORMAL /HPF
SODIUM SERPL-SCNC: 129 MMOL/L (ref 135–147)
SODIUM SERPL-SCNC: 132 MMOL/L (ref 135–147)
SP GR UR STRIP.AUTO: >=1.05 (ref 1–1.03)
TRIGL SERPL-MCNC: 209 MG/DL
TSH SERPL DL<=0.05 MIU/L-ACNC: 0.93 UIU/ML (ref 0.45–4.5)
UROBILINOGEN UR STRIP-ACNC: <2 MG/DL
WBC # BLD AUTO: 5.28 THOUSAND/UL (ref 4.31–10.16)
WBC #/AREA URNS AUTO: NORMAL /HPF

## 2024-05-31 PROCEDURE — 36415 COLL VENOUS BLD VENIPUNCTURE: CPT

## 2024-05-31 PROCEDURE — 93005 ELECTROCARDIOGRAM TRACING: CPT

## 2024-05-31 PROCEDURE — 80053 COMPREHEN METABOLIC PANEL: CPT | Performed by: EMERGENCY MEDICINE

## 2024-05-31 PROCEDURE — 86337 INSULIN ANTIBODIES: CPT

## 2024-05-31 PROCEDURE — 81001 URINALYSIS AUTO W/SCOPE: CPT | Performed by: EMERGENCY MEDICINE

## 2024-05-31 PROCEDURE — 83519 RIA NONANTIBODY: CPT

## 2024-05-31 PROCEDURE — 96360 HYDRATION IV INFUSION INIT: CPT

## 2024-05-31 PROCEDURE — 82948 REAGENT STRIP/BLOOD GLUCOSE: CPT

## 2024-05-31 PROCEDURE — 99214 OFFICE O/P EST MOD 30 MIN: CPT | Performed by: FAMILY MEDICINE

## 2024-05-31 PROCEDURE — 96361 HYDRATE IV INFUSION ADD-ON: CPT

## 2024-05-31 PROCEDURE — 84100 ASSAY OF PHOSPHORUS: CPT

## 2024-05-31 PROCEDURE — 85025 COMPLETE CBC W/AUTO DIFF WBC: CPT | Performed by: EMERGENCY MEDICINE

## 2024-05-31 PROCEDURE — 80061 LIPID PANEL: CPT

## 2024-05-31 PROCEDURE — 99285 EMERGENCY DEPT VISIT HI MDM: CPT | Performed by: EMERGENCY MEDICINE

## 2024-05-31 PROCEDURE — 71045 X-RAY EXAM CHEST 1 VIEW: CPT

## 2024-05-31 PROCEDURE — 82805 BLOOD GASES W/O2 SATURATION: CPT

## 2024-05-31 PROCEDURE — 83735 ASSAY OF MAGNESIUM: CPT

## 2024-05-31 PROCEDURE — 80048 BASIC METABOLIC PNL TOTAL CA: CPT

## 2024-05-31 PROCEDURE — 86341 ISLET CELL ANTIBODY: CPT

## 2024-05-31 PROCEDURE — 84443 ASSAY THYROID STIM HORMONE: CPT

## 2024-05-31 PROCEDURE — 82010 KETONE BODYS QUAN: CPT | Performed by: EMERGENCY MEDICINE

## 2024-05-31 PROCEDURE — 83036 HEMOGLOBIN GLYCOSYLATED A1C: CPT

## 2024-05-31 PROCEDURE — 84484 ASSAY OF TROPONIN QUANT: CPT | Performed by: EMERGENCY MEDICINE

## 2024-05-31 PROCEDURE — 94760 N-INVAS EAR/PLS OXIMETRY 1: CPT

## 2024-05-31 PROCEDURE — 83690 ASSAY OF LIPASE: CPT | Performed by: EMERGENCY MEDICINE

## 2024-05-31 PROCEDURE — 99285 EMERGENCY DEPT VISIT HI MDM: CPT

## 2024-05-31 PROCEDURE — 74177 CT ABD & PELVIS W/CONTRAST: CPT

## 2024-05-31 RX ORDER — CALCIUM CARBONATE 500 MG/1
500 TABLET, CHEWABLE ORAL 3 TIMES DAILY PRN
Status: DISCONTINUED | OUTPATIENT
Start: 2024-05-31 | End: 2024-06-03 | Stop reason: HOSPADM

## 2024-05-31 RX ORDER — ACETAMINOPHEN 325 MG/1
975 TABLET ORAL EVERY 8 HOURS PRN
Status: DISCONTINUED | OUTPATIENT
Start: 2024-05-31 | End: 2024-06-03 | Stop reason: HOSPADM

## 2024-05-31 RX ORDER — LORATADINE 10 MG/1
10 TABLET ORAL DAILY
Status: DISCONTINUED | OUTPATIENT
Start: 2024-06-01 | End: 2024-06-03 | Stop reason: HOSPADM

## 2024-05-31 RX ORDER — ALBUTEROL SULFATE 90 UG/1
2 AEROSOL, METERED RESPIRATORY (INHALATION) EVERY 6 HOURS PRN
Status: DISCONTINUED | OUTPATIENT
Start: 2024-05-31 | End: 2024-06-03 | Stop reason: HOSPADM

## 2024-05-31 RX ORDER — FAMOTIDINE 20 MG/1
20 TABLET, FILM COATED ORAL DAILY
Status: DISCONTINUED | OUTPATIENT
Start: 2024-06-01 | End: 2024-06-03 | Stop reason: HOSPADM

## 2024-05-31 RX ORDER — SODIUM CHLORIDE AND POTASSIUM CHLORIDE 150; 900 MG/100ML; MG/100ML
125 INJECTION, SOLUTION INTRAVENOUS CONTINUOUS
Status: DISCONTINUED | OUTPATIENT
Start: 2024-05-31 | End: 2024-06-01

## 2024-05-31 RX ORDER — ENOXAPARIN SODIUM 100 MG/ML
40 INJECTION SUBCUTANEOUS DAILY
Status: DISCONTINUED | OUTPATIENT
Start: 2024-06-01 | End: 2024-05-31

## 2024-05-31 RX ORDER — LANOLIN ALCOHOL/MO/W.PET/CERES
3 CREAM (GRAM) TOPICAL
Status: DISCONTINUED | OUTPATIENT
Start: 2024-05-31 | End: 2024-06-03 | Stop reason: HOSPADM

## 2024-05-31 RX ADMIN — SODIUM CHLORIDE AND POTASSIUM CHLORIDE 125 ML/HR: .9; .15 SOLUTION INTRAVENOUS at 21:59

## 2024-05-31 RX ADMIN — IOHEXOL 100 ML: 350 INJECTION, SOLUTION INTRAVENOUS at 18:48

## 2024-05-31 RX ADMIN — SODIUM CHLORIDE 1000 ML: 0.9 INJECTION, SOLUTION INTRAVENOUS at 17:59

## 2024-05-31 RX ADMIN — SODIUM CHLORIDE 12 UNITS/HR: 9 INJECTION, SOLUTION INTRAVENOUS at 22:00

## 2024-05-31 NOTE — ED ATTENDING ATTESTATION
5/31/2024  I, Hernan Ellis MD, saw and evaluated the patient. I have discussed the patient with the resident/non-physician practitioner and agree with the resident's/non-physician practitioner's findings, Plan of Care, and MDM as documented in the resident's/non-physician practitioner's note, except where noted. All available labs and Radiology studies were reviewed.  I was present for key portions of any procedure(s) performed by the resident/non-physician practitioner and I was immediately available to provide assistance.       At this point I agree with the current assessment done in the Emergency Department.  I have conducted an independent evaluation of this patient a history and physical is as follows:    History    Patient is a 38-year-old male, with a history significant for asthma, hypertension, tobacco use per my review the medical record, who presents to the ED today, at the recommendation of his PCP, due to new onset diabetes.  Patient states that, over the last 1.5 to 2 months, he has had gradual onset and constant and slowly progressive polyuria and polydipsia.  Starting 3 weeks ago, however, patient states his symptoms rapidly progressed and he is also lost 30 pounds in that interval of time.  He also describes the onset of atraumatic, nonradiating, nonpleuritic, nonexertional chest pain, characterized as a pressure, over the 3 weeks as well as also been constant.  Patient also describes predominantly right lower quadrant alternates as well.  Patient was also recently treated with steroids for bronchitis.  Patient has not taken anything, such as Motrin or Tylenol, to remit his symptoms.    Per patient's wife, present in room and friend collateral history, patient is not confused.    Patient is without other concerns at this time.     ROS  Patient denies: Fever; dysphagia; vision change; dysuria; rash; weakness; numbness; difficulty walking; confusion    Physical Exam    GENERAL  APPEARANCE: NAD  NEURO: Patient is speaking clearly in complete sentences.  Patient is answering appropriately and able follow commands.  Patient is moving all four extremities spontaneously.  No facial droop.  Tongue midline.  HEENT: PERRL, Moist mucous membranes, external ears normal, nose normal  Neck: No cervical adenopathy  CV: RRR. No murmurs, rubs, gallops  LUNGS: Clear to auscultation: No wheezes, stridor, rhonchi, rales  GI: Abdomen non-distended. Soft. Non-tender and without rebound or guarding   : Deferred at this time  MSK: No deformity.   Skin: Warm and dry  Capillary refill: <2 seconds    Patient is currently afebrile and hemodynamically stable    Assessment/Plan/MDM  Chest pain, abdominal pain, hyperglycemia, polyuria, polydipsia  -This presentation is concerning for: New onset diabetes.  I also considered DKA, pancreatitis.  I also considered ACS, ketonemia, electrolyte abnormality.  Patient at risk for pneumonia/UTI/appendicitis.  Given normal mental status and normotension, no reason to suspect HHS at this time.   -Will investigate with cardiac workup, lipase, magnesium, beta hydroxybutyrate, UA, CT abdomen/pelvis  -Will manage with fluids and further based upon workup    ED Course  ED Course as of 05/31/24 2057   Fri May 31, 2024   1718 WBC: 5.28  WNL   1726 POC Glucose(!!): 580  High   1751 ANION GAP: 9  WNL   1752 LIPASE(!): 94  Slight elevation   1804 ECG per my independent interpretation: Normal rate, regular rhythm, no ectopy, normal axis, no ST elevations or depressions     1848 hs TnI 0hr: <2  WNL given chronicity of symptoms greater than 3 hours, no need for delta   1858 pH, Issac(!): 7.467  Not acidotic          Critical Care Time  Procedures

## 2024-05-31 NOTE — PROGRESS NOTES
Chief Complaint   Patient presents with   • Weight Check     Pt states he is losing weight lost 30lb over past 2-3 weeks   • Fatigue     weakness        Patient ID: Jean Guerrero is a 38 y.o. male.    HPI  Pt is seeing for 30 lb weight loss in 2-3 wks, increased thirst and urination, feeling tired -  h/o preDM for a few years-   no recent labs     The following portions of the patient's history were reviewed and updated as appropriate: allergies, current medications, past family history, past medical history, past social history, past surgical history and problem list.    Review of Systems   Constitutional:  Positive for appetite change, fatigue and unexpected weight change.   HENT: Negative.     Respiratory: Negative.     Cardiovascular: Negative.    Gastrointestinal: Negative.    Endocrine: Positive for polydipsia and polyuria. Negative for cold intolerance and heat intolerance.   Genitourinary:  Positive for frequency. Negative for difficulty urinating.   Musculoskeletal:  Positive for myalgias.   Skin:  Negative for color change, rash and wound.   Neurological: Negative.    Psychiatric/Behavioral: Negative.         Current Outpatient Medications   Medication Sig Dispense Refill   • albuterol (Proventil HFA) 90 mcg/act inhaler Inhale 2 puffs every 6 (six) hours as needed for wheezing 6.7 g 0   • famotidine (PEPCID) 20 mg tablet Take 20 mg by mouth daily     • Acetaminophen 160 MG TBDP Take 160 mg by mouth if needed for mild pain (Patient not taking: Reported on 1/5/2024)     • Ascorbic Acid (vitamin C) 1000 MG tablet Take 1,000 mg by mouth daily Stopped taking but will resume (Patient not taking: Reported on 1/5/2024)     • aspirin (ECOTRIN LOW STRENGTH) 81 mg EC tablet Take 81 mg by mouth daily (Patient not taking: Reported on 1/5/2024)     • cholecalciferol (VITAMIN D3) 1,000 units tablet Take 1,000 Units by mouth daily Stopped taking but will resume (Patient not taking: Reported on 1/5/2024)     • loratadine  "(CLARITIN) 10 mg tablet Take 10 mg by mouth daily (Patient not taking: Reported on 5/17/2024)     • Multiple Vitamins-Minerals (Mens 50+ Advanced) CAPS Take by mouth Stopped taking but will resume (Patient not taking: Reported on 1/5/2024)     • nicotine (NICODERM CQ) 21 mg/24 hr TD 24 hr patch Place 1 patch on the skin every 24 hours (Patient not taking: Reported on 10/20/2022) 28 patch 0   • Omega-3 Fatty Acids (fish oil) 1,000 mg Take 1 capsule (1,000 mg total) by mouth 2 (two) times a day (Patient not taking: Reported on 1/5/2024) 60 capsule 3   • Red Yeast Rice Extract 600 MG CAPS Take 1200mg daily (Patient not taking: Reported on 5/17/2024) 180 capsule 1   • Turmeric 400 MG CAPS Take by mouth Stopped taking but will resume (Patient not taking: Reported on 1/5/2024)       No current facility-administered medications for this visit.       Objective:    /72 (BP Location: Left arm, Patient Position: Sitting, Cuff Size: Adult)   Pulse 72   Temp 98.7 °F (37.1 °C) (Temporal)   Resp 14   Ht 5' 9\" (1.753 m)   Wt 83.9 kg (185 lb)   BMI 27.32 kg/m²        Physical Exam  Constitutional:       General: He is not in acute distress.     Appearance: He is not ill-appearing.   Cardiovascular:      Rate and Rhythm: Normal rate and regular rhythm.   Pulmonary:      Effort: Pulmonary effort is normal. No respiratory distress.   Musculoskeletal:      Right lower leg: No edema.      Left lower leg: No edema.   Neurological:      General: No focal deficit present.      Mental Status: He is alert and oriented to person, place, and time.                 Assessment/Plan:         Diagnoses and all orders for this visit:    Weight loss  -     Transfer to other facility    Type 2 diabetes mellitus with hyperglycemia, without long-term current use of insulin (HCC) -  new onset   -     Transfer to other facility      Fingerstick attempted -  register Hi  -  over 600 likely    Rto after ER izzy Brito " MD Nissa

## 2024-05-31 NOTE — TELEPHONE ENCOUNTER
Patient wife, Tita is calling to see if Jean can be seen as soon as possible. I was able to reschedule original appointment to soonest available for Monday 6/3.  She states that Jean is extremely fatigued and weak and has lost lost 30 lbs in 3 weeks.     Attempt to reach office for availability today - line busy. Patient was scheduled for an OVL.     Please advise if any providers would be willing to see Jean today and call Tita back at [phone]    Thank you!

## 2024-05-31 NOTE — TELEPHONE ENCOUNTER
Tried to contact Tita to change appointment to today's schedule.  Please transfer to office for overbook

## 2024-06-01 LAB
ALBUMIN SERPL BCP-MCNC: 3.2 G/DL (ref 3.5–5)
ALP SERPL-CCNC: 66 U/L (ref 34–104)
ALT SERPL W P-5'-P-CCNC: 13 U/L (ref 7–52)
ANION GAP SERPL CALCULATED.3IONS-SCNC: 6 MMOL/L (ref 4–13)
AST SERPL W P-5'-P-CCNC: 12 U/L (ref 13–39)
BILIRUB SERPL-MCNC: 0.27 MG/DL (ref 0.2–1)
BUN SERPL-MCNC: 13 MG/DL (ref 5–25)
CALCIUM ALBUM COR SERPL-MCNC: 8.4 MG/DL (ref 8.3–10.1)
CALCIUM SERPL-MCNC: 7.8 MG/DL (ref 8.4–10.2)
CHLORIDE SERPL-SCNC: 106 MMOL/L (ref 96–108)
CO2 SERPL-SCNC: 25 MMOL/L (ref 21–32)
CREAT SERPL-MCNC: 0.83 MG/DL (ref 0.6–1.3)
ERYTHROCYTE [DISTWIDTH] IN BLOOD BY AUTOMATED COUNT: 12.1 % (ref 11.6–15.1)
EST. AVERAGE GLUCOSE BLD GHB EST-MCNC: 369 MG/DL
GFR SERPL CREATININE-BSD FRML MDRD: 113 ML/MIN/1.73SQ M
GLUCOSE SERPL-MCNC: 108 MG/DL (ref 65–140)
GLUCOSE SERPL-MCNC: 119 MG/DL (ref 65–140)
GLUCOSE SERPL-MCNC: 147 MG/DL (ref 65–140)
GLUCOSE SERPL-MCNC: 149 MG/DL (ref 65–140)
GLUCOSE SERPL-MCNC: 203 MG/DL (ref 65–140)
GLUCOSE SERPL-MCNC: 229 MG/DL (ref 65–140)
GLUCOSE SERPL-MCNC: 364 MG/DL (ref 65–140)
HBA1C MFR BLD: 14.5 %
HCT VFR BLD AUTO: 39.2 % (ref 36.5–49.3)
HGB BLD-MCNC: 12.9 G/DL (ref 12–17)
MAGNESIUM SERPL-MCNC: 1.7 MG/DL (ref 1.9–2.7)
MCH RBC QN AUTO: 27.7 PG (ref 26.8–34.3)
MCHC RBC AUTO-ENTMCNC: 32.9 G/DL (ref 31.4–37.4)
MCV RBC AUTO: 84 FL (ref 82–98)
PLATELET # BLD AUTO: 246 THOUSANDS/UL (ref 149–390)
PMV BLD AUTO: 12.1 FL (ref 8.9–12.7)
POTASSIUM SERPL-SCNC: 3.7 MMOL/L (ref 3.5–5.3)
PROT SERPL-MCNC: 5.7 G/DL (ref 6.4–8.4)
RBC # BLD AUTO: 4.66 MILLION/UL (ref 3.88–5.62)
SODIUM SERPL-SCNC: 137 MMOL/L (ref 135–147)
WBC # BLD AUTO: 9.12 THOUSAND/UL (ref 4.31–10.16)

## 2024-06-01 PROCEDURE — 94660 CPAP INITIATION&MGMT: CPT

## 2024-06-01 PROCEDURE — 99223 1ST HOSP IP/OBS HIGH 75: CPT | Performed by: INTERNAL MEDICINE

## 2024-06-01 PROCEDURE — 99232 SBSQ HOSP IP/OBS MODERATE 35: CPT | Performed by: INTERNAL MEDICINE

## 2024-06-01 PROCEDURE — 80053 COMPREHEN METABOLIC PANEL: CPT

## 2024-06-01 PROCEDURE — 82948 REAGENT STRIP/BLOOD GLUCOSE: CPT

## 2024-06-01 PROCEDURE — 94760 N-INVAS EAR/PLS OXIMETRY 1: CPT

## 2024-06-01 PROCEDURE — 85027 COMPLETE CBC AUTOMATED: CPT

## 2024-06-01 PROCEDURE — 83735 ASSAY OF MAGNESIUM: CPT

## 2024-06-01 RX ORDER — INSULIN LISPRO 100 [IU]/ML
5 INJECTION, SOLUTION INTRAVENOUS; SUBCUTANEOUS
Status: DISCONTINUED | OUTPATIENT
Start: 2024-06-01 | End: 2024-06-03 | Stop reason: HOSPADM

## 2024-06-01 RX ORDER — INSULIN DEGLUDEC 100 U/ML
15 INJECTION, SOLUTION SUBCUTANEOUS
Qty: 15 ML | Refills: 0 | Status: SHIPPED | OUTPATIENT
Start: 2024-06-01

## 2024-06-01 RX ORDER — ATORVASTATIN CALCIUM 20 MG/1
20 TABLET, FILM COATED ORAL
Status: DISCONTINUED | OUTPATIENT
Start: 2024-06-01 | End: 2024-06-03 | Stop reason: HOSPADM

## 2024-06-01 RX ORDER — BLOOD SUGAR DIAGNOSTIC
STRIP MISCELLANEOUS
Qty: 100 EACH | Refills: 0 | Status: SHIPPED | OUTPATIENT
Start: 2024-06-01

## 2024-06-01 RX ORDER — PEN NEEDLE, DIABETIC 32GX 5/32"
NEEDLE, DISPOSABLE MISCELLANEOUS
Qty: 100 EACH | Refills: 0 | Status: SHIPPED | OUTPATIENT
Start: 2024-06-01

## 2024-06-01 RX ORDER — INSULIN LISPRO 100 [IU]/ML
1-6 INJECTION, SOLUTION INTRAVENOUS; SUBCUTANEOUS
Status: DISCONTINUED | OUTPATIENT
Start: 2024-06-01 | End: 2024-06-03 | Stop reason: HOSPADM

## 2024-06-01 RX ORDER — LANCETS 33 GAUGE
EACH MISCELLANEOUS
Qty: 100 EACH | Refills: 0 | Status: SHIPPED | OUTPATIENT
Start: 2024-06-01

## 2024-06-01 RX ORDER — INSULIN LISPRO 100 [IU]/ML
5 INJECTION, SOLUTION INTRAVENOUS; SUBCUTANEOUS
Qty: 15 ML | Refills: 0 | Status: SHIPPED | OUTPATIENT
Start: 2024-06-01 | End: 2024-06-03

## 2024-06-01 RX ORDER — GLUCOSAMINE HCL/CHONDROITIN SU 500-400 MG
CAPSULE ORAL
Qty: 100 EACH | Refills: 0 | Status: SHIPPED | OUTPATIENT
Start: 2024-06-01

## 2024-06-01 RX ORDER — INSULIN GLARGINE 100 [IU]/ML
15 INJECTION, SOLUTION SUBCUTANEOUS
Status: DISCONTINUED | OUTPATIENT
Start: 2024-06-02 | End: 2024-06-03 | Stop reason: HOSPADM

## 2024-06-01 RX ORDER — INSULIN GLARGINE 100 [IU]/ML
10 INJECTION, SOLUTION SUBCUTANEOUS ONCE
Status: COMPLETED | OUTPATIENT
Start: 2024-06-01 | End: 2024-06-01

## 2024-06-01 RX ORDER — BLOOD-GLUCOSE METER
KIT MISCELLANEOUS
Qty: 1 KIT | Refills: 0 | Status: SHIPPED | OUTPATIENT
Start: 2024-06-01

## 2024-06-01 RX ORDER — MAGNESIUM SULFATE HEPTAHYDRATE 40 MG/ML
2 INJECTION, SOLUTION INTRAVENOUS ONCE
Status: COMPLETED | OUTPATIENT
Start: 2024-06-01 | End: 2024-06-01

## 2024-06-01 RX ORDER — INSULIN GLARGINE 100 [IU]/ML
5 INJECTION, SOLUTION SUBCUTANEOUS ONCE
Status: COMPLETED | OUTPATIENT
Start: 2024-06-01 | End: 2024-06-01

## 2024-06-01 RX ADMIN — INSULIN GLARGINE 10 UNITS: 100 INJECTION, SOLUTION SUBCUTANEOUS at 04:28

## 2024-06-01 RX ADMIN — MAGNESIUM SULFATE HEPTAHYDRATE 2 G: 40 INJECTION, SOLUTION INTRAVENOUS at 06:14

## 2024-06-01 RX ADMIN — INSULIN LISPRO 5 UNITS: 100 INJECTION, SOLUTION INTRAVENOUS; SUBCUTANEOUS at 12:03

## 2024-06-01 RX ADMIN — INSULIN LISPRO 2 UNITS: 100 INJECTION, SOLUTION INTRAVENOUS; SUBCUTANEOUS at 09:05

## 2024-06-01 RX ADMIN — FAMOTIDINE 20 MG: 20 TABLET ORAL at 09:05

## 2024-06-01 RX ADMIN — ATORVASTATIN CALCIUM 20 MG: 20 TABLET, FILM COATED ORAL at 16:40

## 2024-06-01 RX ADMIN — INSULIN LISPRO 5 UNITS: 100 INJECTION, SOLUTION INTRAVENOUS; SUBCUTANEOUS at 16:26

## 2024-06-01 RX ADMIN — INSULIN LISPRO 2 UNITS: 100 INJECTION, SOLUTION INTRAVENOUS; SUBCUTANEOUS at 16:27

## 2024-06-01 RX ADMIN — INSULIN GLARGINE 5 UNITS: 100 INJECTION, SOLUTION SUBCUTANEOUS at 11:14

## 2024-06-01 NOTE — DISCHARGE INSTR - DIET
Reach out to central scheduling to make an appointment for medical nutrition therapy as discussed when this writer (the registered dietitian) met with you during your admission.  A referral for nutrition services should be put into your chart before discharge but if it is not, please reach out to your provider to obtain a referral as one is needed before meeting with the dietitian.     Key take away's:  *Carbohydrates: Grains (rice, pasta, cereal, crackers, bread), sugary drinks, fruit, starchy vegetables (potatoes, sweet potatoes, peas, beans, corn), sweets, candy, desserts  *Review nutrition information for foods from fast food restaurants you commonly go to during the work day  *Eating every 3-4 hours vs 1 large meal/day  *Eating carbohydrates paired with protein  *Adequate fiber: 25 grams per day  *<8oz of a sugary drink per day  *Estimated carbohydrates per day: 180-200 grams or 12-14 carbohydrate choices per day (see below regarding carbohydrate choices). This comes to about 4 carbohydrate choices for each of your 3 meals/day.               Joleen Mcdowell RD, LDN  Clinical Dietitian  Nutrition Services  [address]  [address] [phone] office  [phone] teams  [email or web address]  Available on [email or web address]  [email or web address]

## 2024-06-01 NOTE — UTILIZATION REVIEW
Initial Clinical Review    Admission: Date/Time/Statement:   Admission Orders (From admission, onward)       Ordered        05/31/24 2057  INPATIENT ADMISSION  Once                          Orders Placed This Encounter   Procedures    INPATIENT ADMISSION     Standing Status:   Standing     Number of Occurrences:   1     Order Specific Question:   Level of Care     Answer:   Med Surg [16]     Order Specific Question:   Estimated length of stay     Answer:   More than 2 Midnights     Order Specific Question:   Certification     Answer:   I certify that inpatient services are medically necessary for this patient for a duration of greater than two midnights. See H&P and MD Progress Notes for additional information about the patient's course of treatment.     ED Arrival Information       Expected   -    Arrival   5/31/2024 16:31    Acuity   Emergent              Means of arrival   Walk-In    Escorted by   Spouse    Service   Hospitalist    Admission type   Emergency              Arrival complaint   Hyperglycemia >600             Chief Complaint   Patient presents with    Hyperglycemia - Symptomatic     Pt seen at PCP today for 30lb weight loss and frequent urination x2 mo, BG >600 at office. Increased thirst, dry mouth.       Initial Presentation: 38 y.o. male  to ED, per PCP,  via walk in from home.    Admitted to inpatient with Dx: New Onset Diabetes/hypertension/hypertriglyceridemia.  Presented to ED with  new onset diabetes,   for last 1.5 to 2 months with worsening polyuria and polydipsia, and over last 3 weeks lost 30 lbs.  Chest discomfort.  Pain RUQ. PMHx: HTN, asthma, pre-diabetes. On exam: no focal deficits.  Hemoglobin A1c 14.5.   UA 1% glucose.   pH 7.467.  PCO2 19.1.   HCO3 13.5.  phos 2.3.   glucose 590.  Na 129.  ED treatment:  1 liter IVF bolus and started on insulin drip.    Plan includes continue insulin drip.   Glucose checks and correction.   BMP every 4 hours and replete electrolytes.   IVF.    Consult endocrine.  Monitor BP and possible start of lisinopril.       Anticipated Length of Stay/Certification Statement:   Patient will be admitted on an inpatient basis with an anticipated length of stay of greater than 2 midnights secondary to hyperglycemia with new onset DM .     Date: 6/1/24   Day 2:  has mild abdominal pain.   On exam: no focal deficits.   Glucose low of 119, max of 464.  Transitioned from non DKA insulin drip to Lantus 15 at bedtime and Humalog 5 units 3 times daily with insulin sliding scale.  Goal -180.   Diabetic and insulin teaching.  Start atorvastatin.      6/3- oral intake only fair.  Diabetic teaching in progress.   Endocrine consult is pending    ED Triage Vitals   Temperature Pulse Respirations Blood Pressure SpO2   05/31/24 1649 05/31/24 1649 05/31/24 1649 05/31/24 1649 05/31/24 1649   98 °F (36.7 °C) 87 20 133/79 98 %      Temp Source Heart Rate Source Patient Position - Orthostatic VS BP Location FiO2 (%)   05/31/24 1649 05/31/24 2021 05/31/24 1900 05/31/24 1900 --   Oral Monitor Sitting Right arm       Pain Score       05/31/24 1649       No Pain          Wt Readings from Last 1 Encounters:   05/31/24 84.6 kg (186 lb 8.2 oz)     Additional Vital Signs:   06/01/24 07:26:08 97.6 °F (36.4 °C) 65 14 105/64 78 97 % -- -- --   06/01/24 0518 -- -- -- -- -- 96 % None (Room air) Face mask --   05/31/24 23:45:40 98.1 °F (36.7 °C) 90 -- 106/78 87 96 % -- -- --   05/31/24 2338 -- -- -- -- -- 97 % None (Room air) Face mask --   05/31/24 22:30:03 98.3 °F (36.8 °C) 66 -- 137/81 100 100 % -- -- --   05/31/24 2216 -- 80 18 123/65 88 97 % None (Room air) -- Lying   05/31/24 2021 -- 71 18 135/75 96 98 % None (Room air) -- Lying   05/31/24 1900 -- 61 18 135/91 107 98 % None (Room air) --      Pertinent Labs/Diagnostic Test Results:   CT abdomen pelvis with contrast   Final Result by Vu Falcon MD (05/31 1905)      No acute abnormality in the abdomen or pelvis.         Workstation  performed: [id number]         XR chest portable   ED Interpretation by Hernan Ellis MD (05/31 1848)   Per my independent interpretation: No acute cardiopulmonary process      Final Result by Marine Venegas MD (05/31 2055)      No acute cardiopulmonary disease.            Workstation performed: [id number]             Results from last 7 days   Lab Units 06/01/24  0438 05/31/24  1656   WBC Thousand/uL 9.12 5.28   HEMOGLOBIN g/dL 12.9 15.3   HEMATOCRIT % 39.2 46.4   PLATELETS Thousands/uL 246 248   TOTAL NEUT ABS Thousands/µL  --  2.40     Results from last 7 days   Lab Units 06/02/24  0537 06/01/24  0438 05/31/24 2155 05/31/24  1755 05/31/24  1656   SODIUM mmol/L 136 137 132*  --  129*   POTASSIUM mmol/L 3.7 3.7 4.0  --  4.3   CHLORIDE mmol/L 105 106 101  --  96   CO2 mmol/L 27 25 26  --  24   ANION GAP mmol/L 4 6 5  --  9   BUN mg/dL 16 13 8  --  9   CREATININE mg/dL 0.84 0.83 0.93  --  1.14   EGFR ml/min/1.73sq m 112 113 105  --  82   CALCIUM mg/dL 8.1* 7.8* 8.2*  --  8.8   MAGNESIUM mg/dL  --  1.7*  --  1.9  --    PHOSPHORUS mg/dL  --   --   --  2.3*  --      Results from last 7 days   Lab Units 06/02/24  0537 06/01/24 0438 05/31/24  1656   AST U/L 14 12* 15   ALT U/L 14 13 18   ALK PHOS U/L 61 66 81   TOTAL PROTEIN g/dL 5.8* 5.7* 7.1   ALBUMIN g/dL 3.2* 3.2* 4.0   TOTAL BILIRUBIN mg/dL 0.58 0.27 0.68     Results from last 7 days   Lab Units 06/02/24  0736 06/01/24 2156 06/01/24  1613 06/01/24  1042 06/01/24  0725 06/01/24  0418 06/01/24  0149 05/31/24  2340 05/31/24 2129 05/31/24  1655   POC GLUCOSE mg/dl 189* 108 229* 364* 203* 149* 119 241* 434* 580*     Results from last 7 days   Lab Units 06/02/24  0537 06/01/24 0438 05/31/24 2155 05/31/24  1656   GLUCOSE RANDOM mg/dL 196* 147* 552* 590*     Results from last 7 days   Lab Units 05/31/24  1755   HEMOGLOBIN A1C % 14.5*   EAG mg/dl 369     Beta- Hydroxybutyrate   Date Value Ref Range Status   05/31/2024 0.07 0.02 - 0.27 mmol/L Final       Results from last 7 days   Lab Units 05/31/24  1841   PH CARLITOS  7.467*   PCO2 CARLITOS mm Hg 19.1*   PO2 CARLITOS mm Hg 147.0*   HCO3 CARLITOS mmol/L 13.5*   BASE EXC CARLITOS mmol/L -8.1   O2 CONTENT CARLITOS ml/dL 15.2   O2 HGB, VENOUS % 92.4*     Results from last 7 days   Lab Units 05/31/24  1755   HS TNI 0HR ng/L <2     Results from last 7 days   Lab Units 05/31/24  1755   TSH 3RD GENERATON uIU/mL 0.930     Results from last 7 days   Lab Units 05/31/24  1656   LIPASE u/L 94*     Results from last 7 days   Lab Units 05/31/24  1932   CLARITY UA  Clear   COLOR UA  Colorless   SPEC GRAV UA  >=1.050*   PH UA  6.5   GLUCOSE UA mg/dl >=1000 (1%)*   KETONES UA mg/dl Negative   BLOOD UA  Negative   PROTEIN UA mg/dl Negative   NITRITE UA  Negative   BILIRUBIN UA  Negative   UROBILINOGEN UA (BE) mg/dl <2.0   LEUKOCYTES UA  Elevated glucose may cause decreased leukocyte values. See urine microscopic for UWBC result*   WBC UA /hpf None Seen   RBC UA /hpf None Seen   BACTERIA UA /hpf None Seen   EPITHELIAL CELLS WET PREP /hpf None Seen         ED Treatment:   Medication Administration from 05/31/2024 1630 to 05/31/2024 2227         Date/Time Order Dose Route Action Comments     05/31/2024 1759 EDT sodium chloride 0.9 % bolus 1,000 mL 1,000 mL Intravenous New Bag --     05/31/2024 1848 EDT iohexol (OMNIPAQUE) 350 MG/ML injection (MULTI-DOSE) 100 mL 100 mL Intravenous Given --     05/31/2024 2159 EDT sodium chloride 0.9 % with KCl 20 mEq/L infusion (premix) 125 mL/hr Intravenous New Bag --     05/31/2024 2200 EDT insulin regular (HumuLIN R,NovoLIN R) 1 Units/mL in sodium chloride 0.9 % 100 mL infusion 12 Units/hr Intravenous New Bag --          Past Medical History:   Diagnosis Date    Asthma 2005    Bloody stool     Diverticulitis of colon     GERD (gastroesophageal reflux disease)     Headaches, cluster     Hypertension     borderline high, not on meds currently.    Irregular bowel habits     Irregular heart beat     palpitations, unusual chest  discomfort. Dr Cheung recommended    Stomach pain      Present on Admission:   HTN (hypertension)   JOAQUIN (obstructive sleep apnea)   Diabetes (HCC)   Tobacco dependence   Asthma   Hypertriglyceridemia      Admitting Diagnosis: Diabetes (HCC) [E11.9]  Elevated blood pressure reading [R03.0]  Hyperglycemia [R73.9]  Acute chest pain [R07.9]  Diabetes mellitus, new onset (HCC) [E11.9]  Age/Sex: 38 y.o. male  Admission Orders:  Scheduled Medications:  atorvastatin, 20 mg, Oral, Daily With Dinner  famotidine, 20 mg, Oral, Daily  insulin glargine, 15 Units, Subcutaneous, HS  insulin lispro, 1-6 Units, Subcutaneous, 4x Daily (AC & HS)  insulin lispro, 5 Units, Subcutaneous, TID With Meals  loratadine, 10 mg, Oral, Daily    insulin glargine (LANTUS) subcutaneous injection 10 Units 0.1 mL  Dose: 10 Units  Freq: Once Route: SC  Start: 06/01/24 0345 End: 06/01/24 0428  insulin glargine (LANTUS) subcutaneous injection 5 Units 0.05 mL  Dose: 5 Units  Freq: Once Route: SC  Start: 06/01/24 1045 End: 06/01/24 1114    magnesium sulfate 2 g/50 mL IVPB (premix) 2 g  Dose: 2 g  Freq: Once Route: IV  Last Dose: Stopped (06/01/24 0901)  Start: 06/01/24 0600 End: 06/01/24 0901    Continuous IV Infusions:  insulin regular (HumuLIN R,NovoLIN R) 1 Units/mL in sodium chloride 0.9 % 100 mL infusion  Rate: 0.3-21 mL/hr Dose: 0.3-21 Units/hr  Freq: Titrated Route: IV  Last Dose: Stopped (06/01/24 0424)  Start: 05/31/24 2100 End: 06/01/24 0341     sodium chloride 0.9 % with KCl 20 mEq/L infusion (premix)  Rate: 125 mL/hr Dose: 125 mL/hr  Freq: Continuous Route: IV  Last Dose: Stopped (06/01/24 0422)  Start: 05/31/24 2100 End: 06/01/24 0344        PRN Meds: not used   acetaminophen, 975 mg, Oral, Q8H PRN  albuterol, 2 puff, Inhalation, Q6H PRN  calcium carbonate, 500 mg, Oral, TID PRN  melatonin, 3 mg, Oral, HS PRN    Cpap at bedtime    IP CONSULT TO ENDOCRINOLOGY    Network Utilization Review Department  ATTENTION: Please call with any questions or  concerns to [phone] and carefully listen to the prompts so that you are directed to the right person. All voicemails are confidential.   For Discharge needs, contact Care Management DC Support Team at [phone] opt. 2  Send all requests for admission clinical reviews, approved or denied determinations and any other requests to dedicated fax number below belonging to the [place] where the patient is receiving treatment. List of dedicated fax numbers for the Facilities:  FACILITY NAME UR FAX NUMBER   ADMISSION DENIALS (Administrative/Medical Necessity) [phone]   DISCHARGE SUPPORT TEAM (NETWORK) [phone]   PARENT CHILD HEALTH (Maternity/NICU/Pediatrics) [phone]   [facility] [phone]   [facility] [phone]   [facility] [phone]   [facility] [phone]   [facility] [phone]   [facility] [phone]   [facility] [phone]   [facility] [phone]   [facility] [phone]   [facility] [phone]   [facility] [phone]   [facility] [phone]

## 2024-06-01 NOTE — H&P
[facility]  H&P  Name: Jean Guerrero 38 y.o. male I MRN: [id number]  Unit/Bed#: W -01 I Date of Admission: 5/31/2024   Date of Service: 5/31/2024 I Hospital Day: 0      Assessment & Plan   * Diabetes (HCC)  Assessment & Plan    Lab Results   Component Value Date    HGBA1C 5.8 (H) 09/30/2021       Recent Labs     05/31/24  1655 05/31/24 2129   POCGLU 580* 434*     Pt w/ hx of pre-diabetes and family hx of diabetes presents with 3 weeks of polydipsia, polyuria, and weight loss   Blood glu on arrival 580   Pt not on DM medications PTA     Plan:   Hgb A1c   Diabetic diet  Insulin gtt (non-DKA) until blood glu < 200   Transition to subq once glu < 200   Glucose checks and Insulin correction ACHS  Goal -180 while admitted, adjusting insulin regimen as appropriate  /hr w/ 20 K   Q4h BMP   Monitor for hypoglycemia and treat per protocol  DM Ab testing   Thyroid studies   Lipid panel   Endocrinology consulted     HTN (hypertension)  Assessment & Plan  Blood Pressure: 137/81    Patient not on any anti-HTN medication PTA     Plan:  Monitor blood pressure  Consider initiating lisinopril 5 mg daily   PRN IV Labetalol and Hydralazine for SBP > 160      JOAQUIN (obstructive sleep apnea)  Assessment & Plan  CPAP qhs    Hypertriglyceridemia  Assessment & Plan  Lab Results   Component Value Date    CHOLESTEROL 181 05/31/2024    TRIG 209 (H) 05/31/2024    HDL 36 (L) 05/31/2024    LDLCALC 103 (H) 05/31/2024     Lab Results   Component Value Date    LIPASE 94 (H) 05/31/2024    LIPASE 94 04/18/2022     Patient with elevated triglycerides and slightly elevated lipase.   Patient denies significant alcohol use and reports cutting back significantly on his fast food intake.   Denies any abdominal pain, N/V.   CT abdomen pelvis visualizes pancreas unremarkable.     Plan:   Recommend incorporating a more whole foods plant-predominant diet along with decreasing consumption of red meats and processed  foods  Per AHA guidelines, recommend moderate-vigorous intensity exercise for 30 minutes a day for 5 days a week or a total of 150 min/week    Asthma  Assessment & Plan  PMH of asthma, with albuterol inhaler PRN.   No wheezing on exam at time of admission.     Plan:   - Claritin   - Albuterol inhaler PRN    Tobacco dependence  Assessment & Plan  Patient uses vape/e-cigarrette daily   Declines nicotine replacement therapy on admission     Plan:   - Tobacco cessation at discharge          VTE Pharmacologic Prophylaxis: VTE Score: 1 Low Risk (Score 0-2) - Encourage Ambulation.  Code Status: Level 1 - Full Code   Discussion with family: Updated  (wife) at bedside.    Anticipated Length of Stay: Patient will be admitted on an inpatient basis with an anticipated length of stay of greater than 2 midnights secondary to hyperglycemia with new onset DM .    Total Time Spent on Date of Encounter in care of patient: 60 mins. This time was spent on one or more of the following: performing physical exam; counseling and coordination of care; obtaining or reviewing history; documenting in the medical record; reviewing/ordering tests, medications or procedures; communicating with other healthcare professionals and discussing with patient's family/caregivers.    Chief Complaint: weight loss, polydipsia, polyuria     History of Present Illness:  Jean Guerrero is a 38 y.o. male with a PMH of HTN, asthma, pre-diabetes who presents with 3 weeks of polyuria, polydipsia, and weight loss.     Patient reports current symptoms were precipitated by an episode of bronchitis, which was treated with abx and steroids. He realized something was wrong when he had lost 30 lbs over the course of 3 weeks, and the weight wasn't returning after he had recovered from the bronchitis. He also endorses he has been extremely thirsty with frequent urination and feeling persistently dehydrated. He also endorses body soreness and weakness. He  "denies any chest pain, SOB, abdominal pain, N/V/D/C. He reports stool has been \"dark green,\" but denies the presence of any blood or black, tarry stool. He's had a chronic cough since he had bronchitis, with is intermittently productive of light brownish sputum.     Patient works on the road as a  and so usually packs a sandwhich for lunch, but has been eating more fruit lately. He denies alcohol use and endorses vaping nicotine daily.     He follows with Cardiology for HTN, but does not take anti-HTN medication     Review of Systems:  Review of Systems   Constitutional:  Positive for unexpected weight change. Negative for chills and fever.   HENT:  Negative for congestion and rhinorrhea.    Eyes:  Negative for discharge and redness.   Respiratory:  Negative for shortness of breath.    Cardiovascular:  Negative for chest pain.   Gastrointestinal:  Negative for abdominal pain, constipation, diarrhea, nausea and vomiting.   Endocrine: Positive for polydipsia and polyuria.   Genitourinary:  Negative for difficulty urinating, dysuria and hematuria.   Musculoskeletal:  Positive for myalgias.   Skin:  Negative for color change and rash.   Allergic/Immunologic: Positive for environmental allergies.   Neurological:  Positive for headaches. Negative for dizziness and light-headedness.   Psychiatric/Behavioral:  Negative for sleep disturbance.        Past Medical and Surgical History:   Past Medical History:   Diagnosis Date    Asthma 2005    Bloody stool     Diverticulitis of colon     GERD (gastroesophageal reflux disease)     Headaches, cluster     Hypertension     borderline high, not on meds currently.    Irregular bowel habits     Irregular heart beat     palpitations, unusual chest discomfort. Dr Cheung recommended    Stomach pain        Past Surgical History:   Procedure Laterality Date    COLONOSCOPY      WISDOM TOOTH EXTRACTION         Meds/Allergies:  Prior to Admission medications    Medication Sig " Start Date End Date Taking? Authorizing Provider   Acetaminophen 160 MG TBDP Take 160 mg by mouth if needed for mild pain  Patient not taking: Reported on 1/5/2024    Historical Provider, MD   albuterol (Proventil HFA) 90 mcg/act inhaler Inhale 2 puffs every 6 (six) hours as needed for wheezing 5/17/24   ALYSSA [place]   Ascorbic Acid (vitamin C) 1000 MG tablet Take 1,000 mg by mouth daily Stopped taking but will resume  Patient not taking: Reported on 1/5/2024    Historical Provider, MD   aspirin (ECOTRIN LOW STRENGTH) 81 mg EC tablet Take 81 mg by mouth daily  Patient not taking: Reported on 1/5/2024    Historical Provider, MD   cholecalciferol (VITAMIN D3) 1,000 units tablet Take 1,000 Units by mouth daily Stopped taking but will resume  Patient not taking: Reported on 1/5/2024    Historical Provider, MD   famotidine (PEPCID) 20 mg tablet Take 20 mg by mouth daily    Historical Provider, MD   loratadine (CLARITIN) 10 mg tablet Take 10 mg by mouth daily  Patient not taking: Reported on 5/17/2024    Historical Provider, MD   Multiple Vitamins-Minerals (Mens 50+ Advanced) CAPS Take by mouth Stopped taking but will resume  Patient not taking: Reported on 1/5/2024    Historical Provider, MD   nicotine (NICODERM CQ) 21 mg/24 hr TD 24 hr patch Place 1 patch on the skin every 24 hours  Patient not taking: Reported on 10/20/2022 6/30/22   Efren Garcia MD   Omega-3 Fatty Acids (fish oil) 1,000 mg Take 1 capsule (1,000 mg total) by mouth 2 (two) times a day  Patient not taking: Reported on 1/5/2024 5/26/22   Edgar Cheung MD   Red Yeast Rice Extract 600 MG CAPS Take 1200mg daily  Patient not taking: Reported on 5/17/2024 1/5/24   Edgar Cheung MD   Turmeric 400 MG CAPS Take by mouth Stopped taking but will resume  Patient not taking: Reported on 1/5/2024    Historical Provider, MD     I have reviewed home medications with patient personally.    Allergies:   Allergies   Allergen Reactions    Bee Venom Anaphylaxis      Reaction Date: 30Jan2006;        Social History:  Marital Status: /Civil Union   Occupation:    Patient Pre-hospital Living Situation: Home with wife and 2 kids, 2 sets of stairs   Patient Pre-hospital Level of Mobility: walks  Patient Pre-hospital Diet Restrictions: none   Substance Use History:   Social History     Substance and Sexual Activity   Alcohol Use Yes    Comment: social     Social History     Tobacco Use   Smoking Status Former    Current packs/day: 0.50    Average packs/day: 0.5 packs/day for 15.0 years (7.5 ttl pk-yrs)    Types: Cigarettes   Smokeless Tobacco Never     Social History     Substance and Sexual Activity   Drug Use Never       Family History:  Family History   Problem Relation Age of Onset    Diabetes type II Mother     Graves' disease Mother     Fibromyalgia Mother     Migraines Mother     Other Mother         Spinal stenosis    Transient ischemic attack Mother     Asthma Mother     Hypertension Mother     Rheum arthritis Sister     Asthma Brother     Hypertension Maternal Grandmother     Colorectal Cancer Maternal Grandmother     Kidney disease Maternal Grandfather     Stroke Maternal Grandfather     Heart attack Maternal Grandfather     Diabetes type II Maternal Grandfather     Diabetes type II Maternal Aunt     Hypertension Maternal Uncle     Diabetes Maternal Uncle        Physical Exam:     Vitals:   Blood Pressure: 137/81 (05/31/24 2230)  Pulse: 66 (05/31/24 2230)  Temperature: 98.3 °F (36.8 °C) (05/31/24 2230)  Temp Source: Oral (05/31/24 1649)  Respirations: 18 (05/31/24 2216)  Weight - Scale: 84.6 kg (186 lb 8.2 oz) (05/31/24 1647)  SpO2: 100 % (05/31/24 2230)    Physical Exam  Vitals and nursing note reviewed.   Constitutional:       General: He is not in acute distress.     Appearance: He is not toxic-appearing or diaphoretic.   HENT:      Head: Normocephalic and atraumatic.      Nose: Nose normal. No congestion or rhinorrhea.      Mouth/Throat:       Mouth: Mucous membranes are moist.   Eyes:      General: No scleral icterus.        Right eye: No discharge.         Left eye: No discharge.      Conjunctiva/sclera: Conjunctivae normal.   Cardiovascular:      Rate and Rhythm: Normal rate and regular rhythm.      Heart sounds: Normal heart sounds. No murmur heard.     No friction rub. No gallop.   Pulmonary:      Effort: Pulmonary effort is normal. No respiratory distress.      Breath sounds: Normal breath sounds. No wheezing, rhonchi or rales.   Abdominal:      General: Abdomen is flat. Bowel sounds are normal. There is no distension.      Palpations: Abdomen is soft.      Tenderness: There is no abdominal tenderness. There is no guarding.   Musculoskeletal:      Cervical back: Neck supple. No tenderness.      Right lower leg: No edema.      Left lower leg: No edema.   Lymphadenopathy:      Cervical: No cervical adenopathy.   Skin:     General: Skin is warm and dry.      Capillary Refill: Capillary refill takes less than 2 seconds.      Coloration: Skin is not jaundiced or pale.      Findings: No bruising, erythema, lesion or rash.   Neurological:      Mental Status: He is alert.      Motor: No weakness.   Psychiatric:         Behavior: Behavior normal.       Additional Data:     Lab Results:  Results from last 7 days   Lab Units 05/31/24  1656   WBC Thousand/uL 5.28   HEMOGLOBIN g/dL 15.3   HEMATOCRIT % 46.4   PLATELETS Thousands/uL 248   SEGS PCT % 45   LYMPHO PCT % 46*   MONO PCT % 7   EOS PCT % 1     Results from last 7 days   Lab Units 05/31/24  2155 05/31/24  1656   SODIUM mmol/L 132* 129*   POTASSIUM mmol/L 4.0 4.3   CHLORIDE mmol/L 101 96   CO2 mmol/L 26 24   BUN mg/dL 8 9   CREATININE mg/dL 0.93 1.14   ANION GAP mmol/L 5 9   CALCIUM mg/dL 8.2* 8.8   ALBUMIN g/dL  --  4.0   TOTAL BILIRUBIN mg/dL  --  0.68   ALK PHOS U/L  --  81   ALT U/L  --  18   AST U/L  --  15   GLUCOSE RANDOM mg/dL 552* 590*         Results from last 7 days   Lab Units 05/31/24  2600  05/31/24  1655   POC GLUCOSE mg/dl 434* 580*               Lines/Drains:  Invasive Devices       Peripheral Intravenous Line  Duration             Peripheral IV 05/31/24 Left;Ventral (anterior) Forearm <1 day    Peripheral IV 05/31/24 Right Antecubital <1 day                  Imaging: Reviewed radiology reports from this admission including: chest xray and abdominal/pelvic CT  CT abdomen pelvis with contrast   Final Result by Vu Falcon MD (05/31 1905)      No acute abnormality in the abdomen or pelvis.         Workstation performed: [id number]         XR chest portable   ED Interpretation by Hernan Ellis MD (05/31 1848)   Per my independent interpretation: No acute cardiopulmonary process      Final Result by Marine Venegas MD (05/31 2055)      No acute cardiopulmonary disease.            Workstation performed: [id number]             EKG and Other Studies Reviewed on Admission:   EKG: NSR. HR 63.    ** Please Note: This note has been constructed using a voice recognition system. **        Alice Calabrese MD    PGY-2

## 2024-06-01 NOTE — ASSESSMENT & PLAN NOTE
PMH of asthma, with albuterol inhaler PRN.   No wheezing on exam at time of admission.     Plan:   - Claritin   - Albuterol inhaler PRN

## 2024-06-01 NOTE — NUTRITION
Had a lengthy discussion with pt and then his wife when she later came in as it was observed pt had knowledge deficit r/t DM: food sources of CHO, benefits of consistent CHO throughout the day vs 1 large meal, reducing sugary drinks, how adequate protein & fiber can benefit BG levels, looking into nutrition information for common fast food placed pt goes to.     Discussed with pt and wife what OP MNT is and why he may benefit from utilizing this service, pt and wife agreeable. Recommend that a referral for nutrition services be placed in pt's chart for OP MNT.

## 2024-06-01 NOTE — ASSESSMENT & PLAN NOTE
Lab Results   Component Value Date    CHOLESTEROL 181 05/31/2024    TRIG 209 (H) 05/31/2024    HDL 36 (L) 05/31/2024    LDLCALC 103 (H) 05/31/2024     Lab Results   Component Value Date    LIPASE 94 (H) 05/31/2024    LIPASE 94 04/18/2022     Patient with elevated triglycerides and slightly elevated lipase.   Patient denies significant alcohol use and reports cutting back significantly on his fast food intake.   Denies any abdominal pain, N/V.   CT abdomen pelvis visualizes pancreas unremarkable.     Plan:   Recommend incorporating a more whole foods plant-predominant diet along with decreasing consumption of red meats and processed foods  Per AHA guidelines, recommend moderate-vigorous intensity exercise for 30 minutes a day for 5 days a week or a total of 150 min/week

## 2024-06-01 NOTE — ASSESSMENT & PLAN NOTE
Blood Pressure: 105/64    Patient not on any anti-HTN medication PTA     Plan:  Monitor blood pressure  Consider initiating lisinopril 5 mg daily   PRN IV Labetalol and Hydralazine for SBP > 160

## 2024-06-01 NOTE — ED PROVIDER NOTES
History  Chief Complaint   Patient presents with    Hyperglycemia - Symptomatic     Pt seen at PCP today for 30lb weight loss and frequent urination x2 mo, BG >600 at office. Increased thirst, dry mouth.     HPI    Patient is a 38-year-old male with history of hypertension, prediabetes presenting with gradually worsening polyuria, polydipsia, generalized weakness, vision changes.  He states that this has been going on for months but gradually worsening.  He states he is also lost approximately 30 pounds in 1 to 2 months.  He states that he was sick with bronchitis approximately 1 month ago and initially blamed weight loss on this, but states that even after recovering he still feels unwell.  Because of this, he did present to his primary care physician office who was suspicious for worsening of prediabetes and blood glucose testing was done which was significantly elevated.  Patient states he does not have an official diagnosis of diabetes yet and does not take any medications for this.    Prior to Admission Medications   Prescriptions Last Dose Informant Patient Reported? Taking?   Acetaminophen 160 MG TBDP Past Month Self Yes Yes   Sig: Take 160 mg by mouth if needed for mild pain   Ascorbic Acid (vitamin C) 1000 MG tablet Not Taking Self Yes No   Sig: Take 1,000 mg by mouth daily Stopped taking but will resume   Patient not taking: Reported on 1/5/2024   Multiple Vitamins-Minerals (Mens 50+ Advanced) CAPS Not Taking Self Yes No   Sig: Take by mouth Stopped taking but will resume   Patient not taking: Reported on 1/5/2024   Omega-3 Fatty Acids (fish oil) 1,000 mg Not Taking Self No No   Sig: Take 1 capsule (1,000 mg total) by mouth 2 (two) times a day   Patient not taking: Reported on 1/5/2024   Red Yeast Rice Extract 600 MG CAPS Past Week  No Yes   Sig: Take 1200mg daily   Turmeric 400 MG CAPS Not Taking Self Yes No   Sig: Take by mouth Stopped taking but will resume   Patient not taking: Reported on 1/5/2024    albuterol (Proventil HFA) 90 mcg/act inhaler Past Month  No Yes   Sig: Inhale 2 puffs every 6 (six) hours as needed for wheezing   aspirin (ECOTRIN LOW STRENGTH) 81 mg EC tablet Not Taking Self Yes No   Sig: Take 81 mg by mouth daily   Patient not taking: Reported on 1/5/2024   cholecalciferol (VITAMIN D3) 1,000 units tablet Not Taking Self Yes No   Sig: Take 1,000 Units by mouth daily Stopped taking but will resume   Patient not taking: Reported on 1/5/2024   famotidine (PEPCID) 20 mg tablet 5/30/2024 Self Yes Yes   Sig: Take 20 mg by mouth daily   loratadine (CLARITIN) 10 mg tablet Past Month  Yes Yes   Sig: Take 10 mg by mouth daily   nicotine (NICODERM CQ) 21 mg/24 hr TD 24 hr patch Not Taking Self No No   Sig: Place 1 patch on the skin every 24 hours   Patient not taking: Reported on 10/20/2022      Facility-Administered Medications: None       Past Medical History:   Diagnosis Date    Asthma 2005    Bloody stool     Diverticulitis of colon     GERD (gastroesophageal reflux disease)     Headaches, cluster     Hypertension     borderline high, not on meds currently.    Irregular bowel habits     Irregular heart beat     palpitations, unusual chest discomfort. Dr Cheung recommended    Stomach pain        Past Surgical History:   Procedure Laterality Date    COLONOSCOPY      WISDOM TOOTH EXTRACTION         Family History   Problem Relation Age of Onset    Diabetes type II Mother     Graves' disease Mother     Fibromyalgia Mother     Migraines Mother     Other Mother         Spinal stenosis    Transient ischemic attack Mother     Asthma Mother     Hypertension Mother     Rheum arthritis Sister     Asthma Brother     Hypertension Maternal Grandmother     Colorectal Cancer Maternal Grandmother     Kidney disease Maternal Grandfather     Stroke Maternal Grandfather     Heart attack Maternal Grandfather     Diabetes type II Maternal Grandfather     Diabetes type II Maternal Aunt     Hypertension Maternal Uncle      Diabetes Maternal Uncle      I have reviewed and agree with the history as documented.    E-Cigarette/Vaping    E-Cigarette Use Current Some Day User     Start Date 3/31/18     Quit Date 3/31/19      E-Cigarette/Vaping Substances    Nicotine No     THC No     CBD No     Flavoring No     Other No     Unknown No      Social History     Tobacco Use    Smoking status: Former     Current packs/day: 0.50     Average packs/day: 0.5 packs/day for 15.0 years (7.5 ttl pk-yrs)     Types: Cigarettes     Passive exposure: Never    Smokeless tobacco: Never   Vaping Use    Vaping status: Some Days    Start date: 3/31/2018    Last attempt to quit: 3/31/2019   Substance Use Topics    Alcohol use: Yes     Comment: social    Drug use: Never        Review of Systems   Constitutional:  Positive for fatigue and unexpected weight change. Negative for chills and fever.   HENT:  Negative for ear pain and sore throat.    Eyes:  Positive for visual disturbance. Negative for pain.   Respiratory:  Negative for cough and shortness of breath.    Cardiovascular:  Negative for chest pain and palpitations.   Gastrointestinal:  Negative for abdominal pain and vomiting.   Endocrine: Positive for polydipsia and polyuria.   Genitourinary:  Positive for frequency. Negative for dysuria and hematuria.   Musculoskeletal:  Negative for arthralgias and back pain.   Skin:  Negative for color change and rash.   Neurological:  Positive for light-headedness. Negative for syncope.   All other systems reviewed and are negative.      Physical Exam  ED Triage Vitals   Temperature Pulse Respirations Blood Pressure SpO2   05/31/24 1649 05/31/24 1649 05/31/24 1649 05/31/24 1649 05/31/24 1649   98 °F (36.7 °C) 87 20 133/79 98 %      Temp Source Heart Rate Source Patient Position - Orthostatic VS BP Location FiO2 (%)   05/31/24 1649 05/31/24 2021 05/31/24 1900 05/31/24 1900 --   Oral Monitor Sitting Right arm       Pain Score       05/31/24 1649       No Pain              Orthostatic Vital Signs  Vitals:    05/31/24 2216 05/31/24 2230 05/31/24 2345 06/01/24 0726   BP: 123/65 137/81 106/78 105/64   Pulse: 80 66 90 65   Patient Position - Orthostatic VS: Lying          Physical Exam  Vitals and nursing note reviewed.   Constitutional:       General: He is not in acute distress.     Appearance: He is well-developed.   HENT:      Head: Normocephalic and atraumatic.   Eyes:      Conjunctiva/sclera: Conjunctivae normal.   Cardiovascular:      Rate and Rhythm: Normal rate and regular rhythm.      Heart sounds: No murmur heard.  Pulmonary:      Effort: Pulmonary effort is normal. No respiratory distress.      Breath sounds: Normal breath sounds.   Abdominal:      Palpations: Abdomen is soft.      Tenderness: There is abdominal tenderness.      Comments: Right sided abdominal tenderness to palpation   Musculoskeletal:         General: No swelling.   Skin:     General: Skin is warm and dry.   Neurological:      Mental Status: He is alert.   Psychiatric:         Mood and Affect: Mood normal.         ED Medications  Medications   melatonin tablet 3 mg (has no administration in time range)   acetaminophen (TYLENOL) tablet 975 mg (has no administration in time range)   calcium carbonate (TUMS) chewable tablet 500 mg (has no administration in time range)   famotidine (PEPCID) tablet 20 mg (20 mg Oral Given 6/1/24 0905)   loratadine (CLARITIN) tablet 10 mg (10 mg Oral Not Given 6/1/24 0905)   albuterol (PROVENTIL HFA,VENTOLIN HFA) inhaler 2 puff (has no administration in time range)   insulin lispro (HumALOG/ADMELOG) 100 units/mL subcutaneous injection 1-6 Units (5 Units Subcutaneous Given 6/1/24 1203)   insulin lispro (HumALOG/ADMELOG) 100 units/mL subcutaneous injection 5 Units (5 Units Subcutaneous Given 6/1/24 1203)   insulin glargine (LANTUS) subcutaneous injection 15 Units 0.15 mL (has no administration in time range)   atorvastatin (LIPITOR) tablet 20 mg (has no administration in time  range)   sodium chloride 0.9 % bolus 1,000 mL (0 mL Intravenous Stopped 5/31/24 2020)   iohexol (OMNIPAQUE) 350 MG/ML injection (MULTI-DOSE) 100 mL (100 mL Intravenous Given 5/31/24 1848)   insulin glargine (LANTUS) subcutaneous injection 10 Units 0.1 mL (10 Units Subcutaneous Given 6/1/24 0428)   magnesium sulfate 2 g/50 mL IVPB (premix) 2 g (0 g Intravenous Stopped 6/1/24 0901)   insulin glargine (LANTUS) subcutaneous injection 5 Units 0.05 mL (5 Units Subcutaneous Given 6/1/24 1114)       Diagnostic Studies  Results Reviewed       Procedure Component Value Units Date/Time    Hemoglobin A1C [[id number]]  (Abnormal) Collected: 05/31/24 1755    Lab Status: Final result Specimen: Blood from Arm, Left Updated: 06/01/24 0040     Hemoglobin A1C 14.5 %       mg/dl     Basic metabolic panel [[id number]]  (Abnormal) Collected: 05/31/24 2155    Lab Status: Final result Specimen: Blood from Arm, Right Updated: 05/31/24 2234     Sodium 132 mmol/L      Potassium 4.0 mmol/L      Chloride 101 mmol/L      CO2 26 mmol/L      ANION GAP 5 mmol/L      BUN 8 mg/dL      Creatinine 0.93 mg/dL      Glucose 552 mg/dL      Calcium 8.2 mg/dL      eGFR 105 ml/min/1.73sq m     Narrative:      National Kid[employer] Foundation guidelines for Chronic Kidney Disease (CKD):     Stage 1 with normal or high GFR (GFR > 90 mL/min/1.73 square meters)    Stage 2 Mild CKD (GFR = 60-89 mL/min/1.73 square meters)    Stage 3A Moderate CKD (GFR = 45-59 mL/min/1.73 square meters)    Stage 3B Moderate CKD (GFR = 30-44 mL/min/1.73 square meters)    Stage 4 Severe CKD (GFR = 15-29 mL/min/1.73 square meters)    Stage 5 End Stage CKD (GFR <15 mL/min/1.73 square meters)  Note: GFR calculation is accurate only with a steady state creatinine    IA2 Autoantibodies [[id number]] Collected: 05/31/24 2155    Lab Status: In process Specimen: Blood from Arm, Right Updated: 05/31/24 2210    Glutamic acid decarboxylase [[id number]] Collected: 05/31/24 2155    Lab Status:  In process Specimen: Blood from Arm, Right Updated: 05/31/24 2210    Zinc Transporter 8 (Znt8) Antibody [[id number]] Collected: 05/31/24 2155    Lab Status: In process Specimen: Blood from Arm, Right Updated: 05/31/24 2209    Insulin antibody [[id number]] Collected: 05/31/24 2155    Lab Status: In process Specimen: Blood from Arm, Right Updated: 05/31/24 2209    TSH, 3rd generation with Free T4 reflex [[id number]]  (Normal) Collected: 05/31/24 1755    Lab Status: Final result Specimen: Blood from Arm, Left Updated: 05/31/24 2142     TSH 3RD GENERATON 0.930 uIU/mL     Fingerstick Glucose (POCT) [[id number]]  (Abnormal) Collected: 05/31/24 2129    Lab Status: Final result Specimen: Blood Updated: 05/31/24 2130     POC Glucose 434 mg/dl     Lipid Panel with Direct LDL reflex [[id number]]  (Abnormal) Collected: 05/31/24 1755    Lab Status: Final result Specimen: Blood from Arm, Left Updated: 05/31/24 2116     Cholesterol 181 mg/dL      Triglycerides 209 mg/dL      HDL, Direct 36 mg/dL      LDL Calculated 103 mg/dL     Urine Microscopic [[id number]]  (Normal) Collected: 05/31/24 1932    Lab Status: Final result Specimen: Urine, Clean Catch Updated: 05/31/24 1952     RBC, UA None Seen /hpf      WBC, UA None Seen /hpf      Epithelial Cells None Seen /hpf      Bacteria, UA None Seen /hpf     UA w Reflex to Microscopic w Reflex to Culture [[id number]]  (Abnormal) Collected: 05/31/24 1932    Lab Status: Final result Specimen: Urine, Clean Catch Updated: 05/31/24 1952     Color, UA Colorless     Clarity, UA Clear     Specific Gravity, UA >=1.050     pH, UA 6.5     Leukocytes, UA Elevated glucose may cause decreased leukocyte values. See urine microscopic for UWBC result     Nitrite, UA Negative     Protein, UA Negative mg/dl      Glucose, UA >=1000 (1%) mg/dl      Ketones, UA Negative mg/dl      Urobilinogen, UA <2.0 mg/dl      Bilirubin, UA Negative     Occult Blood, UA Negative    Blood gas, venous [[id number]]  (Abnormal)  Collected: 05/31/24 1841    Lab Status: Final result Specimen: Blood from Arm, Left Updated: 05/31/24 1854     pH, Issac 7.467     pCO2, Issac 19.1 mm Hg      pO2, Issac 147.0 mm Hg      HCO3, Issac 13.5 mmol/L      Base Excess, Issac -8.1 mmol/L      O2 Content, Issac 15.2 ml/dL      O2 HGB, VENOUS 92.4 %     HS Troponin 0hr (reflex protocol) [[id number]]  (Normal) Collected: 05/31/24 1755    Lab Status: Final result Specimen: Blood from Arm, Left Updated: 05/31/24 1837     hs TnI 0hr <2 ng/L     Magnesium [[id number]]  (Normal) Collected: 05/31/24 1755    Lab Status: Final result Specimen: Blood from Arm, Left Updated: 05/31/24 1830     Magnesium 1.9 mg/dL     Phosphorus [[id number]]  (Abnormal) Collected: 05/31/24 1755    Lab Status: Final result Specimen: Blood from Arm, Left Updated: 05/31/24 1830     Phosphorus 2.3 mg/dL     Comprehensive metabolic panel [[id number]]  (Abnormal) Collected: 05/31/24 1656    Lab Status: Final result Specimen: Blood from Arm, Right Updated: 05/31/24 1747     Sodium 129 mmol/L      Potassium 4.3 mmol/L      Chloride 96 mmol/L      CO2 24 mmol/L      ANION GAP 9 mmol/L      BUN 9 mg/dL      Creatinine 1.14 mg/dL      Glucose 590 mg/dL      Calcium 8.8 mg/dL      AST 15 U/L      ALT 18 U/L      Alkaline Phosphatase 81 U/L      Total Protein 7.1 g/dL      Albumin 4.0 g/dL      Total Bilirubin 0.68 mg/dL      eGFR 82 ml/min/1.73sq m     Narrative:      National Kidney Disease Foundation guidelines for Chronic Kidney Disease (CKD):     Stage 1 with normal or high GFR (GFR > 90 mL/min/1.73 square meters)    Stage 2 Mild CKD (GFR = 60-89 mL/min/1.73 square meters)    Stage 3A Moderate CKD (GFR = 45-59 mL/min/1.73 square meters)    Stage 3B Moderate CKD (GFR = 30-44 mL/min/1.73 square meters)    Stage 4 Severe CKD (GFR = 15-29 mL/min/1.73 square meters)    Stage 5 End Stage CKD (GFR <15 mL/min/1.73 square meters)  Note: GFR calculation is accurate only with a steady state creatinine    Lipase [[id number]]   (Abnormal) Collected: 05/31/24 1656    Lab Status: Final result Specimen: Blood from Arm, Right Updated: 05/31/24 1745     Lipase 94 u/L     Beta Hydroxybutyrate [[id number]]  (Normal) Collected: 05/31/24 1656    Lab Status: Final result Specimen: Blood from Arm, Right Updated: 05/31/24 1745     Beta- Hydroxybutyrate 0.07 mmol/L     CBC and differential [[id number]]  (Abnormal) Collected: 05/31/24 1656    Lab Status: Final result Specimen: Blood from Arm, Right Updated: 05/31/24 1716     WBC 5.28 Thousand/uL      RBC 5.56 Million/uL      Hemoglobin 15.3 g/dL      Hematocrit 46.4 %      MCV 84 fL      MCH 27.5 pg      MCHC 33.0 g/dL      RDW 11.9 %      MPV 12.3 fL      Platelets 248 Thousands/uL      nRBC 0 /100 WBCs      Segmented % 45 %      Immature Grans % 0 %      Lymphocytes % 46 %      Monocytes % 7 %      Eosinophils Relative 1 %      Basophils Relative 1 %      Absolute Neutrophils 2.40 Thousands/µL      Absolute Immature Grans 0.01 Thousand/uL      Absolute Lymphocytes 2.40 Thousands/µL      Absolute Monocytes 0.37 Thousand/µL      Eosinophils Absolute 0.06 Thousand/µL      Basophils Absolute 0.04 Thousands/µL     Fingerstick Glucose (POCT) [[id number]]  (Abnormal) Collected: 05/31/24 1655    Lab Status: Final result Specimen: Blood Updated: 05/31/24 1656     POC Glucose 580 mg/dl                    CT abdomen pelvis with contrast   Final Result by Vu Falcon MD (05/31 1905)      No acute abnormality in the abdomen or pelvis.         Workstation performed: [id number]         XR chest portable   ED Interpretation by Hernan Ellis MD (05/31 1848)   Per my independent interpretation: No acute cardiopulmonary process      Final Result by Marine Venegas MD (05/31 2055)      No acute cardiopulmonary disease.            Workstation performed: [id number]               Procedures  ECG 12 Lead Documentation Only    Date/Time: 6/1/2024 2:18 PM    Performed by: Lupis Lundberg,    Authorized by: Lupis Lundberg DO    Indications / Diagnosis:  Concern for DKA, electrolyte abnormality  ECG reviewed by me, the ED Provider: yes    Patient location:  ED  Previous ECG:     Previous ECG:  Compared to current    Similarity:  No change  Interpretation:     Interpretation: normal    Rate:     ECG rate:  63    ECG rate assessment: normal    Rhythm:     Rhythm: sinus rhythm    Ectopy:     Ectopy: none    QRS:     QRS axis:  Normal  Conduction:     Conduction: normal    ST segments:     ST segments:  Normal  T waves:     T waves: normal          ED Course  ED Course as of 06/01/24 1412   Fri May 31, 2024   1749 Sodium(!): 129  Corrected with Glucose: 137             HEART Risk Score      Flowsheet Row Most Recent Value   Heart Score Risk Calculator    History 0 Filed at: 05/31/2024 1849   ECG 0 Filed at: 05/31/2024 1849   Age 0 Filed at: 05/31/2024 1849   Risk Factors 2 Filed at: 05/31/2024 1849   Troponin 0 Filed at: 05/31/2024 1849   HEART Score 2 Filed at: 05/31/2024 1849                        SBIRT 20yo+      Flowsheet Row Most Recent Value   Initial Alcohol Screen: US AUDIT-C     1. How often do you have a drink containing alcohol? 0 Filed at: 05/31/2024 1649   2. How many drinks containing alcohol do you have on a typical day you are drinking?  0 Filed at: 05/31/2024 1649   3a. Male UNDER 65: How often do you have five or more drinks on one occasion? 0 Filed at: 05/31/2024 1649   3b. FEMALE Any Age, or MALE 65+: How often do you have 4 or more drinks on one occassion? 0 Filed at: 05/31/2024 1649   Audit-C Score 0 Filed at: 05/31/2024 1649   PRUDENCE: How many times in the past year have you...    Used an illegal drug or used a prescription medication for non-medical reasons? Never Filed at: 05/31/2024 1649                  Medical Decision Making  38-year-old male patient presenting with elevated blood glucose reading outpatient along with fatigue, polyuria, polydipsia.  Based on  the patient's HPI and fingerstick glucose on arrival, patient has now progressed from being prediabetic to diabetic.  He appeared in no acute distress and was with stable vital signs on arrival.  Physical examination was significant only for some right lower quadrant abdominal tenderness to palpation.  Given significant hyperglycemia, concern is highest for DKA versus HHS or simple hyperglycemia with poorly controlled diabetes.  Lab workup showed hyperglycemia, low pCO2 concerning for hyperventilation, but no evidence of DKA.  CT scan of the abdomen was done to rule out infectious etiology that could have predisposed the patient to the patient was hydrated and case was ultimately discussed with [facility] who accepted the patient for further workup and management of new onset type 2 diabetes with severe hyperglycemia.    Problems Addressed:  Acute chest pain: acute illness or injury  Diabetes mellitus, new onset (HCC): acute illness or injury  Elevated blood pressure reading: acute illness or injury    Amount and/or Complexity of Data Reviewed  Labs: ordered. Decision-making details documented in ED Course.  Radiology: ordered and independent interpretation performed.    Risk  Prescription drug management.  Decision regarding hospitalization.          Disposition  Final diagnoses:   Diabetes mellitus, new onset (HCC)   Elevated blood pressure reading   Acute chest pain     Time reflects when diagnosis was documented in both MDM as applicable and the Disposition within this note       Time User Action Codes Description Comment    5/31/2024  8:49 PM Alice Calabrese Add [E11.9] Diabetes (HCC)     5/31/2024  8:57 PM Hernan Ellis Add [E11.9] Diabetes mellitus, new onset (HCC)     5/31/2024  8:57 PM Hernan Ellis Add [R03.0] Elevated blood pressure reading     5/31/2024  8:57 PM Hernan Ellis Add [R07.9] Acute chest pain           ED Disposition       ED Disposition   Admit    Condition   Stable     Date/Time   Fri May 31, 2024 2057    Comment   Case was discussed with VALENTIN and the patient's admission status was agreed to be Admission Status: inpatient status to the service of Dr. Butcher .               Follow-up Information    None         Current Discharge Medication List        START taking these medications    Details   Alcohol Swabs 70 % PADS May substitute brand based on insurance coverage. Check glucose TID.  Qty: 100 each, Refills: 0    Associated Diagnoses: Diabetes (HCC)      Blood Glucose Monitoring Suppl (OneTouch Verio Reflect) w/Device KIT May substitute brand based on insurance coverage. Check glucose TID.  Qty: 1 kit, Refills: 0    Associated Diagnoses: Diabetes (HCC)      glucose blood (OneTouch Verio) test strip May substitute brand based on insurance coverage. Check glucose TID.  Qty: 100 each, Refills: 0    Associated Diagnoses: Diabetes (HCC)      insulin degludec (Tresiba FlexTouch) 100 units/mL injection pen Inject 15 Units under the skin daily at bedtime  Qty: 15 mL, Refills: 0    Associated Diagnoses: Diabetes (HCC)      insulin lispro (HumaLOG KwikPen) 100 units/mL injection pen Inject 5 Units under the skin 3 (three) times a day with meals  Qty: 15 mL, Refills: 0    Associated Diagnoses: Diabetes (HCC)      !! Insulin Pen Needle (BD Pen Needle Shu 2nd Gen) 32G X 4 MM MISC For use with insulin pen. Pharmacy may dispense brand covered by insurance.  Qty: 100 each, Refills: 0    Associated Diagnoses: Diabetes (HCC)      !! Insulin Pen Needle (BD Pen Needle Shu 2nd Gen) 32G X 4 MM MISC For use with insulin pen. Pharmacy may dispense brand covered by insurance.  Qty: 100 each, Refills: 0    Associated Diagnoses: Diabetes (HCC)      OneTouch Delica Lancets 33G MISC May substitute brand based on insurance coverage. Check glucose TID.  Qty: 100 each, Refills: 0    Associated Diagnoses: Diabetes (HCC)       !! - Potential duplicate medications found. Please discuss with provider.         CONTINUE these medications which have NOT CHANGED    Details   Acetaminophen 160 MG TBDP Take 160 mg by mouth if needed for mild pain      albuterol (Proventil HFA) 90 mcg/act inhaler Inhale 2 puffs every 6 (six) hours as needed for wheezing  Qty: 6.7 g, Refills: 0    Comments: Substitution to a formulary equivalent within the same pharmaceutical class is authorized.  Associated Diagnoses: Acute bacterial bronchitis      famotidine (PEPCID) 20 mg tablet Take 20 mg by mouth daily      loratadine (CLARITIN) 10 mg tablet Take 10 mg by mouth daily      Red Yeast Rice Extract 600 MG CAPS Take 1200mg daily  Qty: 180 capsule, Refills: 1    Associated Diagnoses: Hypertension, unspecified type; Elevated BP without diagnosis of hypertension; Mixed hyperlipidemia      Ascorbic Acid (vitamin C) 1000 MG tablet Take 1,000 mg by mouth daily Stopped taking but will resume      aspirin (ECOTRIN LOW STRENGTH) 81 mg EC tablet Take 81 mg by mouth daily      cholecalciferol (VITAMIN D3) 1,000 units tablet Take 1,000 Units by mouth daily Stopped taking but will resume      Multiple Vitamins-Minerals (Mens 50+ Advanced) CAPS Take by mouth Stopped taking but will resume      nicotine (NICODERM CQ) 21 mg/24 hr TD 24 hr patch Place 1 patch on the skin every 24 hours  Qty: 28 patch, Refills: 0    Associated Diagnoses: Tobacco dependence      Omega-3 Fatty Acids (fish oil) 1,000 mg Take 1 capsule (1,000 mg total) by mouth 2 (two) times a day  Qty: 60 capsule, Refills: 3    Associated Diagnoses: Elevated triglycerides with high cholesterol      Turmeric 400 MG CAPS Take by mouth Stopped taking but will resume           Outpatient Discharge Orders   Continous glucose monitoring dexcom placement   Standing Status: Future Standing Exp. Date: 06/01/25     Continous glucose monitoring dexcom intrepretation   Standing Status: Future Standing Exp. Date: 06/01/25       PDMP Review       None             ED Provider  Attending physically available  and evaluated Jean Guerrero. I managed the patient along with the ED Attending.    Electronically Signed by           Lupis Lundberg,   06/01/24 5822

## 2024-06-01 NOTE — ASSESSMENT & PLAN NOTE
Lab Results   Component Value Date    HGBA1C 5.8 (H) 09/30/2021       Recent Labs     05/31/24  1655 05/31/24 2129   POCGLU 580* 434*     Pt w/ hx of pre-diabetes and family hx of diabetes presents with 3 weeks of polydipsia, polyuria, and weight loss   Blood glu on arrival 580   Pt not on DM medications PTA     Plan:   Hgb A1c   Diabetic diet  Insulin gtt (non-DKA) until blood glu < 200   Transition to subq once glu < 200   Glucose checks and Insulin correction ACHS  Goal -180 while admitted, adjusting insulin regimen as appropriate  /hr w/ 20 K   Q4h BMP   Monitor for hypoglycemia and treat per protocol  DM Ab testing   Thyroid studies   Lipid panel   Endocrinology consulted

## 2024-06-01 NOTE — ASSESSMENT & PLAN NOTE
Patient uses vape/e-cigarrette daily   Declines nicotine replacement therapy on admission     Plan:   - Tobacco cessation at discharge

## 2024-06-01 NOTE — ASSESSMENT & PLAN NOTE
Blood Pressure: 137/81    Patient not on any anti-HTN medication PTA     Plan:  Monitor blood pressure  Consider initiating lisinopril 5 mg daily   PRN IV Labetalol and Hydralazine for SBP > 160

## 2024-06-01 NOTE — PLAN OF CARE
Problem: PAIN - ADULT  Goal: Verbalizes/displays adequate comfort level or baseline comfort level  Description: Interventions:  - Encourage patient to monitor pain and request assistance  - Assess pain using appropriate pain scale  - Administer analgesics based on type and severity of pain and evaluate response  - Implement non-pharmacological measures as appropriate and evaluate response  - Consider cultural and social influences on pain and pain management  - Notify physician/advanced practitioner if interventions unsuccessful or patient reports new pain  Outcome: Progressing     Problem: INFECTION - ADULT  Goal: Absence or prevention of progression during hospitalization  Description: INTERVENTIONS:  - Assess and monitor for signs and symptoms of infection  - Monitor lab/diagnostic results  - Monitor all insertion sites, i.e. indwelling lines, tubes, and drains  - Monitor endotracheal if appropriate and nasal secretions for changes in amount and color  - [place] appropriate cooling/warming therapies per order  - Administer medications as ordered  - Instruct and encourage patient and family to use good hand hygiene technique  - Identify and instruct in appropriate isolation precautions for identified infection/condition  Outcome: Progressing  Goal: Absence of fever/infection during neutropenic period  Description: INTERVENTIONS:  - Monitor WBC    Outcome: Progressing     Problem: SAFETY ADULT  Goal: Patient will remain free of falls  Description: INTERVENTIONS:  - Educate patient/family on patient safety including physical limitations  - Instruct patient to call for assistance with activity   - Consult OT/PT to assist with strengthening/mobility   - Keep Call bell within reach  - Keep bed low and locked with side rails adjusted as appropriate  - Keep care items and personal belongings within reach  - Initiate and maintain comfort rounds  - Make Fall Risk Sign visible to staff  - Offer Toileting every  Hours,  in advance of need  - Initiate/Maintain alarm  - Obtain necessary fall risk management equipment:   - Apply yellow socks and bracelet for high fall risk patients  - Consider moving patient to room near nurses station  Outcome: Progressing  Goal: Maintain or return to baseline ADL function  Description: INTERVENTIONS:  -  Assess patient's ability to carry out ADLs; assess patient's baseline for ADL function and identify physical deficits which impact ability to perform ADLs (bathing, care of mouth/teeth, toileting, grooming, dressing, etc.)  - Assess/evaluate cause of self-care deficits   - Assess range of motion  - Assess patient's mobility; develop plan if impaired  - Assess patient's need for assistive devices and provide as appropriate  - Encourage maximum independence but intervene and supervise when necessary  - Involve family in performance of ADLs  - Assess for home care needs following discharge   - Consider OT consult to assist with ADL evaluation and planning for discharge  - Provide patient education as appropriate  Outcome: Progressing  Goal: Maintains/Returns to pre admission functional level  Description: INTERVENTIONS:  - Perform AM-PAC 6 Click Basic Mobility/ Daily Activity assessment daily.  - Set and communicate daily mobility goal to care team and patient/family/caregiver.   - Collaborate with rehabilitation services on mobility goals if consulted  - Perform Range of Motion  times a day.  - Reposition patient every  hours.  - Dangle patient  times a day  - Stand patient  times a day  - Ambulate patient  times a day  - Out of bed to chair  times a day   - Out of bed for meals  times a day  - Out of bed for toileting  - Record patient progress and toleration of activity level   Outcome: Progressing     Problem: DISCHARGE PLANNING  Goal: Discharge to home or other facility with appropriate resources  Description: INTERVENTIONS:  - Identify barriers to discharge w/patient and caregiver  - Arrange for  needed discharge resources and transportation as appropriate  - Identify discharge learning needs (meds, wound care, etc.)  - Arrange for interpretive services to assist at discharge as needed  - Refer to Case Management Department for coordinating discharge planning if the patient needs post-hospital services based on physician/advanced practitioner order or complex needs related to functional status, cognitive ability, or social support system  Outcome: Progressing     Problem: Knowledge Deficit  Goal: Patient/family/caregiver demonstrates understanding of disease process, treatment plan, medications, and discharge instructions  Description: Complete learning assessment and assess knowledge base.  Interventions:  - Provide teaching at level of understanding  - Provide teaching via preferred learning methods  Outcome: Progressing

## 2024-06-01 NOTE — ASSESSMENT & PLAN NOTE
Lab Results   Component Value Date    HGBA1C 14.5 (H) 05/31/2024       Recent Labs     06/01/24  0418 06/01/24  0725 06/01/24  1042   POCGLU 149* 203* 364*       Pt w/ hx of pre-diabetes and family hx of diabetes presents with 3 weeks of polydipsia, polyuria, and weight loss   Blood glu on arrival 580   Pt not on DM medications PTA   HbA1c 14.5%, initiated started on non-DKA insulin drip, currently on subcutaneous insulin.    Plan:   Continue Lantus 15 at bedtime and Humalog 5 units 3 times daily with insulin sliding scale  Diabetic diet  Goal -180 while admitted, adjusting insulin regimen as appropriate  Monitor for hypoglycemia and treat per protocol  DM Ab testing   Endocrinology consulted, appreciate recommendations  Diabetic and insulin education  Start atorvastatin 20 mg

## 2024-06-01 NOTE — PROGRESS NOTES
[facility]  Progress Note  Name: Jean Guerrero I  MRN: [id number]  Unit/Bed#: W -01 I Date of Admission: 5/31/2024   Date of Service: 6/1/2024 I Hospital Day: 1    Assessment & Plan   * Diabetes (HCC)  Assessment & Plan    Lab Results   Component Value Date    HGBA1C 14.5 (H) 05/31/2024       Recent Labs     06/01/24  0418 06/01/24  0725 06/01/24  1042   POCGLU 149* 203* 364*       Pt w/ hx of pre-diabetes and family hx of diabetes presents with 3 weeks of polydipsia, polyuria, and weight loss   Blood glu on arrival 580   Pt not on DM medications PTA   HbA1c 14.5%, initiated started on non-DKA insulin drip, currently on subcutaneous insulin.    Plan:   Continue Lantus 15 at bedtime and Humalog 5 units 3 times daily with insulin sliding scale  Diabetic diet  Goal -180 while admitted, adjusting insulin regimen as appropriate  Monitor for hypoglycemia and treat per protocol  DM Ab testing   Endocrinology consulted, appreciate recommendations  Diabetic and insulin education  Start atorvastatin 20 mg    HTN (hypertension)  Assessment & Plan  Blood Pressure: 105/64    Patient not on any anti-HTN medication PTA     Plan:  Monitor blood pressure  Consider initiating lisinopril 5 mg daily   PRN IV Labetalol and Hydralazine for SBP > 160      Hypertriglyceridemia  Assessment & Plan  Lab Results   Component Value Date    CHOLESTEROL 181 05/31/2024    TRIG 209 (H) 05/31/2024    HDL 36 (L) 05/31/2024    LDLCALC 103 (H) 05/31/2024     Lab Results   Component Value Date    LIPASE 94 (H) 05/31/2024    LIPASE 94 04/18/2022     Patient with elevated triglycerides and slightly elevated lipase.   Patient denies significant alcohol use and reports cutting back significantly on his fast food intake.   Denies any abdominal pain, N/V.   CT abdomen pelvis visualizes pancreas unremarkable.     Plan:   Recommend incorporating a more whole foods plant-predominant diet along with decreasing consumption of  red meats and processed foods  Per AHA guidelines, recommend moderate-vigorous intensity exercise for 30 minutes a day for 5 days a week or a total of 150 min/week    Asthma  Assessment & Plan  PMH of asthma, with albuterol inhaler PRN.   No wheezing on exam at time of admission.     Plan:   - Claritin   - Albuterol inhaler PRN    Tobacco dependence  Assessment & Plan  Patient uses vape/e-cigarrette daily   Declines nicotine replacement therapy on admission     Plan:   - Tobacco cessation at discharge     JOAQUIN (obstructive sleep apnea)  Assessment & Plan  CPAP qhs           VTE Pharmacologic Prophylaxis: VTE Score: 1 Low Risk (Score 0-2) - Encourage Ambulation.    Mobility:   Basic Mobility Inpatient Raw Score: 24  JH-HLM Goal: 8: Walk 250 feet or more  JH-HLM Achieved: 8: Walk 250 feet ot more  JH-HLM Goal achieved. Continue to encourage appropriate mobility.    Patient Centered Rounds: I performed bedside rounds with nursing staff today.  Discussions with Specialists or Other Care Team Provider:     Education and Discussions with Family / Patient: Attempted to update  (mother) via phone. Left voicemail.     Current Length of Stay: 1 day(s)  Current Patient Status: Inpatient   Discharge Plan: Anticipate discharge in 24-48 hrs to home.    Code Status: Level 1 - Full Code    Subjective:   Patient was seen and examined at bedside.  Complains of mild abdominal pain.  Denies any other pain or discomfort.  Tolerating diet well.  No episodes of hypoglycemia.    Objective:     Vitals:   Temp (24hrs), Av °F (36.7 °C), Min:97.6 °F (36.4 °C), Max:98.3 °F (36.8 °C)    Temp:  [97.6 °F (36.4 °C)-98.3 °F (36.8 °C)] 97.6 °F (36.4 °C)  HR:  [61-90] 65  Resp:  [14-20] 14  BP: (105-137)/(64-91) 105/64  SpO2:  [96 %-100 %] 97 %  Body mass index is 27.54 kg/m².     Input and Output Summary (last 24 hours):     Intake/Output Summary (Last 24 hours) at 2024 1550  Last data filed at 2024 1300  Gross per 24 hour    Intake 840 ml   Output --   Net 840 ml       Physical Exam:   Physical Exam  Vitals and nursing note reviewed.   Constitutional:       General: He is not in acute distress.     Appearance: He is well-developed.   HENT:      Head: Normocephalic and atraumatic.   Eyes:      Conjunctiva/sclera: Conjunctivae normal.   Cardiovascular:      Rate and Rhythm: Normal rate and regular rhythm.      Heart sounds: No murmur heard.  Pulmonary:      Effort: Pulmonary effort is normal. No respiratory distress.      Breath sounds: Normal breath sounds.   Abdominal:      Palpations: Abdomen is soft.      Tenderness: There is no abdominal tenderness.   Musculoskeletal:         General: No swelling.      Cervical back: Neck supple.   Skin:     General: Skin is warm and dry.      Capillary Refill: Capillary refill takes less than 2 seconds.   Neurological:      Mental Status: He is alert.   Psychiatric:         Mood and Affect: Mood normal.          Additional Data:     Labs:  Results from last 7 days   Lab Units 06/01/24  0438 05/31/24  1656   WBC Thousand/uL 9.12 5.28   HEMOGLOBIN g/dL 12.9 15.3   HEMATOCRIT % 39.2 46.4   PLATELETS Thousands/uL 246 248   SEGS PCT %  --  45   LYMPHO PCT %  --  46*   MONO PCT %  --  7   EOS PCT %  --  1     Results from last 7 days   Lab Units 06/01/24  0438   SODIUM mmol/L 137   POTASSIUM mmol/L 3.7   CHLORIDE mmol/L 106   CO2 mmol/L 25   BUN mg/dL 13   CREATININE mg/dL 0.83   ANION GAP mmol/L 6   CALCIUM mg/dL 7.8*   ALBUMIN g/dL 3.2*   TOTAL BILIRUBIN mg/dL 0.27   ALK PHOS U/L 66   ALT U/L 13   AST U/L 12*   GLUCOSE RANDOM mg/dL 147*         Results from last 7 days   Lab Units 06/01/24  1042 06/01/24  0725 06/01/24  0418 06/01/24  0149 05/31/24  2340 05/31/24  2129 05/31/24  1655   POC GLUCOSE mg/dl 364* 203* 149* 119 241* 434* 580*     Results from last 7 days   Lab Units 05/31/24  1755   HEMOGLOBIN A1C % 14.5*           Lines/Drains:  Invasive Devices       Peripheral Intravenous Line  Duration              Peripheral IV 05/31/24 Left;Ventral (anterior) Forearm <1 day    Peripheral IV 05/31/24 Right Antecubital <1 day                          Imaging: Reviewed radiology reports from this admission including: abdominal/pelvic CT    Recent Cultures (last 7 days):         Last 24 Hours Medication List:   Current Facility-Administered Medications   Medication Dose Route Frequency Provider Last Rate    acetaminophen  975 mg Oral Q8H PRN Alice Calabrese MD      albuterol  2 puff Inhalation Q6H PRN Alice Calabrese MD      atorvastatin  20 mg Oral Daily With Dinner Ruben Abdi MD      calcium carbonate  500 mg Oral TID PRN Alice Calabrese MD      famotidine  20 mg Oral Daily Alice Calabrese MD      [START ON 6/2/2024] insulin glargine  15 Units Subcutaneous HS Ruben Abdi MD      insulin lispro  1-6 Units Subcutaneous 4x Daily (AC & HS) Alice Calabrese MD      insulin lispro  5 Units Subcutaneous TID With Meals Ruben Abdi MD      loratadine  10 mg Oral Daily Alice Calabrese MD      melatonin  3 mg Oral HS PRN Alice Calabrese MD          Today, Patient Was Seen By: Ruben Abdi MD    **Please Note: This note may have been constructed using a voice recognition system.**

## 2024-06-02 DIAGNOSIS — E13.9 LADA (LATENT AUTOIMMUNE DIABETES IN ADULTS), MANAGED AS TYPE 1 (HCC): Primary | ICD-10-CM

## 2024-06-02 LAB
ALBUMIN SERPL BCP-MCNC: 3.2 G/DL (ref 3.5–5)
ALP SERPL-CCNC: 61 U/L (ref 34–104)
ALT SERPL W P-5'-P-CCNC: 14 U/L (ref 7–52)
ANION GAP SERPL CALCULATED.3IONS-SCNC: 4 MMOL/L (ref 4–13)
AST SERPL W P-5'-P-CCNC: 14 U/L (ref 13–39)
ATRIAL RATE: 63 BPM
BILIRUB SERPL-MCNC: 0.58 MG/DL (ref 0.2–1)
BUN SERPL-MCNC: 16 MG/DL (ref 5–25)
CALCIUM ALBUM COR SERPL-MCNC: 8.7 MG/DL (ref 8.3–10.1)
CALCIUM SERPL-MCNC: 8.1 MG/DL (ref 8.4–10.2)
CHLORIDE SERPL-SCNC: 105 MMOL/L (ref 96–108)
CO2 SERPL-SCNC: 27 MMOL/L (ref 21–32)
CREAT SERPL-MCNC: 0.84 MG/DL (ref 0.6–1.3)
GFR SERPL CREATININE-BSD FRML MDRD: 112 ML/MIN/1.73SQ M
GLUCOSE SERPL-MCNC: 189 MG/DL (ref 65–140)
GLUCOSE SERPL-MCNC: 196 MG/DL (ref 65–140)
GLUCOSE SERPL-MCNC: 198 MG/DL (ref 65–140)
GLUCOSE SERPL-MCNC: 233 MG/DL (ref 65–140)
GLUCOSE SERPL-MCNC: 324 MG/DL (ref 65–140)
P AXIS: 60 DEGREES
POTASSIUM SERPL-SCNC: 3.7 MMOL/L (ref 3.5–5.3)
PR INTERVAL: 166 MS
PROT SERPL-MCNC: 5.8 G/DL (ref 6.4–8.4)
QRS AXIS: 63 DEGREES
QRSD INTERVAL: 84 MS
QT INTERVAL: 376 MS
QTC INTERVAL: 384 MS
SODIUM SERPL-SCNC: 136 MMOL/L (ref 135–147)
T WAVE AXIS: 41 DEGREES
VENTRICULAR RATE: 63 BPM

## 2024-06-02 PROCEDURE — 80053 COMPREHEN METABOLIC PANEL: CPT | Performed by: INTERNAL MEDICINE

## 2024-06-02 PROCEDURE — 94760 N-INVAS EAR/PLS OXIMETRY 1: CPT

## 2024-06-02 PROCEDURE — 99222 1ST HOSP IP/OBS MODERATE 55: CPT | Performed by: INTERNAL MEDICINE

## 2024-06-02 PROCEDURE — 99232 SBSQ HOSP IP/OBS MODERATE 35: CPT | Performed by: INTERNAL MEDICINE

## 2024-06-02 PROCEDURE — 82948 REAGENT STRIP/BLOOD GLUCOSE: CPT

## 2024-06-02 PROCEDURE — 93010 ELECTROCARDIOGRAM REPORT: CPT | Performed by: INTERNAL MEDICINE

## 2024-06-02 RX ORDER — ACYCLOVIR 400 MG/1
1 TABLET ORAL
Qty: 3 EACH | Refills: 2 | Status: SHIPPED | OUTPATIENT
Start: 2024-06-02

## 2024-06-02 RX ORDER — ACYCLOVIR 400 MG/1
1 TABLET ORAL CONTINUOUS
Qty: 1 EACH | Refills: 0 | Status: SHIPPED | OUTPATIENT
Start: 2024-06-02

## 2024-06-02 RX ADMIN — INSULIN LISPRO 5 UNITS: 100 INJECTION, SOLUTION INTRAVENOUS; SUBCUTANEOUS at 16:43

## 2024-06-02 RX ADMIN — ALBUTEROL SULFATE 2 PUFF: 90 AEROSOL, METERED RESPIRATORY (INHALATION) at 22:42

## 2024-06-02 RX ADMIN — INSULIN LISPRO 1 UNITS: 100 INJECTION, SOLUTION INTRAVENOUS; SUBCUTANEOUS at 07:50

## 2024-06-02 RX ADMIN — INSULIN LISPRO 2 UNITS: 100 INJECTION, SOLUTION INTRAVENOUS; SUBCUTANEOUS at 22:42

## 2024-06-02 RX ADMIN — INSULIN LISPRO 5 UNITS: 100 INJECTION, SOLUTION INTRAVENOUS; SUBCUTANEOUS at 07:50

## 2024-06-02 RX ADMIN — FAMOTIDINE 20 MG: 20 TABLET ORAL at 09:15

## 2024-06-02 RX ADMIN — INSULIN LISPRO 5 UNITS: 100 INJECTION, SOLUTION INTRAVENOUS; SUBCUTANEOUS at 11:40

## 2024-06-02 RX ADMIN — ATORVASTATIN CALCIUM 20 MG: 20 TABLET, FILM COATED ORAL at 16:46

## 2024-06-02 RX ADMIN — ACETAMINOPHEN 975 MG: 325 TABLET ORAL at 00:45

## 2024-06-02 RX ADMIN — INSULIN LISPRO 3 UNITS: 100 INJECTION, SOLUTION INTRAVENOUS; SUBCUTANEOUS at 16:42

## 2024-06-02 RX ADMIN — INSULIN GLARGINE 15 UNITS: 100 INJECTION, SOLUTION SUBCUTANEOUS at 22:44

## 2024-06-02 RX ADMIN — ALBUTEROL SULFATE 2 PUFF: 90 AEROSOL, METERED RESPIRATORY (INHALATION) at 01:21

## 2024-06-02 NOTE — ASSESSMENT & PLAN NOTE
Lab Results   Component Value Date    CHOLESTEROL 181 05/31/2024    TRIG 209 (H) 05/31/2024    HDL 36 (L) 05/31/2024    LDLCALC 103 (H) 05/31/2024     Lab Results   Component Value Date    LIPASE 94 (H) 05/31/2024    LIPASE 94 04/18/2022     Patient with elevated triglycerides and slightly elevated lipase.   Patient denies significant alcohol use and reports cutting back significantly on his fast food intake.   Denies any abdominal pain, N/V.   CT abdomen pelvis visualizes pancreas unremarkable.     Plan:   Recommend incorporating a more whole foods plant-predominant diet along with decreasing consumption of red meats and processed foods  Per AHA guidelines, recommend moderate-vigorous intensity exercise for 30 minutes a day for 5 days a week or a total of 150 min/week  Lipitor 20 mg daily

## 2024-06-02 NOTE — ASSESSMENT & PLAN NOTE
Lab Results   Component Value Date    HGBA1C 14.5 (H) 05/31/2024       Recent Labs     06/02/24  1638 06/02/24  2239 06/03/24  0759   POCGLU 233* 198* 149*     Pt w/ hx of pre-diabetes and family hx of diabetes presents with 3 weeks of polydipsia, polyuria, and weight loss   Blood glu on arrival 580   Pt not on DM medications PTA   HbA1c 14.5%, initiated started on non-DKA insulin drip,   later transition later transitioned to subcutaneous insulin after the blood sugar levels were under control  Patient was started on Lantus 15 units at bedtime and lispro 5 units 3 times daily with insulin sliding scale and was placed on diabetic diet  Endocrine consulted: Goal blood glu  mg/dl    Plan:   Insulin degludec 15u at bedtime and Novalog 5 units 3 times daily   Sliding scale insulin  Diabetic diet  Lifestyle changes  DM Ab testing [id number] with PCP and endocrinology for the results  Outpatient follow-up with endocrinology  Outpatient follow-up with PCP  Lipitor 20 mg

## 2024-06-02 NOTE — ASSESSMENT & PLAN NOTE
Blood Pressure: 130/73    Patient not on any anti-HTN medication PTA   Blood pressure controlled  Plan:  Monitor blood pressure  Follow-up with PCP outpatient

## 2024-06-02 NOTE — DISCHARGE SUMMARY
[facility]  Discharge- Jean Guerrero 1985, 38 y.o. male MRN: [id number]  Unit/Bed#: W -01 Encounter: [id number]  Primary Care Provider: Alisha Azar MD   Date and time admitted to hospital: 5/31/2024  5:13 PM    * Diabetes (HCC)  Assessment & Plan    Lab Results   Component Value Date    HGBA1C 14.5 (H) 05/31/2024       Recent Labs     06/02/24  1638 06/02/24  2239 06/03/24  0759   POCGLU 233* 198* 149*     Pt w/ hx of pre-diabetes and family hx of diabetes presents with 3 weeks of polydipsia, polyuria, and weight loss   Blood glu on arrival 580   Pt not on DM medications PTA   HbA1c 14.5%, initiated started on non-DKA insulin drip,   later transition later transitioned to subcutaneous insulin after the blood sugar levels were under control  Patient was started on Lantus 15 units at bedtime and lispro 5 units 3 times daily with insulin sliding scale and was placed on diabetic diet  Endocrine consulted: Goal blood glu  mg/dl    Plan:   Insulin degludec 15u at bedtime and Novalog 5 units 3 times daily   Sliding scale insulin  Diabetic diet  Lifestyle changes  DM Ab testing [id number] with PCP and endocrinology for the results  Outpatient follow-up with endocrinology  Outpatient follow-up with PCP  Lipitor 20 mg      Hypertriglyceridemia  Assessment & Plan  Lab Results   Component Value Date    CHOLESTEROL 181 05/31/2024    TRIG 209 (H) 05/31/2024    HDL 36 (L) 05/31/2024    LDLCALC 103 (H) 05/31/2024     Lab Results   Component Value Date    LIPASE 94 (H) 05/31/2024    LIPASE 94 04/18/2022     Patient with elevated triglycerides and slightly elevated lipase.   Patient denies significant alcohol use and reports cutting back significantly on his fast food intake.   Denies any abdominal pain, N/V.   CT abdomen pelvis visualizes pancreas unremarkable.     Plan:   Recommend incorporating a more whole foods plant-predominant diet along with decreasing consumption of  red meats and processed foods  Per AHA guidelines, recommend moderate-vigorous intensity exercise for 30 minutes a day for 5 days a week or a total of 150 min/week  Lipitor 20 mg daily    Asthma  Assessment & Plan  PMH of asthma, with albuterol inhaler PRN.   No wheezing on exam at time of admission.     Plan:   - Claritin   - Albuterol inhaler PRN    Tobacco dependence  Assessment & Plan  Patient uses vape/e-cigarrette daily       Plan:   -Patient has been counseled about tobacco cessation    JOAQUIN (obstructive sleep apnea)  Assessment & Plan  Uses CPAP at night    HTN (hypertension)  Assessment & Plan  Blood Pressure: 130/73    Patient not on any anti-HTN medication PTA   Blood pressure controlled  Plan:  Monitor blood pressure  Follow-up with PCP outpatient          Medical Problems       Resolved Problems  Date Reviewed: 5/31/2024   None       Discharging Resident: Preston Martínez MD  Discharging Attending: No att. providers found  PCP: Alisha Azar MD  Admission Date:   Admission Orders (From admission, onward)       Ordered        05/31/24 2057  INPATIENT ADMISSION  Once                          Discharge Date: 06/03/24    Consultations During Hospital Stay:  Endocrinology    Procedures Performed:   none    Significant Findings / Test Results:   CT abdomen pelvis-no acute abnormality in the abdominal pelvis  HbA1c 14.5    Incidental Findings:   none    Test Results Pending at Discharge (will require follow up):  Zinc transporter antibody  Glutamic acid decarboxylase  Insulin antibody  IA2 autoantibodies  C-peptide     Outpatient Tests Requested:  none    Complications:  none    Reason for Admission: Weakness, weight loss, polyuria and polydipsia    Hospital Course:   Jean Guerrero is a 38 y.o. male patient who originally presented to the hospital on 5/31/2024 due to 3 weeks of polyuria, polydipsia and weight loss along with body soreness and weakness.  In the ED patient's blood glucose  levels were elevated at 580, with no acidosis, normal anion gap.  HbA1c was elevated at 14.5.  Patient was started on non-DKA insulin drip.  After the patient's blood sugar levels came down , patient was transitioned to basal bolus insulin,  insulin Lantus of 15 units at bedtime and lispro 5 units 3 times daily with meals along with sliding scale insulin.  Diabetic education was provided to patient.  Endocrinology was consulted. Patient to continue basal bolus insulin with goal blood glucose of  mg/dl. BG at goal this AM.  At the time of discharge patient is stable and is being discharged home on basal bolus insulin , sliding scale insulin and is recommended to follow-up with endocrinology outpatient.        Please see above list of diagnoses and related plan for additional information.     Condition at Discharge: stable    Discharge Day Visit / Exam:   Subjective: This morning, patient was seen and examined at bedside.  Patient was sitting comfortably in the bed.  Patient reports that he feels fine. Patient not in acute distress.  No overnight events  Vitals: Blood Pressure: 127/74 (06/03/24 0801)  Pulse: 66 (06/03/24 0801)  Temperature: 97.7 °F (36.5 °C) (06/03/24 0801)  Temp Source: Oral (06/02/24 1533)  Respirations: 18 (06/02/24 2228)  Weight - Scale: 84.6 kg (186 lb 8.2 oz) (05/31/24 1647)  SpO2: 96 % (06/03/24 0900)  Exam:   Physical Exam  Constitutional:       Appearance: He is obese.   HENT:      Nose: Nose normal.      Mouth/Throat:      Mouth: Mucous membranes are moist.      Pharynx: Oropharynx is clear.   Eyes:      Extraocular Movements: Extraocular movements intact.      Conjunctiva/sclera: Conjunctivae normal.   Cardiovascular:      Rate and Rhythm: Normal rate and regular rhythm.      Pulses: Normal pulses.      Heart sounds: Normal heart sounds.   Pulmonary:      Effort: Pulmonary effort is normal.      Breath sounds: Normal breath sounds.   Abdominal:      General: Abdomen is flat.       Palpations: Abdomen is soft.   Musculoskeletal:         General: Normal range of motion.      Cervical back: Normal range of motion.   Skin:     General: Skin is warm.      Capillary Refill: Capillary refill takes less than 2 seconds.   Neurological:      Mental Status: He is alert and oriented to person, place, and time. Mental status is at baseline.   Psychiatric:         Mood and Affect: Mood normal.         Behavior: Behavior mumtaz    Discussion with Family: Updated  (wife) at bedside.    Discharge instructions/Information to patient and family:   See after visit summary for information provided to patient and family.      Provisions for Follow-Up Care:  See after visit summary for information related to follow-up care and any pertinent home health orders.      Mobility at time of Discharge:   Basic Mobility Inpatient Raw Score: 24  JH-HLM Goal: 8: Walk 250 feet or more  JH-HLM Achieved: 8: Walk 250 feet ot more  HLM Goal achieved. Continue to encourage appropriate mobility.     Disposition:   Home    Planned Readmission: no    Discharge Medications:  See after visit summary for reconciled discharge medications provided to patient and/or family.      **Please Note: This note may have been constructed using a voice recognition system**

## 2024-06-02 NOTE — PLAN OF CARE
Problem: PAIN - ADULT  Goal: Verbalizes/displays adequate comfort level or baseline comfort level  Description: Interventions:  - Encourage patient to monitor pain and request assistance  - Assess pain using appropriate pain scale  - Administer analgesics based on type and severity of pain and evaluate response  - Implement non-pharmacological measures as appropriate and evaluate response  - Consider cultural and social influences on pain and pain management  - Notify physician/advanced practitioner if interventions unsuccessful or patient reports new pain  Outcome: Progressing     Problem: INFECTION - ADULT  Goal: Absence or prevention of progression during hospitalization  Description: INTERVENTIONS:  - Assess and monitor for signs and symptoms of infection  - Monitor lab/diagnostic results  - Monitor all insertion sites, i.e. indwelling lines, tubes, and drains  - Monitor endotracheal if appropriate and nasal secretions for changes in amount and color  - Conde appropriate cooling/warming therapies per order  - Administer medications as ordered  - Instruct and encourage patient and family to use good hand hygiene technique  - Identify and instruct in appropriate isolation precautions for identified infection/condition  Outcome: Progressing  Goal: Absence of fever/infection during neutropenic period  Description: INTERVENTIONS:  - Monitor WBC    Outcome: Progressing     Problem: SAFETY ADULT  Goal: Patient will remain free of falls  Description: INTERVENTIONS:  - Educate patient/family on patient safety including physical limitations  - Instruct patient to call for assistance with activity   - Consult OT/PT to assist with strengthening/mobility   - Keep Call bell within reach  - Keep bed low and locked with side rails adjusted as appropriate  - Keep care items and personal belongings within reach  - Initiate and maintain comfort rounds  - Make Fall Risk Sign visible to staff  - Offer Toileting every  Hours,  in advance of need  - Initiate/Maintain alarm  - Obtain necessary fall risk management equipment:   - Apply yellow socks and bracelet for high fall risk patients  - Consider moving patient to room near nurses station  Outcome: Progressing  Goal: Maintain or return to baseline ADL function  Description: INTERVENTIONS:  -  Assess patient's ability to carry out ADLs; assess patient's baseline for ADL function and identify physical deficits which impact ability to perform ADLs (bathing, care of mouth/teeth, toileting, grooming, dressing, etc.)  - Assess/evaluate cause of self-care deficits   - Assess range of motion  - Assess patient's mobility; develop plan if impaired  - Assess patient's need for assistive devices and provide as appropriate  - Encourage maximum independence but intervene and supervise when necessary  - Involve family in performance of ADLs  - Assess for home care needs following discharge   - Consider OT consult to assist with ADL evaluation and planning for discharge  - Provide patient education as appropriate  Outcome: Progressing  Goal: Maintains/Returns to pre admission functional level  Description: INTERVENTIONS:  - Perform AM-PAC 6 Click Basic Mobility/ Daily Activity assessment daily.  - Set and communicate daily mobility goal to care team and patient/family/caregiver.   - Collaborate with rehabilitation services on mobility goals if consulted  - Perform Range of Motion  times a day.  - Reposition patient every  hours.  - Dangle patient  times a day  - Stand patient times a day  - Ambulate patient  times a day  - Out of bed to chair  times a day   - Out of bed for meals times a day  - Out of bed for toileting  - Record patient progress and toleration of activity level   Outcome: Progressing     Problem: DISCHARGE PLANNING  Goal: Discharge to home or other facility with appropriate resources  Description: INTERVENTIONS:  - Identify barriers to discharge w/patient and caregiver  - Arrange for  needed discharge resources and transportation as appropriate  - Identify discharge learning needs (meds, wound care, etc.)  - Arrange for interpretive services to assist at discharge as needed  - Refer to Case Management Department for coordinating discharge planning if the patient needs post-hospital services based on physician/advanced practitioner order or complex needs related to functional status, cognitive ability, or social support system  Outcome: Progressing     Problem: Knowledge Deficit  Goal: Patient/family/caregiver demonstrates understanding of disease process, treatment plan, medications, and discharge instructions  Description: Complete learning assessment and assess knowledge base.  Interventions:  - Provide teaching at level of understanding  - Provide teaching via preferred learning methods  Outcome: Progressing

## 2024-06-02 NOTE — ASSESSMENT & PLAN NOTE
Blood Pressure: 130/73    Patient not on any anti-HTN medication PTA   Blood pressure controlled  Plan:  Monitor blood pressure  PRN IV Labetalol and Hydralazine for SBP > 160

## 2024-06-02 NOTE — ASSESSMENT & PLAN NOTE
Patient uses vape/e-cigarrette daily       Plan:   -Patient has been counseled about tobacco cessation

## 2024-06-02 NOTE — CASE MANAGEMENT
Case Management Progress Note    Patient name Jean Guerrero  Location W /W -01 MRN [id number]  : 1985 Date 2024       LOS (days): 2  Geometric Mean LOS (GMLOS) (days):   Days to GMLOS:        OBJECTIVE:        Current admission status: Inpatient  Preferred Pharmacy:   [id number] - [place], [place] - [address] [address] [address] [facility] [address]  Phone: [phone] Fax: [phone]    [id number] - [address] [address]  Phone: [phone] Fax: [phone]    Primary Care Provider: Alisha Azar MD    Primary Insurance: BLUE CROSS  Secondary Insurance:     PROGRESS NOTE:    Cm consulted to check price of new meds for patient. Cm attempted PC to pharmacy. Patients pharmacy was closed.

## 2024-06-02 NOTE — RESPIRATORY THERAPY NOTE
SLIM notified patient is not wearing cpap tonight. Patient would like to bring home machine in for comfort.

## 2024-06-02 NOTE — CONSULTS
Consultation - [facility] Endocrinology    Patient Information: Jean Guerrero 38 y.o. male MRN: [id number]  Unit/Bed#: W -01 Encounter: [id number]  Admitting Physician: Martine Khan MD  PCP: Alisha Azar MD  Date of Consultation:  06/02/24    ASSESSMENT:      PLAN:    Diabetes . TEO vs Type 2. Will treat as Type 1 diabetes for now.  Check antibodies. Use basal bolus insulin to maintain glucose in 70-180mg/dL range and follow up with endocrinology in 6-8 weeks.    Rx sent for dexcom G7 to help monitor glycemia.  As outpt, we may consider transition to oral agents as outpt.    2. HTN. He was on HCTZ that may have exacerbated dehydration and hyperglycemia.    3. Obesity. He reports 30 lb weight loss in 3-4 weeks. Will expect weight gaon with insulin therapy.    Total time spent was 40 min of which >50% was in coordination of care.      Reason for consultation:  New onset diabetes     History of Present Illness:    Jean Guerrero is a 38 y.o. male with Hx prediabetes and HTN. He was admitted with generalized weakness, polyuria, polydipsia, muscle cramps and blurred vision.    He reports worsened symptoms after starting systemic steroids for URI.    Review of Systems:    Review of Systems   Constitutional:  Positive for activity change and appetite change.   HENT: Negative.     Eyes: Negative.    Respiratory: Negative.     Cardiovascular:  Negative for chest pain.   Gastrointestinal: Negative.    Endocrine: Negative.    Genitourinary: Negative.    Musculoskeletal: Negative.    Skin: Negative.    Allergic/Immunologic: Negative.    Neurological: Negative.    Hematological: Negative.    Psychiatric/Behavioral: Negative.     All other systems reviewed and are negative.      Past Medical and Surgical History:     Past Medical History:   Diagnosis Date    Asthma 2005    Bloody stool     Diverticulitis of colon     GERD (gastroesophageal reflux disease)     Headaches, cluster     Hypertension      borderline high, not on meds currently.    Irregular bowel habits     Irregular heart beat     palpitations, unusual chest discomfort. Dr Cheung recommended    Stomach pain        Past Surgical History:   Procedure Laterality Date    COLONOSCOPY      WISDOM TOOTH EXTRACTION         Meds/Allergies:    PTA meds:   Prior to Admission Medications   Prescriptions Last Dose Informant Patient Reported? Taking?   Acetaminophen 160 MG TBDP Past Month Self Yes Yes   Sig: Take 160 mg by mouth if needed for mild pain   Continuous Glucose  (Dexcom G7 ) REGINA   No No   Sig: Use 1 each continuous   Continuous Glucose Sensor (Dexcom G7 Sensor)   No No   Sig: Use 1 Device every 10 days   Omega-3 Fatty Acids (fish oil) 1,000 mg Not Taking Self No No   Sig: Take 1 capsule (1,000 mg total) by mouth 2 (two) times a day   Patient not taking: Reported on 1/5/2024   Red Yeast Rice Extract 600 MG CAPS Past Week  No Yes   Sig: Take 1200mg daily   albuterol (Proventil HFA) 90 mcg/act inhaler Past Month  No Yes   Sig: Inhale 2 puffs every 6 (six) hours as needed for wheezing   aspirin (ECOTRIN LOW STRENGTH) 81 mg EC tablet Not Taking Self Yes No   Sig: Take 81 mg by mouth daily   Patient not taking: Reported on 1/5/2024   cholecalciferol (VITAMIN D3) 1,000 units tablet Not Taking Self Yes No   Sig: Take 1,000 Units by mouth daily Stopped taking but will resume   Patient not taking: Reported on 1/5/2024   famotidine (PEPCID) 20 mg tablet 5/30/2024 Self Yes Yes   Sig: Take 20 mg by mouth daily   loratadine (CLARITIN) 10 mg tablet Past Month  Yes Yes   Sig: Take 10 mg by mouth daily      Facility-Administered Medications: None       Allergies:   Allergies   Allergen Reactions    Bee Venom Anaphylaxis     Reaction Date: 30Jan2006;      History:     Marital Status: /Civil Union   Occupation:   Substance Use History:   Social History     Substance and Sexual Activity   Alcohol Use Yes    Comment: social     Social History      Tobacco Use   Smoking Status Former    Current packs/day: 0.50    Average packs/day: 0.5 packs/day for 15.0 years (7.5 ttl pk-yrs)    Types: Cigarettes    Passive exposure: Never   Smokeless Tobacco Never     Social History     Substance and Sexual Activity   Drug Use Never       Family History:    Family History   Problem Relation Age of Onset    Diabetes type II Mother     Graves' disease Mother     Fibromyalgia Mother     Migraines Mother     Other Mother         Spinal stenosis    Transient ischemic attack Mother     Asthma Mother     Hypertension Mother     Rheum arthritis Sister     Asthma Brother     Hypertension Maternal Grandmother     Colorectal Cancer Maternal Grandmother     Kidney disease Maternal Grandfather     Stroke Maternal Grandfather     Heart attack Maternal Grandfather     Diabetes type II Maternal Grandfather     Diabetes type II Maternal Aunt     Hypertension Maternal Uncle     Diabetes Maternal Uncle        Physical Exam:     Vitals:   Blood Pressure: 130/73 (06/02/24 0737)  Pulse: 58 (06/02/24 0737)  Temperature: 97.5 °F (36.4 °C) (06/02/24 0737)  Temp Source: Oral (05/31/24 1649)  Respirations: 16 (06/02/24 0737)  Weight - Scale: 84.6 kg (186 lb 8.2 oz) (05/31/24 1647)  SpO2: 97 % (06/02/24 0737)    Physical Exam  Constitutional:       General: He is not in acute distress.     Appearance: He is well-developed. He is not ill-appearing.   HENT:      Head: Normocephalic and atraumatic.      Nose: Nose normal.      Mouth/Throat:      Pharynx: Oropharynx is clear.   Eyes:      Extraocular Movements: Extraocular movements intact.      Conjunctiva/sclera: Conjunctivae normal.   Neck:      Thyroid: No thyromegaly.   Cardiovascular:      Rate and Rhythm: Normal rate.   Pulmonary:      Effort: Pulmonary effort is normal.   Musculoskeletal:         General: No deformity.      Cervical back: Normal range of motion.   Skin:     Capillary Refill: Capillary refill takes less than 2 seconds.       Coloration: Skin is not pale.      Findings: No rash.   Neurological:      Mental Status: He is alert and oriented to person, place, and time.   Psychiatric:         Behavior: Behavior normal.           Lab and Imaging Results: I have personally reviewed pertinent films in PACS    Results from last 7 days   Lab Units 06/01/24  0438 05/31/24  1656   WBC Thousand/uL 9.12 5.28   HEMOGLOBIN g/dL 12.9 15.3   HEMATOCRIT % 39.2 46.4   PLATELETS Thousands/uL 246 248   SEGS PCT %  --  45   LYMPHO PCT %  --  46*   MONO PCT %  --  7   EOS PCT %  --  1     Results from last 7 days   Lab Units 06/02/24  0537   POTASSIUM mmol/L 3.7   CHLORIDE mmol/L 105   CO2 mmol/L 27   BUN mg/dL 16   CREATININE mg/dL 0.84   CALCIUM mg/dL 8.1*   ALK PHOS U/L 61   ALT U/L 14   AST U/L 14         POC Glucose (mg/dl)   Date Value   06/02/2024 324 (H)   06/02/2024 189 (H)   06/01/2024 108   06/01/2024 229 (H)   06/01/2024 364 (H)   06/01/2024 203 (H)   06/01/2024 149 (H)   06/01/2024 119   05/31/2024 241 (H)   05/31/2024 434 (HH)         ** Please Note: Dragon 360 Dictation voice to text software may have been used in the creation of this document. **

## 2024-06-02 NOTE — PROGRESS NOTES
[facility]  Progress Note  Name: Jean Guerrero I  MRN: [id number]  Unit/Bed#: W -01 I Date of Admission: 5/31/2024   Date of Service: 6/2/2024 I Hospital Day: 2    Assessment & Plan   * Diabetes (HCC)  Assessment & Plan    Lab Results   Component Value Date    HGBA1C 14.5 (H) 05/31/2024       Recent Labs     06/01/24  1613 06/01/24  2156 06/02/24  0736   POCGLU 229* 108 189*       Pt w/ hx of pre-diabetes and family hx of diabetes presents with 3 weeks of polydipsia, polyuria, and weight loss   Blood glu on arrival 580   Pt not on DM medications PTA   HbA1c 14.5%, initiated started on non-DKA insulin drip, currently on subcutaneous insulin.    Plan:   Continue Lantus 15 at bedtime and Humalog 5 units 3 times daily with insulin sliding scale  Diabetic diet  Goal -180 while admitted, adjusting insulin regimen as appropriate  Monitor for hypoglycemia and treat per protocol  DM Ab testing pending  Endocrinology consulted, appreciate recommendations  Diabetic and insulin education- already provided by the nutrition team  Start atorvastatin 20 mg  Will price check insulin and other diabetes supplies    Hypertriglyceridemia  Assessment & Plan  Lab Results   Component Value Date    CHOLESTEROL 181 05/31/2024    TRIG 209 (H) 05/31/2024    HDL 36 (L) 05/31/2024    LDLCALC 103 (H) 05/31/2024     Lab Results   Component Value Date    LIPASE 94 (H) 05/31/2024    LIPASE 94 04/18/2022     Patient with elevated triglycerides and slightly elevated lipase.   Patient denies significant alcohol use and reports cutting back significantly on his fast food intake.   Denies any abdominal pain, N/V.   CT abdomen pelvis visualizes pancreas unremarkable.     Plan:   Recommend incorporating a more whole foods plant-predominant diet along with decreasing consumption of red meats and processed foods  Per AHA guidelines, recommend moderate-vigorous intensity exercise for 30 minutes a day for 5 days a week  or a total of 150 min/week  Lipitor 20 mg daily    Asthma  Assessment & Plan  PMH of asthma, with albuterol inhaler PRN.   No wheezing on exam at time of admission.     Plan:   - Claritin   - Albuterol inhaler PRN    Tobacco dependence  Assessment & Plan  Patient uses vape/e-cigarrette daily   Declines nicotine replacement therapy on admission     Plan:   - Tobacco cessation at discharge     JOAQUIN (obstructive sleep apnea)  Assessment & Plan  CPAP qhs    HTN (hypertension)  Assessment & Plan  Blood Pressure: 130/73    Patient not on any anti-HTN medication PTA   Blood pressure controlled  Plan:  Monitor blood pressure  PRN IV Labetalol and Hydralazine for SBP > 160                 VTE Pharmacologic Prophylaxis: VTE Score: 1 Low Risk (Score 0-2) - Encourage Ambulation.    Mobility:   Basic Mobility Inpatient Raw Score: 24  JH-HLM Goal: 8: Walk 250 feet or more  JH-HLM Achieved: 8: Walk 250 feet ot more  JH-HLM Goal achieved. Continue to encourage appropriate mobility.    Patient Centered Rounds: I performed bedside rounds with nursing staff today.   Discussions with Specialists or Other Care Team Provider: none, will discuss with endocrinology team    Education and Discussions with Family / Patient: updated wife at bedside    Total Time Spent on Date of Encounter in care of patient: 30 mins. This time was spent on one or more of the following: performing physical exam; counseling and coordination of care; obtaining or reviewing history; documenting in the medical record; reviewing/ordering tests, medications or procedures; communicating with other healthcare professionals and discussing with patient's family/caregivers.    Current Length of Stay: 2 day(s)  Current Patient Status: Inpatient   Certification Statement: The patient will continue to require additional inpatient hospital stay due to diabetes  Discharge Plan: Anticipate discharge tomorrow to home.    Code Status: Level 1 - Full Code    Subjective:   This  Morning, patient was seen and examined at bedside.  Patient was lying comfortably in the bed.  Patient feels completely fine with no weakness, tiredness, polyuria or polyphagia currently.  Patient did not get his Lantus at night.  Patient not in acute distress.    Objective:     Vitals:   Temp (24hrs), Av.9 °F (36.6 °C), Min:97.5 °F (36.4 °C), Max:98.1 °F (36.7 °C)    Temp:  [97.5 °F (36.4 °C)-98.1 °F (36.7 °C)] 97.5 °F (36.4 °C)  HR:  [58-70] 58  Resp:  [16-17] 16  BP: (105-130)/(65-73) 130/73  SpO2:  [97 %-100 %] 97 %  Body mass index is 27.54 kg/m².     Input and Output Summary (last 24 hours):     Intake/Output Summary (Last 24 hours) at 2024 1228  Last data filed at 2024 0914  Gross per 24 hour   Intake 600 ml   Output --   Net 600 ml       Physical Exam:   Physical Exam  Constitutional:       Appearance: He is obese.   HENT:      Nose: Nose normal.      Mouth/Throat:      Mouth: Mucous membranes are moist.      Pharynx: Oropharynx is clear.   Eyes:      Extraocular Movements: Extraocular movements intact.      Conjunctiva/sclera: Conjunctivae normal.   Cardiovascular:      Rate and Rhythm: Normal rate and regular rhythm.      Pulses: Normal pulses.      Heart sounds: Normal heart sounds.   Pulmonary:      Effort: Pulmonary effort is normal.      Breath sounds: Normal breath sounds.   Abdominal:      General: Abdomen is flat.      Palpations: Abdomen is soft.   Musculoskeletal:         General: Normal range of motion.      Cervical back: Normal range of motion.   Skin:     General: Skin is warm.      Capillary Refill: Capillary refill takes less than 2 seconds.   Neurological:      Mental Status: He is alert and oriented to person, place, and time. Mental status is at baseline.   Psychiatric:         Mood and Affect: Mood normal.         Behavior: Behavior normal.          Additional Data:     Labs:  Results from last 7 days   Lab Units 24  0438 24  1656   WBC Thousand/uL 9.12 5.28    HEMOGLOBIN g/dL 12.9 15.3   HEMATOCRIT % 39.2 46.4   PLATELETS Thousands/uL 246 248   SEGS PCT %  --  45   LYMPHO PCT %  --  46*   MONO PCT %  --  7   EOS PCT %  --  1     Results from last 7 days   Lab Units 06/02/24  0537   SODIUM mmol/L 136   POTASSIUM mmol/L 3.7   CHLORIDE mmol/L 105   CO2 mmol/L 27   BUN mg/dL 16   CREATININE mg/dL 0.84   ANION GAP mmol/L 4   CALCIUM mg/dL 8.1*   ALBUMIN g/dL 3.2*   TOTAL BILIRUBIN mg/dL 0.58   ALK PHOS U/L 61   ALT U/L 14   AST U/L 14   GLUCOSE RANDOM mg/dL 196*         Results from last 7 days   Lab Units 06/02/24  1050 06/02/24  0736 06/01/24  2156 06/01/24  1613 06/01/24  1042 06/01/24  0725 06/01/24  0418 06/01/24  0149 05/31/24  2340 05/31/24  2129 05/31/24  1655   POC GLUCOSE mg/dl 324* 189* 108 229* 364* 203* 149* 119 241* 434* 580*     Results from last 7 days   Lab Units 05/31/24  1755   HEMOGLOBIN A1C % 14.5*           Lines/Drains:  Invasive Devices       Peripheral Intravenous Line  Duration             Peripheral IV 05/31/24 Right Antecubital 1 day                          Imaging: Reviewed radiology reports from this admission including: abdominal/pelvic CT    Recent Cultures (last 7 days):         Last 24 Hours Medication List:   Current Facility-Administered Medications   Medication Dose Route Frequency Provider Last Rate    acetaminophen  975 mg Oral Q8H PRN Alice Calabrese MD      albuterol  2 puff Inhalation Q6H PRN Alice Calabrese MD      atorvastatin  20 mg Oral Daily With Dinner Ruben Abdi MD      calcium carbonate  500 mg Oral TID PRN Alice Calabrese MD      famotidine  20 mg Oral Daily Alice Calabrese MD      insulin glargine  15 Units Subcutaneous HS Ruben Abdi MD      insulin lispro  1-6 Units Subcutaneous 4x Daily (AC & HS) Alice Calabrese MD      insulin lispro  5 Units Subcutaneous TID With Meals Ruben Valdez  MD Jin      loratadine  10 mg Oral Daily Alice Calabrese MD      melatonin  3 mg Oral HS PRN Alice Calabrese MD          Today, Patient Was Seen By: Preston Martínez MD    **Please Note: This note may have been constructed using a voice recognition system.**

## 2024-06-02 NOTE — DISCHARGE INSTR - AVS FIRST PAGE
Dear Jean Guerrero,     It was our pleasure to care for you here at [facility].  It is our hope that we were always able to exceed the expected standards for your care during your stay.  You were hospitalized due to ***.  You were cared for on the west 4th floor by Preston Martínez MD under the service of Kimberly Penny MD with the [facility] who covers for your primary care physician (PCP), Alisha Azar MD, while you were hospitalized.  If you have any questions or concerns related to this hospitalization, you may contact us at .  For follow up as well as any medication refills, we recommend that you follow up with your primary care physician.  A registered nurse will reach out to you by phone within a few days after your discharge to answer any additional questions that you may have after going home.  However, at this time we provide for you here, the most important instructions / recommendations at discharge:     Notable Medication Adjustments -   Start taking insulin Degludec- inject 15 units under the skin daily at bedtime  Start taking Lipitor 20 mg by mouth daily   Start taking Insulin Novalog -inject 5 units under the skin 3 times daily with meals  Start using sliding scale insulin with Insulin Novolog, check blood sugar levels three time a day prior to meals and use the following algorithm to correct the blood sugars:  Blood Glucose 150 - 189: 1 Unit of Insulin  Blood Glucose 190 - 229: 2 Units of Insulin  Blood Glucose 230 - 269: 3 Units of Insulin  Blood Glucose 270 - 309: 4 Units of Insulin  Blood Glucose 310 - 349: 5 Units of Insulin  Blood Glucose greater than or equal to 350: 6 Units of Insulin  Stop taking red yeast extract    Testing Required after Discharge -   Finger stick Blood sugar levels 4 times a day before meals and at bedtime   BMP within one week of discharge to be ordered by primary care  physician  Important follow up information -   Make an appointment with your primary care physician within 1 week of discharge as soon as possible  Follow-up with endocrinology Dr Judie Khan  outpatient within 6-8 weeks of discharge  Other Instructions -   Follow-up diabetic diet  Be compliant to medications  In case of worsening symptoms, elevated blood sugars or symptoms like weakness, polyuria, polydipsia contact your primary care physician or return to the ED  Please review this entire after visit summary as additional general instructions including medication list, appointments, activity, diet, any pertinent wound care, and other additional recommendations from your care team that may be provided for you.      Sincerely,     Preston Martínez MD

## 2024-06-02 NOTE — ASSESSMENT & PLAN NOTE
Lab Results   Component Value Date    HGBA1C 14.5 (H) 05/31/2024       Recent Labs     06/01/24  1613 06/01/24  2156 06/02/24  0736   POCGLU 229* 108 189*       Pt w/ hx of pre-diabetes and family hx of diabetes presents with 3 weeks of polydipsia, polyuria, and weight loss   Blood glu on arrival 580   Pt not on DM medications PTA   HbA1c 14.5%, initiated started on non-DKA insulin drip, currently on subcutaneous insulin.    Plan:   Continue Lantus 15 at bedtime and Humalog 5 units 3 times daily with insulin sliding scale  Diabetic diet  Goal -180 while admitted, adjusting insulin regimen as appropriate  Monitor for hypoglycemia and treat per protocol  DM Ab testing pending  Endocrinology consulted, appreciate recommendations  Diabetic and insulin education- already provided by the nutrition team  Start atorvastatin 20 mg  Will price check insulin and other diabetes supplies

## 2024-06-03 ENCOUNTER — TRANSITIONAL CARE MANAGEMENT (OUTPATIENT)
Dept: FAMILY MEDICINE CLINIC | Facility: CLINIC | Age: 39
End: 2024-06-03

## 2024-06-03 VITALS
HEART RATE: 66 BPM | SYSTOLIC BLOOD PRESSURE: 127 MMHG | TEMPERATURE: 97.7 F | WEIGHT: 186.51 LBS | DIASTOLIC BLOOD PRESSURE: 74 MMHG | RESPIRATION RATE: 18 BRPM | BODY MASS INDEX: 27.54 KG/M2 | OXYGEN SATURATION: 96 %

## 2024-06-03 LAB
ANION GAP SERPL CALCULATED.3IONS-SCNC: 5 MMOL/L (ref 4–13)
BUN SERPL-MCNC: 13 MG/DL (ref 5–25)
CALCIUM SERPL-MCNC: 8.2 MG/DL (ref 8.4–10.2)
CHLORIDE SERPL-SCNC: 105 MMOL/L (ref 96–108)
CO2 SERPL-SCNC: 27 MMOL/L (ref 21–32)
CREAT SERPL-MCNC: 0.83 MG/DL (ref 0.6–1.3)
GFR SERPL CREATININE-BSD FRML MDRD: 113 ML/MIN/1.73SQ M
GLUCOSE SERPL-MCNC: 149 MG/DL (ref 65–140)
GLUCOSE SERPL-MCNC: 153 MG/DL (ref 65–140)
GLUCOSE SERPL-MCNC: 251 MG/DL (ref 65–140)
POTASSIUM SERPL-SCNC: 3.6 MMOL/L (ref 3.5–5.3)
SODIUM SERPL-SCNC: 137 MMOL/L (ref 135–147)

## 2024-06-03 PROCEDURE — 84681 ASSAY OF C-PEPTIDE: CPT | Performed by: INTERNAL MEDICINE

## 2024-06-03 PROCEDURE — 80048 BASIC METABOLIC PNL TOTAL CA: CPT

## 2024-06-03 PROCEDURE — 99239 HOSP IP/OBS DSCHRG MGMT >30: CPT | Performed by: INTERNAL MEDICINE

## 2024-06-03 PROCEDURE — 82948 REAGENT STRIP/BLOOD GLUCOSE: CPT

## 2024-06-03 PROCEDURE — 94660 CPAP INITIATION&MGMT: CPT

## 2024-06-03 PROCEDURE — 94760 N-INVAS EAR/PLS OXIMETRY 1: CPT

## 2024-06-03 RX ORDER — BLOOD-GLUCOSE METER
KIT MISCELLANEOUS
Qty: 1 KIT | Refills: 0 | Status: SHIPPED | OUTPATIENT
Start: 2024-06-03

## 2024-06-03 RX ORDER — ATORVASTATIN CALCIUM 20 MG/1
20 TABLET, FILM COATED ORAL
Qty: 30 TABLET | Refills: 0 | Status: SHIPPED | OUTPATIENT
Start: 2024-06-03

## 2024-06-03 RX ORDER — BLOOD SUGAR DIAGNOSTIC
STRIP MISCELLANEOUS
Qty: 100 EACH | Refills: 0 | Status: SHIPPED | OUTPATIENT
Start: 2024-06-03

## 2024-06-03 RX ORDER — HUMAN INSULIN 100 [IU]/ML
1-6 INJECTION, SOLUTION SUBCUTANEOUS
Qty: 10 ML | Refills: 0 | Status: SHIPPED | OUTPATIENT
Start: 2024-06-03

## 2024-06-03 RX ORDER — INSULIN ASPART 100 [IU]/ML
5 INJECTION, SOLUTION INTRAVENOUS; SUBCUTANEOUS
Qty: 15 ML | Refills: 0 | Status: SHIPPED | OUTPATIENT
Start: 2024-06-03 | End: 2024-06-07 | Stop reason: SDUPTHER

## 2024-06-03 RX ADMIN — INSULIN LISPRO 3 UNITS: 100 INJECTION, SOLUTION INTRAVENOUS; SUBCUTANEOUS at 11:41

## 2024-06-03 RX ADMIN — INSULIN LISPRO 5 UNITS: 100 INJECTION, SOLUTION INTRAVENOUS; SUBCUTANEOUS at 11:41

## 2024-06-03 RX ADMIN — FAMOTIDINE 20 MG: 20 TABLET ORAL at 07:56

## 2024-06-03 RX ADMIN — INSULIN LISPRO 5 UNITS: 100 INJECTION, SOLUTION INTRAVENOUS; SUBCUTANEOUS at 07:57

## 2024-06-03 NOTE — CASE MANAGEMENT
Case Management Discharge Planning Note    Patient name Jean Guerrero  Location W /W -01 MRN [id number]  : 1985 Date 6/3/2024       Current Admission Date: 2024  Current Admission Diagnosis:Diabetes (HCC)   Patient Active Problem List    Diagnosis Date Noted Date Diagnosed    Diabetes (HCC) 2024     Asthma 2024     Hypertriglyceridemia 2024     Tobacco dependence 2022     Obesity (BMI 30-39.9) 2022     JOAQUIN (obstructive sleep apnea)      HTN (hypertension) 2022     Elevated BP without diagnosis of hypertension 2021     Dietary counseling and surveillance 2020     Other obesity due to excess calories 2020     Vitamin D deficiency 2020     Encounter for general adult medical examination with abnormal findings 2017     Encounter for screening for respiratory disorder NEC 2017     Headache 2017     Overweight 2017     Pain in unspecified joint 2017     Preglaucoma 2017     Tobacco use 2017       LOS (days): 3  Geometric Mean LOS (GMLOS) (days):   Days to GMLOS:     OBJECTIVE:  Risk of Unplanned Readmission Score: 10.62         Current admission status: Inpatient   Preferred Pharmacy:   [facility]/pharmacy #[id number] - [place], [place] - [address] [address] [address] [facility] [address]  Phone: [phone] Fax: [phone]    [id number] - [address] [address]  Phone: [phone] Fax: [phone]    Primary Care Provider: Alisha Azar MD    Primary Insurance: BLUE CROSS  Secondary Insurance:     DISCHARGE DETAILS:    Other Referral/Resources/Interventions Provided:  Interventions: Prescription Price Check  Referral Comments: CM spoke with [facility] pharmacy to check price of home meds/ insulin. Per pharmacy tech, patients insurance prefers Novalog, instead of Humalog, also stating Dexacom would require a prior  auth. Patient to follow up OP w/ PCP for this, and will return home with a meter, test strips and lancets. All correct meds and DME sent to pharmacy- Novalog; $30.00, all other meds and DME fully covered- SLIM made aware. No further CM needs.    Would you like to participate in our [facility] service program?  : No - Declined    Treatment Team Recommendation: Home  Discharge Destination Plan:: Home

## 2024-06-03 NOTE — PLAN OF CARE
Problem: PAIN - ADULT  Goal: Verbalizes/displays adequate comfort level or baseline comfort level  Description: Interventions:  - Encourage patient to monitor pain and request assistance  - Assess pain using appropriate pain scale  - Administer analgesics based on type and severity of pain and evaluate response  - Implement non-pharmacological measures as appropriate and evaluate response  - Consider cultural and social influences on pain and pain management  - Notify physician/advanced practitioner if interventions unsuccessful or patient reports new pain  Outcome: Progressing     Problem: INFECTION - ADULT  Goal: Absence or prevention of progression during hospitalization  Description: INTERVENTIONS:  - Assess and monitor for signs and symptoms of infection  - Monitor lab/diagnostic results  - Monitor all insertion sites, i.e. indwelling lines, tubes, and drains  - Monitor endotracheal if appropriate and nasal secretions for changes in amount and color  - [place] appropriate cooling/warming therapies per order  - Administer medications as ordered  - Instruct and encourage patient and family to use good hand hygiene technique  - Identify and instruct in appropriate isolation precautions for identified infection/condition  Outcome: Progressing  Goal: Absence of fever/infection during neutropenic period  Description: INTERVENTIONS:  - Monitor WBC    Outcome: Progressing     Problem: SAFETY ADULT  Goal: Patient will remain free of falls  Description: INTERVENTIONS:  - Educate patient/family on patient safety including physical limitations  - Instruct patient to call for assistance with activity   - Consult OT/PT to assist with strengthening/mobility   - Keep Call bell within reach  - Keep bed low and locked with side rails adjusted as appropriate  - Keep care items and personal belongings within reach  - Initiate and maintain comfort rounds  - Make Fall Risk Sign visible to staff  - Offer Toileting every  Hours,  in advance of need  - Initiate/Maintain alarm  - Obtain necessary fall risk management equipment:   - Apply yellow socks and bracelet for high fall risk patients  - Consider moving patient to room near nurses station  Outcome: Progressing  Goal: Maintain or return to baseline ADL function  Description: INTERVENTIONS:  -  Assess patient's ability to carry out ADLs; assess patient's baseline for ADL function and identify physical deficits which impact ability to perform ADLs (bathing, care of mouth/teeth, toileting, grooming, dressing, etc.)  - Assess/evaluate cause of self-care deficits   - Assess range of motion  - Assess patient's mobility; develop plan if impaired  - Assess patient's need for assistive devices and provide as appropriate  - Encourage maximum independence but intervene and supervise when necessary  - Involve family in performance of ADLs  - Assess for home care needs following discharge   - Consider OT consult to assist with ADL evaluation and planning for discharge  - Provide patient education as appropriate  Outcome: Progressing  Goal: Maintains/Returns to pre admission functional level  Description: INTERVENTIONS:  - Perform AM-PAC 6 Click Basic Mobility/ Daily Activity assessment daily.  - Set and communicate daily mobility goal to care team and patient/family/caregiver.   - Collaborate with rehabilitation services on mobility goals if consulted  - Perform Range of Motion times a day.  - Reposition patient every  hours.  - Dangle patient  times a day  - Stand patient  times a day  - Ambulate patient  times a day  - Out of bed to chair times a day   - Out of bed for meals times a day  - Out of bed for toileting  - Record patient progress and toleration of activity level   Outcome: Progressing     Problem: DISCHARGE PLANNING  Goal: Discharge to home or other facility with appropriate resources  Description: INTERVENTIONS:  - Identify barriers to discharge w/patient and caregiver  - Arrange for  needed discharge resources and transportation as appropriate  - Identify discharge learning needs (meds, wound care, etc.)  - Arrange for interpretive services to assist at discharge as needed  - Refer to Case Management Department for coordinating discharge planning if the patient needs post-hospital services based on physician/advanced practitioner order or complex needs related to functional status, cognitive ability, or social support system  Outcome: Progressing     Problem: Knowledge Deficit  Goal: Patient/family/caregiver demonstrates understanding of disease process, treatment plan, medications, and discharge instructions  Description: Complete learning assessment and assess knowledge base.  Interventions:  - Provide teaching at level of understanding  - Provide teaching via preferred learning methods  Outcome: Progressing

## 2024-06-04 ENCOUNTER — TELEPHONE (OUTPATIENT)
Age: 39
End: 2024-06-04

## 2024-06-04 LAB — C PEPTIDE SERPL-MCNC: 0.7 NG/ML (ref 1.1–4.4)

## 2024-06-04 NOTE — TELEPHONE ENCOUNTER
Spoke w/patient he said that he has Rx to test sugar levels.  He will followup with you on 6/7 @ his [facility].

## 2024-06-04 NOTE — UTILIZATION REVIEW
NOTIFICATION OF ADMISSION DISCHARGE   This is a Notification of Discharge from [facility]. Please be advised that this patient has been discharge from our facility. Below you will find the admission and discharge date and time including the patient’s disposition.   UTILIZATION REVIEW CONTACT:  Zuleyka Haas  Utilization   Network Utilization Review Department  Phone: [phone] carefully listen to the prompts. All voicemails are confidential.  Email: [email or web address]     ADMISSION INFORMATION  PRESENTATION DATE: 5/31/2024  5:13 PM  OBERVATION ADMISSION DATE:   INPATIENT ADMISSION DATE: 5/31/24  8:57 PM   DISCHARGE DATE: 6/3/2024  1:07 PM   DISPOSITION:Home/Self Care    Network Utilization Review Department  ATTENTION: Please call with any questions or concerns to [phone] and carefully listen to the prompts so that you are directed to the right person. All voicemails are confidential.   For Discharge needs, contact Care Management DC Support Team at [phone] opt. [phone]  Send all requests for admission clinical reviews, approved or denied determinations and any other requests to dedicated fax number below belonging to the campus where the patient is receiving treatment. List of dedicated fax numbers for the Facilities:  FACILITY NAME UR FAX NUMBER   ADMISSION DENIALS (Administrative/Medical Necessity) [phone]   DISCHARGE SUPPORT TEAM ([facility]) [phone]   PARENT CHILD HEALTH (Maternity/NICU/Pediatrics) [phone]   [facility] [phone]   [facility] [phone]   [facility] [phone]   [facility] [phone]   [facility] [phone]   [facility] [phone]   [facility] [phone]   [facility] [phone]    [facility] [phone]   [facility] [phone]   [facility] [phone]   [facility] [phone]

## 2024-06-04 NOTE — TELEPHONE ENCOUNTER
Pt wife was calling in to check on the status of the Dexcom G7, pt wife stated that the pharmacy fax over forms in regards to getting this prior authorization. Pt wife stated it was for the  and the sensor. Please advise with the pt wife in regards to this prior authorization, thank you.

## 2024-06-05 LAB — GAD65 AB SER-ACNC: <5 U/ML (ref 0–5)

## 2024-06-06 ENCOUNTER — TELEPHONE (OUTPATIENT)
Age: 39
End: 2024-06-06

## 2024-06-06 NOTE — TELEPHONE ENCOUNTER
Patient calling to ask if he should take his fast acting insulin if he BG is 94. Instructed him to keep taking his insulin as long as he is going to eat a meal. He should be taking it before he eats. Patient expressed understanding.

## 2024-06-07 ENCOUNTER — OFFICE VISIT (OUTPATIENT)
Dept: FAMILY MEDICINE CLINIC | Facility: CLINIC | Age: 39
End: 2024-06-07
Payer: COMMERCIAL

## 2024-06-07 VITALS
TEMPERATURE: 98.2 F | DIASTOLIC BLOOD PRESSURE: 70 MMHG | BODY MASS INDEX: 28.2 KG/M2 | WEIGHT: 190.4 LBS | OXYGEN SATURATION: 97 % | SYSTOLIC BLOOD PRESSURE: 114 MMHG | HEIGHT: 69 IN | HEART RATE: 76 BPM | RESPIRATION RATE: 16 BRPM

## 2024-06-07 DIAGNOSIS — E11.9 TYPE 2 DIABETES MELLITUS WITHOUT COMPLICATION, UNSPECIFIED WHETHER LONG TERM INSULIN USE (HCC): ICD-10-CM

## 2024-06-07 DIAGNOSIS — E11.9 DIABETES MELLITUS, NEW ONSET (HCC): Primary | ICD-10-CM

## 2024-06-07 PROCEDURE — 99495 TRANSJ CARE MGMT MOD F2F 14D: CPT | Performed by: FAMILY MEDICINE

## 2024-06-07 RX ORDER — INSULIN ASPART 100 [IU]/ML
INJECTION, SOLUTION INTRAVENOUS; SUBCUTANEOUS
Start: 2024-06-07

## 2024-06-07 NOTE — PROGRESS NOTES
Chief Complaint   Patient presents with   • Transition of Care Management     diabetes     TCM Call     Date and time call was made  6/3/2024  5:26 PM    Hospital care reviewed  Records reviewed    Patient was hospitialized at  [facility] ([facility]    Date of Admission  05/31/24    Date of discharge  06/03/24    Diagnosis  Diabetes    Disposition  Home    Were the patients medications reviewed and updated  Yes    Current Symptoms  None      TCM Call     Post hospital issues  None    Should patient be enrolled in anticoag monitoring?  No    Scheduled for follow up?  Yes    Did you obtain your prescribed medications  Yes    Do you need help managing your prescriptions or medications  No    Is transportation to your appointment needed  Yes    I have advised the patient to call PCP with any new or worsening symptoms  [email or web address]    Living Arrangements  Spouse or Significiant other    Are you recieving any outpatient services  No    Are you recieving home care services  No    Are you using any community resources  No    Current waiver services  No    Have you fallen in the last 12 months  No    Interperter language line needed  No          Patient ID: Jean Guerrero is a 38 y.o. male.    HPI  Pt is seeing for f/u recent hospital stay for new onset of diabetes TEO VS Type 2  -  started on Tresiba and Novolog -  BG in 90-mid 200  -  has an kumar with endo     The following portions of the patient's history were reviewed and updated as appropriate: allergies, current medications, past family history, past medical history, past social history, past surgical history and problem list.    Review of Systems   Constitutional: Negative.    HENT: Negative.     Eyes:  Positive for visual disturbance (blurry vision x 1 m).   Respiratory: Negative.     Cardiovascular: Negative.    Gastrointestinal: Negative.    Genitourinary: Negative.    Musculoskeletal: Negative.    Skin: Negative.    Neurological: Negative.    Psychiatric/Behavioral:  Negative.         Current Outpatient Medications   Medication Sig Dispense Refill   • Acetaminophen 160 MG TBDP Take 160 mg by mouth if needed for mild pain     • albuterol (Proventil HFA) 90 mcg/act inhaler Inhale 2 puffs every 6 (six) hours as needed for wheezing 6.7 g 0   • Alcohol Swabs 70 % PADS May substitute brand based on insurance coverage. Check glucose TID. 100 each 0   • atorvastatin (LIPITOR) 20 mg tablet Take 1 tablet (20 mg total) by mouth daily with dinner 30 tablet 0   • Blood Glucose Monitoring Suppl (OneTouch Verio Reflect) w/Device KIT May substitute brand based on insurance coverage. Check glucose TID. 1 kit 0   • Blood Glucose Monitoring Suppl (OneTouch Verio Reflect) w/Device KIT May substitute brand based on insurance coverage. Check glucose TID. 1 kit 0   • famotidine (PEPCID) 20 mg tablet Take 20 mg by mouth daily     • glucose blood (OneTouch Verio) test strip May substitute brand based on insurance coverage. Check glucose TID. 100 each 0   • insulin aspart (NovoLOG FlexPen) 100 UNIT/ML injection pen Inject 5 Units under the skin 3 (three) times a day with meals 15 mL 0   • insulin degludec (Tresiba FlexTouch) 100 units/mL injection pen Inject 15 Units under the skin daily at bedtime 15 mL 0   • Insulin Pen Needle (BD Pen Needle Shu 2nd Gen) 32G X 4 MM MISC For use with insulin pen. Pharmacy may dispense brand covered by insurance. 100 each 0   • loratadine (CLARITIN) 10 mg tablet Take 10 mg by mouth daily     • OneTouch Delica Lancets 33G MISC May substitute brand based on insurance coverage. Check glucose TID. 100 each 0   • aspirin (ECOTRIN LOW STRENGTH) 81 mg EC tablet Take 81 mg by mouth daily (Patient not taking: Reported on 1/5/2024)     • cholecalciferol (VITAMIN D3) 1,000 units tablet Take 1,000 Units by mouth daily Stopped taking but will resume (Patient not taking: Reported on 1/5/2024)     • Continuous Glucose  (Dexcom G7 ) REGINA Use 1 each continuous (Patient  "not taking: Reported on 6/3/2024) 1 each 0   • Continuous Glucose Sensor (Dexcom G7 Sensor) Use 1 Device every 10 days (Patient not taking: Reported on 6/3/2024) 3 each 2   • glucose blood (OneTouch Verio) test strip May substitute brand based on insurance coverage. Check glucose TID. (Patient not taking: Reported on 6/3/2024) 100 each 0   • Insulin Pen Needle (BD Pen Needle Shu 2nd Gen) 32G X 4 MM MISC For use with insulin pen. Pharmacy may dispense brand covered by insurance. (Patient not taking: Reported on 6/3/2024) 100 each 0   • insulin regular (NovoLIN R) 100 units/mL injection Inject 1-6 Units under the skin 3 (three) times a day with meals Blood Glucose 150 - 189: 1 Unit of Insulin/Blood Glucose 190 - 229: 2 Units of Insulin / Blood Glucose 230 - 269: 3 Units of Insulin / Blood Glucose 270 - 309: 4 Units of Insulin / Blood Glucose 310 - 349: 5 Units of Insulin/ Blood Glucose greater than or equal to 350: 6 Units of Insulin (Patient not taking: Reported on 6/3/2024) 10 mL 0   • Omega-3 Fatty Acids (fish oil) 1,000 mg Take 1 capsule (1,000 mg total) by mouth 2 (two) times a day (Patient not taking: Reported on 1/5/2024) 60 capsule 3     No current facility-administered medications for this visit.       Objective:    /70 (BP Location: Left arm, Patient Position: Sitting, Cuff Size: Large)   Pulse 76   Temp 98.2 °F (36.8 °C) (Temporal)   Resp 16   Ht 5' 9\" (1.753 m)   Wt 86.4 kg (190 lb 6.4 oz)   SpO2 97%   BMI 28.12 kg/m²        Physical Exam  Constitutional:       General: He is not in acute distress.     Appearance: He is not ill-appearing.   Cardiovascular:      Rate and Rhythm: Normal rate and regular rhythm.   Pulmonary:      Effort: Pulmonary effort is normal. No respiratory distress.   Musculoskeletal:      Right lower leg: No edema.      Left lower leg: No edema.   Neurological:      Mental Status: He is alert and oriented to person, place, and time.      Cranial Nerves: No cranial nerve " deficit.      Motor: No weakness.      Gait: Gait normal.   Psychiatric:         Mood and Affect: Mood normal.           Labs in chart were reviewed.      Assessment/Plan:         Diagnoses and all orders for this visit:    Diabetes mellitus, new onset (HCC)  -     insulin aspart (NovoLOG FlexPen) 100 UNIT/ML injection pen; Inject 5 Units under the skin daily with breakfast AND 8 Units daily with lunch AND 10 Units daily with dinner.    Type 2 diabetes mellitus without complication, unspecified whether long term insulin use (HCC)      Was advised to cont Tresiba at 15 U at bedtime and change Novolog to 5-8-10 U with B-L-D   Pt to call in 1 wk with report of BG readings    Has an kumar with endo in 3 wks   Rto johana Azar MD

## 2024-06-07 NOTE — TELEPHONE ENCOUNTER
Pts wife calling in to check the status of the prior auth for his sensor and . Insurance company states they a sent a fax on 6/3. Unable to locate anything in media from insurance company.     Requesting prior auth for the dexcom F7 sensor and  please be faxed to [phone] and requesting it be marked as urgent     Please advise, thank you

## 2024-06-10 ENCOUNTER — NURSE TRIAGE (OUTPATIENT)
Dept: OTHER | Facility: OTHER | Age: 39
End: 2024-06-10

## 2024-06-10 LAB — INSULIN AB SER-ACNC: <5 UU/ML

## 2024-06-10 NOTE — TELEPHONE ENCOUNTER
Pts wife (Tita) says they have been trying to get a prior authorization for pts Dexcom. Is asking if a verbal prior auth can be given to [employer] Auth dept at [phone].     If office needs clarification can reach out to pts wife tomorrow. She will try to keep her phone on her while at work. Pt will be working so it is difficult for him to answer the phone.       Reason for Disposition  • [1] Caller has NON-URGENT medicine question about med that PCP prescribed AND [2] triager unable to answer question    Protocols used: Medication Question Call-ADULT-

## 2024-06-10 NOTE — TELEPHONE ENCOUNTER
"Regarding: Dexcom refill  ----- Message from Anand DYE sent at 6/10/2024  5:35 PM EDT -----  \"My  needs a his dexcom.\"    "

## 2024-06-11 NOTE — TELEPHONE ENCOUNTER
Pt's wife called. She said that pt's Dexcom  was approved but not for the Dexcom sensor. She would need this to be approved. She is asking if a verbal can be given for the sensor. Wife asked for Caroline to call her back regarding this. Thank you.

## 2024-06-11 NOTE — TELEPHONE ENCOUNTER
Patient's wife calling to see if a verbal PA can be done for the patient's dexcom, she has been waiting to hear back for over a week.  NO communication consent on file, she was in the process of calling patient for verbal consent, please call her back to fast track the PA if possible

## 2024-06-13 LAB
ISLET CELL512 AB SER-ACNC: <7.5 U/ML
ZNT8 AB SERPL IA-ACNC: <15 U/ML

## 2024-06-16 PROBLEM — Z13.83 ENCOUNTER FOR SCREENING FOR RESPIRATORY DISORDER NEC: Status: RESOLVED | Noted: 2017-08-23 | Resolved: 2024-06-16

## 2024-06-26 ENCOUNTER — OFFICE VISIT (OUTPATIENT)
Dept: ENDOCRINOLOGY | Facility: CLINIC | Age: 39
End: 2024-06-26
Payer: COMMERCIAL

## 2024-06-26 VITALS
HEIGHT: 69 IN | WEIGHT: 198 LBS | BODY MASS INDEX: 29.33 KG/M2 | OXYGEN SATURATION: 97 % | HEART RATE: 88 BPM | DIASTOLIC BLOOD PRESSURE: 78 MMHG | SYSTOLIC BLOOD PRESSURE: 120 MMHG

## 2024-06-26 DIAGNOSIS — E11.65 TYPE 2 DIABETES MELLITUS WITH HYPERGLYCEMIA, WITH LONG-TERM CURRENT USE OF INSULIN (HCC): Primary | ICD-10-CM

## 2024-06-26 DIAGNOSIS — E78.1 HYPERTRIGLYCERIDEMIA: ICD-10-CM

## 2024-06-26 DIAGNOSIS — I10 PRIMARY HYPERTENSION: ICD-10-CM

## 2024-06-26 DIAGNOSIS — Z79.4 TYPE 2 DIABETES MELLITUS WITH HYPERGLYCEMIA, WITH LONG-TERM CURRENT USE OF INSULIN (HCC): Primary | ICD-10-CM

## 2024-06-26 PROCEDURE — 99215 OFFICE O/P EST HI 40 MIN: CPT | Performed by: PHYSICIAN ASSISTANT

## 2024-06-26 RX ORDER — PEN NEEDLE, DIABETIC 32GX 5/32"
NEEDLE, DISPOSABLE MISCELLANEOUS
Qty: 400 EACH | Refills: 1 | Status: SHIPPED | OUTPATIENT
Start: 2024-06-26

## 2024-06-26 RX ORDER — METFORMIN HYDROCHLORIDE 500 MG/1
500 TABLET, EXTENDED RELEASE ORAL
Qty: 90 TABLET | Refills: 1 | Status: SHIPPED | OUTPATIENT
Start: 2024-06-26

## 2024-06-26 RX ORDER — ACYCLOVIR 400 MG/1
1 TABLET ORAL
Qty: 9 EACH | Refills: 2 | Status: SHIPPED | OUTPATIENT
Start: 2024-06-26

## 2024-06-26 RX ORDER — INSULIN ASPART 100 [IU]/ML
INJECTION, SOLUTION INTRAVENOUS; SUBCUTANEOUS
Qty: 15 ML | Refills: 0 | Status: SHIPPED | OUTPATIENT
Start: 2024-06-26 | End: 2024-06-26 | Stop reason: SDUPTHER

## 2024-06-26 RX ORDER — INSULIN ASPART 100 [IU]/ML
INJECTION, SOLUTION INTRAVENOUS; SUBCUTANEOUS
Qty: 30 ML | Refills: 0 | Status: SHIPPED | OUTPATIENT
Start: 2024-06-26

## 2024-06-26 RX ORDER — INSULIN DEGLUDEC 100 U/ML
15 INJECTION, SOLUTION SUBCUTANEOUS
Qty: 15 ML | Refills: 2 | Status: SHIPPED | OUTPATIENT
Start: 2024-06-26

## 2024-06-26 NOTE — PROGRESS NOTES
Patient Progress Note      CC: DM, hospital   He is here with his wife     Referring Provider  Alisha Azar Md  [address] [place] [address]     History of Present Illness:   Jean Guerrero is a 38 y.o. male with a history of type 2 diabetes with long term use of insulin. Diabetes course has been stable.  Patient was hospitalized in May 2024 for hyperglycemia.  He arrived with a blood glucose of 580 mg/dL.  He has a history of prediabetes and a family history of diabetes.  A few weeks prior to hospitalization he was experiencing polydipsia, polyuria, weight loss.  He was noted to have low C-peptide but insulin antibodies were negative.  Denies any issues with his current regimen. Home glucose monitoring: are performed regularly, using Dexcom.     Only able to see 24 hours of BG levels because Clarity kumar is not working.   BG levels were stead this morning until he had burger sarah for lunch and BG went up to 250 mg/dl.    Current regimen: Tresiba 15 units at bedtime and NovoLog 5-8-10 units 3 times a day with meals  compliant most of the time, denies any side effects from medications.  Injects in: abdomen, arm. Rotates sites: Yes  Hypoglycemic episodes: Yes, infrequent  H/o of hypoglycemia causing hospitalization or Intervention such as glucagon injection  or ambulance call :  No  Hypoglycemia symptoms: jitteriness  Treatment of hypoglycemia: discussed treatment     Medic alert tag: recommended: Yes    Diabetes education: No. He has it scheduled.   Diet: 2-3 meals per day, 2 snacks per day. Timing of meals is predictable.   Diabetic diet compliance:  noncompliant some of the time, but has improved his diet  Activity: Daily activity is predictable: Yes.  No routine exercise .    Ophthamology: N/A  Podiatry: N/A    Not on ACE inhibitor/ARB  Has hyperlipidemia: on statin - tolerating well, no myalgias. compliant most of the time, denies any side effects from medications.  Thyroid disorders:  No  History of pancreatitis: No    Patient Active Problem List   Diagnosis    Elevated BP without diagnosis of hypertension    HTN (hypertension)    JOAQUIN (obstructive sleep apnea)    Tobacco dependence    Obesity (BMI 30-39.9)    Dietary counseling and surveillance    Encounter for general adult medical examination with abnormal findings    Headache    Other obesity due to excess calories    Overweight    Pain in unspecified joint    Preglaucoma    Tobacco use    Vitamin D deficiency    Diabetes (HCC)    Asthma    Hypertriglyceridemia      Past Medical History:   Diagnosis Date    Asthma 2005    Bloody stool     Diabetes mellitus (HCC)     Diverticulitis of colon     GERD (gastroesophageal reflux disease)     Headaches, cluster     Hypertension     borderline high, not on meds currently.    Irregular bowel habits     Irregular heart beat     palpitations, unusual chest discomfort. Dr Cheung recommended    Stomach pain       Past Surgical History:   Procedure Laterality Date    COLONOSCOPY      WISDOM TOOTH EXTRACTION        Family History   Problem Relation Age of Onset    Diabetes type II Mother     Graves' disease Mother     Fibromyalgia Mother     Migraines Mother     Other Mother         Spinal stenosis    Transient ischemic attack Mother     Asthma Mother     Hypertension Mother     Rheum arthritis Sister     Asthma Brother     Hypertension Maternal Grandmother     Colorectal Cancer Maternal Grandmother     Kidney disease Maternal Grandfather     Stroke Maternal Grandfather     Heart attack Maternal Grandfather     Diabetes type II Maternal Grandfather     Diabetes type II Maternal Aunt     Hypertension Maternal Uncle     Diabetes Maternal Uncle      Social History     Tobacco Use    Smoking status: Former     Current packs/day: 0.50     Average packs/day: 0.5 packs/day for 15.0 years (7.5 ttl pk-yrs)     Types: Cigarettes     Passive exposure: Never    Smokeless tobacco: Never   Substance Use Topics    Alcohol  use: Yes     Comment: social     Allergies   Allergen Reactions    Bee Venom Anaphylaxis     Reaction Date: 30Jan2006;          Current Outpatient Medications:     Acetaminophen 160 MG TBDP, Take 160 mg by mouth if needed for mild pain, Disp: , Rfl:     albuterol (Proventil HFA) 90 mcg/act inhaler, Inhale 2 puffs every 6 (six) hours as needed for wheezing, Disp: 6.7 g, Rfl: 0    Alcohol Swabs 70 % PADS, May substitute brand based on insurance coverage. Check glucose TID., Disp: 100 each, Rfl: 0    atorvastatin (LIPITOR) 20 mg tablet, Take 1 tablet (20 mg total) by mouth daily with dinner, Disp: 30 tablet, Rfl: 0    Blood Glucose Monitoring Suppl (OneTouch Verio Reflect) w/Device KIT, May substitute brand based on insurance coverage. Check glucose TID., Disp: 1 kit, Rfl: 0    Continuous Glucose  (Dexcom G7 ) REGINA, Use 1 each continuous, Disp: 1 each, Rfl: 0    Continuous Glucose Sensor (Dexcom G7 Sensor), Use 1 Device every 10 days, Disp: 9 each, Rfl: 2    famotidine (PEPCID) 20 mg tablet, Take 20 mg by mouth daily, Disp: , Rfl:     glucose blood (OneTouch Verio) test strip, May substitute brand based on insurance coverage. Check glucose TID., Disp: 100 each, Rfl: 0    insulin aspart (NovoLOG FlexPen) 100 UNIT/ML injection pen, Inject 5 Units under the skin daily with breakfast AND 6 Units daily with lunch AND 8 Units daily with dinner., Disp: 30 mL, Rfl: 0    insulin degludec (Tresiba FlexTouch) 100 units/mL injection pen, Inject 15 Units under the skin daily at bedtime, Disp: 15 mL, Rfl: 2    Insulin Pen Needle (BD Pen Needle Shu 2nd Gen) 32G X 4 MM MISC, For use with insulin pen. Pharmacy may dispense brand covered by insurance., Disp: 400 each, Rfl: 1    loratadine (CLARITIN) 10 mg tablet, Take 10 mg by mouth daily, Disp: , Rfl:     metFORMIN (GLUCOPHAGE-XR) 500 mg 24 hr tablet, Take 1 tablet (500 mg total) by mouth daily with dinner, Disp: 90 tablet, Rfl: 1    OneTouch Delica Lancets 33G MISC,  "May substitute brand based on insurance coverage. Check glucose TID., Disp: 100 each, Rfl: 0    aspirin (ECOTRIN LOW STRENGTH) 81 mg EC tablet, Take 81 mg by mouth daily (Patient not taking: Reported on 1/5/2024), Disp: , Rfl:     Blood Glucose Monitoring Suppl (OneTouch Verio Reflect) w/Device KIT, May substitute brand based on insurance coverage. Check glucose TID. (Patient not taking: Reported on 6/26/2024), Disp: 1 kit, Rfl: 0    cholecalciferol (VITAMIN D3) 1,000 units tablet, Take 1,000 Units by mouth daily Stopped taking but will resume (Patient not taking: Reported on 1/5/2024), Disp: , Rfl:     glucose blood (OneTouch Verio) test strip, May substitute brand based on insurance coverage. Check glucose TID. (Patient not taking: Reported on 6/3/2024), Disp: 100 each, Rfl: 0    Insulin Pen Needle (BD Pen Needle Shu 2nd Gen) 32G X 4 MM MISC, For use with insulin pen. Pharmacy may dispense brand covered by insurance. (Patient not taking: Reported on 6/3/2024), Disp: 100 each, Rfl: 0    Omega-3 Fatty Acids (fish oil) 1,000 mg, Take 1 capsule (1,000 mg total) by mouth 2 (two) times a day (Patient not taking: Reported on 1/5/2024), Disp: 60 capsule, Rfl: 3  Review of Systems   Constitutional:  Negative for activity change, appetite change, fatigue (improved) and unexpected weight change.   HENT:  Negative for trouble swallowing.    Eyes:  Positive for visual disturbance (occasional).   Respiratory:  Negative for shortness of breath.    Cardiovascular:  Negative for chest pain and palpitations.   Gastrointestinal:  Positive for diarrhea (occasional). Negative for constipation.   Endocrine: Negative for polydipsia and polyuria.   Musculoskeletal: Negative.    Skin:  Negative for wound.   Neurological:  Negative for numbness.   Psychiatric/Behavioral: Negative.         Physical Exam:  Body mass index is 29.24 kg/m².  /78 (BP Location: Left arm, Patient Position: Sitting, Cuff Size: Large)   Pulse 88   Ht 5' 9\" " "(1.753 m)   Wt 89.8 kg (198 lb)   SpO2 97%   BMI 29.24 kg/m²    Wt Readings from Last 3 Encounters:   06/26/24 89.8 kg (198 lb)   06/07/24 86.4 kg (190 lb 6.4 oz)   05/31/24 84.6 kg (186 lb 8.2 oz)       Physical Exam  Vitals and nursing note reviewed.   Constitutional:       Appearance: He is well-developed.   HENT:      Head: Normocephalic.   Eyes:      General: No scleral icterus.     Extraocular Movements: EOM normal.   Neck:      Thyroid: No thyromegaly.   Cardiovascular:      Rate and Rhythm: Normal rate and regular rhythm.      Pulses:           Radial pulses are 2+ on the right side and 2+ on the left side.      Heart sounds: No murmur heard.  Pulmonary:      Effort: Pulmonary effort is normal. No respiratory distress.      Breath sounds: Normal breath sounds. No wheezing.   Musculoskeletal:      Cervical back: Neck supple.   Skin:     General: Skin is warm and dry.   Neurological:      Mental Status: He is alert.   Psychiatric:         Mood and Affect: Mood and affect normal.     Patient's shoes and socks were not removed.        Labs:   Lab Results   Component Value Date    HGBA1C 14.5 (H) 05/31/2024     Lab Results   Component Value Date    CALCIUM 8.2 (L) 06/03/2024    K 3.6 06/03/2024    CO2 27 06/03/2024     06/03/2024    BUN 13 06/03/2024    CREATININE 0.83 06/03/2024     No results found for: \"MICROALBUR\", \"CMID31PVF\"  eGFR   Date Value Ref Range Status   06/03/2024 113 ml/min/1.73sq m Final     No components found for: \"MALBCRER\"  Lab Results   Component Value Date    HDL 36 (L) 05/31/2024    TRIG 209 (H) 05/31/2024     Lab Results   Component Value Date    ALT 14 06/02/2024    AST 14 06/02/2024    ALKPHOS 61 06/02/2024     Lab Results   Component Value Date    LUS0ZIRWHXJR 0.930 05/31/2024    TSH 0.908 09/30/2021           Plan:    Diagnoses and all orders for this visit:    Type 2 diabetes mellitus with hyperglycemia, with long-term current use of insulin (HCC)  HGA1C 14.5%. " Worsened.  Treatment regimen: Patient reports having to eat more than he wants to keep his BG levels up after lunch and BG levels drop after dinner. Decrease Novolog to 5-6-8 units TID with meals. Start 500 mg of metformin with dinner. Discussed SE, safety/efficacy. He will call Dexcom to resolve the issue he is having with the Clarity kumar. Download CGM in 1-2 weeks. Discussed dietary changes. Recommended MNT.   Discussed intensive insulin regimen does increase risk for hypoglycemia. Episodes of hypoglycemia can lead to permanent disability and death.  Discussed risks/complications associated with uncontrolled diabetes.    Advised to adhere to diabetic diet, and recommended staying active/exercising routinely as tolerated.  Keep carbohydrates consistent to limit blood glucose fluctuations.  Advised to call if blood sugars less than 70 mg/dl or over 300 mg/dl.   Check blood glucose 3+ times a day  Discussed symptoms and treatment of hypoglycemia.   Discussed use of CGM to collect additional blood glucose data to reveal trends and patterns that can be used to optimize treatment plan.   Referred to diabetes/nutrition education.   Recommended routine follow-up with podiatry and ophthalmology.   Send log in 1-2 weeks.    Ordered blood work to complete prior to next visit.  -     Discontinue: insulin aspart (NovoLOG FlexPen) 100 UNIT/ML injection pen; Inject 5 Units under the skin daily with breakfast AND 6 Units daily with lunch AND 8 Units daily with dinner.  -     metFORMIN (GLUCOPHAGE-XR) 500 mg 24 hr tablet; Take 1 tablet (500 mg total) by mouth daily with dinner  -     Continuous Glucose Sensor (Dexcom G7 Sensor); Use 1 Device every 10 days  -     insulin degludec (Tresiba FlexTouch) 100 units/mL injection pen; Inject 15 Units under the skin daily at bedtime  -     insulin aspart (NovoLOG FlexPen) 100 UNIT/ML injection pen; Inject 5 Units under the skin daily with breakfast AND 6 Units daily with lunch AND 8 Units  daily with dinner.  -     Insulin Pen Needle (BD Pen Needle Shu 2nd Gen) 32G X 4 MM MISC; For use with insulin pen. Pharmacy may dispense brand covered by insurance.  -     Hemoglobin A1C; Future  -     Albumin / creatinine urine ratio; Future  -     Basic metabolic panel; Future  -     C-peptide; Future    Primary hypertension  Blood pressure controlled    Hypertriglyceridemia  Continue statin therapy  Reduce intake of saturated fats      Discussed with the patient diagnosis and treatment and all questions fully answered. He will call me if any problems arise.  I have spent a total time of 60 minutes on 06/27/24 in caring for this patient including Diagnostic results, Risks and benefits of tx options, Instructions for management, Patient and family education, Importance of tx compliance, Risk factor reductions, and Impressions.  Counseled patient on diagnostic results, prognosis, risk and benefit of treatment options, instruction for management, importance of treatment compliance, risk factor reduction and impressions.      Sobia Munoz PA-C

## 2024-06-26 NOTE — PATIENT INSTRUCTIONS
Hypoglycemia instructions   Jean [facility]  6/26/2024  [id number]    Low Blood Sugar    Steps to treat low blood sugar.    1. Test blood sugar if you have symptoms of low blood sugar:   Low Blood Sugar Symptoms:  o Sweaty  o Dizzy  o Rapid heartbeat  o Shaky  o Bad mood  o Hungry      2. Treat blood sugar less than 70 with 15 grams of fast-acting carbohydrate:   Examples of 15 grams Fast-Acting Carbohydrate:  o 4 oz juice  o 4 oz regular soda  o 3-4 glucose tablets (chew)  o 3-4 hard candies (chew)          3.  Wait 15 minutes and test your blood sugar again     4. If blood sugar is less than 100, repeat steps 2-3.    5. When your blood sugar is 100 or more, eat a snack if it will be longer than one hour until your next meal. The snack should be 15 grams of carbohydrate and a protein:   Examples of snacks:  o ½ sandwich  o 6 crackers with cheese  o Piece of fruit with cheese or peanut butter  o 6 crackers with peanut butter

## 2024-07-18 ENCOUNTER — OFFICE VISIT (OUTPATIENT)
Dept: DIABETES SERVICES | Facility: CLINIC | Age: 39
End: 2024-07-18
Payer: COMMERCIAL

## 2024-07-18 VITALS — WEIGHT: 200 LBS | BODY MASS INDEX: 29.53 KG/M2

## 2024-07-18 DIAGNOSIS — E11.9 TYPE 2 DIABETES MELLITUS WITHOUT COMPLICATION, UNSPECIFIED WHETHER LONG TERM INSULIN USE (HCC): Primary | ICD-10-CM

## 2024-07-18 PROCEDURE — 97802 MEDICAL NUTRITION INDIV IN: CPT

## 2024-07-18 NOTE — PROGRESS NOTES
"Medical Nutrition Therapy    Assessment    Visit Type: Initial visit  Chief complaint/Medical Diagnosis/reason for visit: E11.9 (ICD-10-CM) - Diabetes (HCC)     HPI Jean Guerrero was seen today unaccompanied regarding new onset diabetes. Patient is currently taking 500 mg of metformin daily with dinner, 15 units of Tresiba daily and Novolog with meals; 5 units with breakfast, 6 with lunch and 8 units with dinner. Last HbA1c was 14.5% in May 2024. Currently using Dexcom CGM. Conducted dietary recall. Problems identified in food recall include inconsistent carbohydrate intake. Explained basic pathophysiology of diabetes and impact of diet on blood glucose levels. Together we discussed what foods contain carbohydrates, reading a food label, timing of meals and snacks, serving sizes, the role of fiber, the importance of consistent carbohydrate intake in the appropriate amounts. Used the portion booklet to teach Jean more about food groups and basic carbohydrate counting. Encouraged 3 regular meals ~4-5 hours apart with 1-2 snacks per day as needed. Discussed keeping carbohydrate intake consistent. Goal is 45-60 grams of carbohydrate per meal and 0-15 grams of carbohydrate per snack. Jean demonstrated good understanding and will call with any questions or if he would like to schedule a follow-up visit.    Ht Readings from Last 1 Encounters:   06/26/24 5' 9\" (1.753 m)     Wt Readings from Last 3 Encounters:   07/18/24 90.7 kg (200 lb)   06/26/24 89.8 kg (198 lb)   06/07/24 86.4 kg (190 lb 6.4 oz)     Weight Change: Patient reports of unintentional weight loss prior to diabetes diagnosis. States he has since gained some of the weight back. Lowest weight over the past year was 185 pounds per EMR review. Weighed today in the office at 200 pounds.    Barriers to Learning: no barriers    Do you follow any special diet presently?: No, but has been reducing his portions and making healthier food choices since DM " diagnosis  Who shops: patient and spouse  Who cooks: patient and spouse    Food Log: Completed via the method of usual daily food recall    Breakfast: may just have a Premier protein shake and a banana or eggs, coffee with light cream or zero sugar creamer  Morning Snack: none typically  Lunch: packs a sandwich with 647 bread with ham or turkey or chicken and cheese with cheez-its or granola or pudding or crackers or a cheese stick  Afternoon Snack: whatever he does not eat in his lunch  Dinner: proteins - chicken, beef, pork, starches - rice, pasta, potatoes, chicken carson or spaghetti with meat sauce, vegetables - green beans and broccoli  Evening Snack: sometimes  Beverages: water, gatorade zero, water with [place] or [place], coffee with light cream or zero sugar creamer, Premier protein shake  Eating out/Take out: not a lot of fast food, may get a sub from a pizzeria or a pasta dish or chinese food  Exercise: no regular physical activity at present, 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    Intervention: increased fiber intake, label reading, behavior modification strategies, carbohydrate counting, meal timing, meal planning, and monitoring portion control     Treatment Goals: Patient understands education and recommendations, Patient will consume 3 meals a day, Patient will monitor portion control, Patient will consume snacks, Patient will count carbohydrates, and Patient will monitor blood glucose    Monitoring and evaluation:    Term code indicator  FH 4.4 Mealtime Behavior  Criteria: Eat 3 regular meals ~4-5 hours apart with 1-2 snacks per day as needed.  Term code indicator  FH 1.6.3 Carbohydrate Intake Criteria: Keep carbohydrate intake consistent. Aim for 45-60 grams of carbohydrate per meal and 0-15 grams of carbohydrate per snack.    Materials Provided: Portion booklet and 15/15 rule handout for hypoglycemia treatment    Patient’s Response to Instruction:  Comprehension: good  Motivation: good  Expected Compliance: good    Start- Stop: 9:43-10:53  Total Minutes: 70 Minutes  Group or Individual Instruction: MNT-I  Other: Preston Martínez MD    Thank you for coming to the [facility] for education today. Please feel free to call with any questions or concerns.    Izabella Abdi, MS, RD, LDN  [address]  [address]  [place] [phone]

## 2024-07-20 ENCOUNTER — OFFICE VISIT (OUTPATIENT)
Dept: URGENT CARE | Facility: CLINIC | Age: 39
End: 2024-07-20
Payer: COMMERCIAL

## 2024-07-20 VITALS
WEIGHT: 199 LBS | OXYGEN SATURATION: 98 % | RESPIRATION RATE: 16 BRPM | DIASTOLIC BLOOD PRESSURE: 86 MMHG | BODY MASS INDEX: 29.47 KG/M2 | HEART RATE: 78 BPM | HEIGHT: 69 IN | SYSTOLIC BLOOD PRESSURE: 130 MMHG | TEMPERATURE: 98.1 F

## 2024-07-20 DIAGNOSIS — R21 RASH: Primary | ICD-10-CM

## 2024-07-20 PROCEDURE — 99213 OFFICE O/P EST LOW 20 MIN: CPT

## 2024-07-20 NOTE — PROGRESS NOTES
[facility]        NAME: Jean Guerrero is a 39 y.o. male  : 1985    MRN: [id number]  DATE: 2024  TIME: 2:15 PM    Assessment and Plan   Rash [R21]  1. Rash  hydrocortisone 2.5 % cream      Discontinue Neosporin application, begin hydrocortisone cream as directed.   Follow up with PCP if no relief within one week.       Patient Instructions     Patient Education     Skin Rash ED   General Information   You came to the Emergency Department (ED) for a skin rash. The medical name for this is dermatitis. Many things can cause your rash. You may have a rash if something is irritating your skin. An allergy can cause a rash and so can plants, soaps, and some kinds of metal. The doctors may not know what is causing your rash.  What care is needed at home?   Call your regular doctor to let them know you were in the ED. Make a follow-up appointment if you were told to.  Use an unscented cream or lotion to keep your skin moist.  Drink plenty of fluids to keep your body hydrated.  Bathe with cool or warm water. Do not use hot water. Pat yourself dry with a clean, thick, soft towel. Use mild and unscented soap, moisturizers, and deodorants.  At-home care to help with scratching:  Wear gloves to protect skin on your hands. Try wearing cotton gloves under plastic gloves. Remove both sets of gloves from time to time to prevent sweating.  Keep nails short and clean.  If you scratch in your sleep, wear white cotton gloves to bed.  Try using cool compresses on the skin. They may help with swelling and itching. Dip a cloth in cold water and put it right on your itchy skin.  When do I need to get emergency help?   Call for an ambulance right away if:   You start to have severe trouble breathing or swallowing (for example, you cannot speak in full sentences).  Return to the ED if:   The rash spreads over large parts of your body and most of your skin becomes red.  It is becoming hard to breathe, but you can still  talk in full sentences.  When do I need to call the doctor?   You have a fever of 100.4°F (38°C) or higher or chills.  You have signs of a wound infection like swelling, redness, warmth, pain, or drainage from the wound.  You have new or worsening symptoms.  Last Reviewed Date   2021-09-09  Consumer Information Use and Disclaimer   This generalized information is a limited summary of diagnosis, treatment, and/or medication information. It is not meant to be comprehensive and should be used as a tool to help the user understand and/or assess potential diagnostic and treatment options. It does NOT include all information about conditions, treatments, medications, side effects, or risks that may apply to a specific patient. It is not intended to be medical advice or a substitute for the medical advice, diagnosis, or treatment of a health care provider based on the health care provider's examination and assessment of a patient’s specific and unique circumstances. Patients must speak with a health care provider for complete information about their health, medical questions, and treatment options, including any risks or benefits regarding use of medications. This information does not endorse any treatments or medications as safe, effective, or approved for treating a specific patient. [employer] and its affiliates disclaim any warranty or liability relating to this information or the use thereof. The use of this information is governed by the Terms of Use, available at https://www.[employer].com/en/know/clinical-effectiveness-terms   Copyright   Copyright © 2024 [employer] and its affiliates and/or licensors. All rights reserved.          Follow up with PCP in 3-5 days.  Proceed to  ER if symptoms worsen.    Chief Complaint     Chief Complaint   Patient presents with    Wound Check     Pt states he saw a small scrape on his right forearm about 2 weeks ago. It started to scab and became swollen. Now there is a  lump and it itches. Pt is using neosporin.          History of Present Illness       39 year old male with PMH significant for diabetes mellitus, asthma, HTN, presents for evaluation of lesion of right forearm. He reports that he sustained a small laceration in the area two weeks ago at work. The cut seemed to heal with Neosporin, but now the area is raised and pruritic. He has also noted some smaller satellite lesions of the wrist/forearm. He denies drainage, warmth, fever or tenderness.     Wound Check        Review of Systems   Review of Systems   Constitutional:  Negative for fatigue and fever.   HENT:  Negative for congestion, ear discharge, ear pain, postnasal drip, rhinorrhea, sinus pressure, sinus pain, sneezing and sore throat.    Eyes: Negative.  Negative for pain, discharge, redness and itching.   Respiratory: Negative.  Negative for apnea, cough, choking, chest tightness, shortness of breath, wheezing and stridor.    Cardiovascular: Negative.  Negative for chest pain and palpitations.   Gastrointestinal: Negative.  Negative for diarrhea, nausea and vomiting.   Endocrine: Negative.  Negative for polydipsia, polyphagia and polyuria.   Genitourinary: Negative.  Negative for decreased urine volume and flank pain.   Musculoskeletal: Negative.  Negative for arthralgias, back pain, myalgias, neck pain and neck stiffness.   Skin:  Positive for rash. Negative for color change, pallor and wound.   Allergic/Immunologic: Negative.  Negative for environmental allergies.   Neurological: Negative.  Negative for dizziness, facial asymmetry, light-headedness, numbness and headaches.   Hematological: Negative.  Negative for adenopathy.   Psychiatric/Behavioral: Negative.           Current Medications       Current Outpatient Medications:     Acetaminophen 160 MG TBDP, Take 160 mg by mouth if needed for mild pain, Disp: , Rfl:     albuterol (Proventil HFA) 90 mcg/act inhaler, Inhale 2 puffs every 6 (six) hours as needed  for wheezing, Disp: 6.7 g, Rfl: 0    Alcohol Swabs 70 % PADS, May substitute brand based on insurance coverage. Check glucose TID., Disp: 100 each, Rfl: 0    aspirin (ECOTRIN LOW STRENGTH) 81 mg EC tablet, Take 81 mg by mouth daily, Disp: , Rfl:     atorvastatin (LIPITOR) 20 mg tablet, Take 1 tablet (20 mg total) by mouth daily with dinner, Disp: 30 tablet, Rfl: 0    Blood Glucose Monitoring Suppl (OneTouch Verio Reflect) w/Device KIT, May substitute brand based on insurance coverage. Check glucose TID., Disp: 1 kit, Rfl: 0    Blood Glucose Monitoring Suppl (OneTouch Verio Reflect) w/Device KIT, May substitute brand based on insurance coverage. Check glucose TID., Disp: 1 kit, Rfl: 0    Continuous Glucose  (Dexcom G7 ) REGINA, Use 1 each continuous, Disp: 1 each, Rfl: 0    Continuous Glucose Sensor (Dexcom G7 Sensor), Use 1 Device every 10 days, Disp: 9 each, Rfl: 2    famotidine (PEPCID) 20 mg tablet, Take 20 mg by mouth daily, Disp: , Rfl:     glucose blood (OneTouch Verio) test strip, May substitute brand based on insurance coverage. Check glucose TID., Disp: 100 each, Rfl: 0    glucose blood (OneTouch Verio) test strip, May substitute brand based on insurance coverage. Check glucose TID., Disp: 100 each, Rfl: 0    hydrocortisone 2.5 % cream, Apply topically 2 (two) times a day, Disp: 20 g, Rfl: 0    insulin aspart (NovoLOG FlexPen) 100 UNIT/ML injection pen, Inject 5 Units under the skin daily with breakfast AND 6 Units daily with lunch AND 8 Units daily with dinner., Disp: 30 mL, Rfl: 0    insulin degludec (Tresiba FlexTouch) 100 units/mL injection pen, Inject 15 Units under the skin daily at bedtime, Disp: 15 mL, Rfl: 2    Insulin Pen Needle (BD Pen Needle Shu 2nd Gen) 32G X 4 MM MISC, For use with insulin pen. Pharmacy may dispense brand covered by insurance., Disp: 100 each, Rfl: 0    Insulin Pen Needle (BD Pen Needle Shu 2nd Gen) 32G X 4 MM MISC, For use with insulin pen. Pharmacy may  dispense brand covered by insurance., Disp: 400 each, Rfl: 1    loratadine (CLARITIN) 10 mg tablet, Take 10 mg by mouth daily, Disp: , Rfl:     metFORMIN (GLUCOPHAGE-XR) 500 mg 24 hr tablet, Take 1 tablet (500 mg total) by mouth daily with dinner, Disp: 90 tablet, Rfl: 1    OneTouch Delica Lancets 33G MISC, May substitute brand based on insurance coverage. Check glucose TID., Disp: 100 each, Rfl: 0    cholecalciferol (VITAMIN D3) 1,000 units tablet, Take 1,000 Units by mouth daily Stopped taking but will resume (Patient not taking: Reported on 7/20/2024), Disp: , Rfl:     Omega-3 Fatty Acids (fish oil) 1,000 mg, Take 1 capsule (1,000 mg total) by mouth 2 (two) times a day (Patient not taking: Reported on 1/5/2024), Disp: 60 capsule, Rfl: 3    Current Allergies     Allergies as of 07/20/2024 - Reviewed 07/20/2024   Allergen Reaction Noted    Bee venom Anaphylaxis 01/30/2006            The following portions of the patient's history were reviewed and updated as appropriate: allergies, current medications, past family history, past medical history, past social history, past surgical history and problem list.     Past Medical History:   Diagnosis Date    Asthma 2005    Bloody stool     Diabetes mellitus (HCC)     Diverticulitis of colon     GERD (gastroesophageal reflux disease)     Headaches, cluster     Hypertension     borderline high, not on meds currently.    Irregular bowel habits     Irregular heart beat     palpitations, unusual chest discomfort. Dr Cheung recommended    Stomach pain        Past Surgical History:   Procedure Laterality Date    COLONOSCOPY      WISDOM TOOTH EXTRACTION         Family History   Problem Relation Age of Onset    Diabetes type II Mother     Graves' disease Mother     Fibromyalgia Mother     Migraines Mother     Other Mother         Spinal stenosis    Transient ischemic attack Mother     Asthma Mother     Hypertension Mother     Rheum arthritis Sister     Asthma Brother      "Hypertension Maternal Grandmother     Colorectal Cancer Maternal Grandmother     Kidney disease Maternal Grandfather     Stroke Maternal Grandfather     Heart attack Maternal Grandfather     Diabetes type II Maternal Grandfather     Diabetes type II Maternal Aunt     Hypertension Maternal Uncle     Diabetes Maternal Uncle          Medications have been verified.        Objective   /86   Pulse 78   Temp 98.1 °F (36.7 °C)   Resp 16   Ht 5' 9\" (1.753 m)   Wt 90.3 kg (199 lb)   SpO2 98%   BMI 29.39 kg/m²        Physical Exam     Physical Exam  Vitals reviewed.   Constitutional:       General: He is not in acute distress.     Appearance: Normal appearance. He is not ill-appearing, toxic-appearing or diaphoretic.      Interventions: He is not intubated.  HENT:      Head: Normocephalic and atraumatic.      Right Ear: Tympanic membrane, ear canal and external ear normal. There is no impacted cerumen.      Left Ear: Tympanic membrane, ear canal and external ear normal. There is no impacted cerumen.      Nose: Nose normal. No congestion or rhinorrhea.      Mouth/Throat:      Mouth: Mucous membranes are moist.      Pharynx: Oropharynx is clear. Uvula midline. No pharyngeal swelling, oropharyngeal exudate, posterior oropharyngeal erythema or uvula swelling.      Tonsils: No tonsillar exudate or tonsillar abscesses. 1+ on the right. 1+ on the left.   Eyes:      Extraocular Movements: Extraocular movements intact.      Conjunctiva/sclera: Conjunctivae normal.      Pupils: Pupils are equal, round, and reactive to light.   Cardiovascular:      Rate and Rhythm: Normal rate and regular rhythm.      Pulses: Normal pulses.      Heart sounds: Normal heart sounds, S1 normal and S2 normal. Heart sounds not distant. No murmur heard.     No friction rub. No gallop.   Pulmonary:      Effort: Pulmonary effort is normal. No tachypnea, bradypnea, accessory muscle usage, prolonged expiration, respiratory distress or retractions. He " is not intubated.      Breath sounds: Normal breath sounds. No stridor, decreased air movement or transmitted upper airway sounds. No decreased breath sounds, wheezing, rhonchi or rales.   Chest:      Chest wall: No tenderness.   Musculoskeletal:         General: Normal range of motion.      Cervical back: Normal range of motion and neck supple. No rigidity or tenderness.   Lymphadenopathy:      Cervical: No cervical adenopathy.   Skin:     General: Skin is warm and dry.      Capillary Refill: Capillary refill takes less than 2 seconds.      Findings: Rash present. No erythema. Rash is papular. Rash is not macular.             Comments: Circular, raised lesion of anterior lower right forearm, smaller papular lesions nearby. No drainage, warmth, flaking.    Neurological:      General: No focal deficit present.      Mental Status: He is alert.   Psychiatric:         Mood and Affect: Mood normal.

## 2024-08-31 ENCOUNTER — TELEPHONE (OUTPATIENT)
Dept: OTHER | Facility: OTHER | Age: 39
End: 2024-08-31

## 2024-08-31 NOTE — TELEPHONE ENCOUNTER
Patient called today regarding he was told to remind the Provider to Check the Numbers on his [employer]. Please call patient back to discuss.

## 2024-09-04 ENCOUNTER — TELEPHONE (OUTPATIENT)
Dept: ENDOCRINOLOGY | Facility: CLINIC | Age: 39
End: 2024-09-04

## 2024-09-04 NOTE — TELEPHONE ENCOUNTER
Average glucose is 117 mg/dl which is great!  He is in target range 96% of the time, above range 3% of the time and below range 0% of the time.  If he is currently taking Tresiba 15 units at bedtime and NovoLog 5 - 6 - 8 units TID with meals, then decrease to Tresiba 12 units at bedtime and Novolog 4 - 4 - 6 units TID with meals. If he is using different doses, please let me know.

## 2024-09-05 NOTE — TELEPHONE ENCOUNTER
Please also send to patient how to correct lows (noted below) via [employer]. Hopefully reducing the insulin should help. He should also always confirm low BG levels with a fingerstick.       Hypoglycemia instructions   Jean Guerrero  9/5/2024  [id number]    Low Blood Sugar    Steps to treat low blood sugar.    1. Test blood sugar if you have symptoms of low blood sugar:   Low Blood Sugar Symptoms:  o Sweaty  o Dizzy  o Rapid heartbeat  o Shaky  o Bad mood  o Hungry      2. Treat blood sugar less than 70 with 15 grams of fast-acting carbohydrate:   Examples of 15 grams Fast-Acting Carbohydrate:  o 4 oz juice  o 4 oz regular soda  o 3-4 glucose tablets (chew)  o 3-4 hard candies (chew)          3.  Wait 15 minutes and test your blood sugar again     4. If blood sugar is less than 100, repeat steps 2-3.    5. When your blood sugar is 100 or more, eat a snack if it will be longer than one hour until your next meal. The snack should be 15 grams of carbohydrate and a protein:   Examples of snacks:  o ½ sandwich  o 6 crackers with cheese  o Piece of fruit with cheese or peanut butter  o 6 crackers with peanut butter

## 2024-10-04 LAB
ALBUMIN/CREAT UR: <2 MG/G CREAT (ref 0–29)
BUN SERPL-MCNC: 16 MG/DL (ref 6–20)
BUN/CREAT SERPL: 16 (ref 9–20)
C PEPTIDE SERPL-MCNC: 1.4 NG/ML (ref 1.1–4.4)
CALCIUM SERPL-MCNC: 9.6 MG/DL (ref 8.7–10.2)
CHLORIDE SERPL-SCNC: 102 MMOL/L (ref 96–106)
CO2 SERPL-SCNC: 24 MMOL/L (ref 20–29)
CREAT SERPL-MCNC: 0.97 MG/DL (ref 0.76–1.27)
CREAT UR-MCNC: 121.2 MG/DL
EGFR: 103 ML/MIN/1.73
GLUCOSE SERPL-MCNC: 83 MG/DL (ref 70–99)
HBA1C MFR BLD: 6.1 % (ref 4.8–5.6)
MICROALBUMIN UR-MCNC: <3 UG/ML
POTASSIUM SERPL-SCNC: 4.7 MMOL/L (ref 3.5–5.2)
SODIUM SERPL-SCNC: 139 MMOL/L (ref 134–144)

## 2024-10-29 ENCOUNTER — OFFICE VISIT (OUTPATIENT)
Dept: ENDOCRINOLOGY | Facility: CLINIC | Age: 39
End: 2024-10-29
Payer: COMMERCIAL

## 2024-10-29 VITALS
BODY MASS INDEX: 30.57 KG/M2 | HEIGHT: 69 IN | DIASTOLIC BLOOD PRESSURE: 80 MMHG | SYSTOLIC BLOOD PRESSURE: 122 MMHG | HEART RATE: 82 BPM | OXYGEN SATURATION: 97 % | WEIGHT: 206.4 LBS

## 2024-10-29 DIAGNOSIS — E78.2 MIXED HYPERLIPIDEMIA: ICD-10-CM

## 2024-10-29 DIAGNOSIS — I10 PRIMARY HYPERTENSION: ICD-10-CM

## 2024-10-29 DIAGNOSIS — E11.9 TYPE 2 DIABETES MELLITUS WITHOUT COMPLICATION, UNSPECIFIED WHETHER LONG TERM INSULIN USE (HCC): Primary | ICD-10-CM

## 2024-10-29 DIAGNOSIS — E11.65 TYPE 2 DIABETES MELLITUS WITH HYPERGLYCEMIA, WITH LONG-TERM CURRENT USE OF INSULIN (HCC): ICD-10-CM

## 2024-10-29 DIAGNOSIS — Z79.4 TYPE 2 DIABETES MELLITUS WITH HYPERGLYCEMIA, WITH LONG-TERM CURRENT USE OF INSULIN (HCC): ICD-10-CM

## 2024-10-29 PROCEDURE — 99214 OFFICE O/P EST MOD 30 MIN: CPT | Performed by: INTERNAL MEDICINE

## 2024-10-29 PROCEDURE — 95251 CONT GLUC MNTR ANALYSIS I&R: CPT | Performed by: INTERNAL MEDICINE

## 2024-10-29 RX ORDER — ATORVASTATIN CALCIUM 10 MG/1
10 TABLET, FILM COATED ORAL DAILY
Qty: 30 TABLET | Refills: 6 | Status: SHIPPED | OUTPATIENT
Start: 2024-10-29

## 2024-10-29 RX ORDER — INSULIN ASPART 100 [IU]/ML
INJECTION, SOLUTION INTRAVENOUS; SUBCUTANEOUS
Start: 2024-10-29 | End: 2024-10-29 | Stop reason: SDUPTHER

## 2024-10-29 RX ORDER — INSULIN DEGLUDEC 100 U/ML
12 INJECTION, SOLUTION SUBCUTANEOUS
Start: 2024-10-29

## 2024-10-29 RX ORDER — INSULIN ASPART 100 [IU]/ML
INJECTION, SOLUTION INTRAVENOUS; SUBCUTANEOUS
Start: 2024-10-29

## 2024-10-29 NOTE — PATIENT INSTRUCTIONS
Hypoglycemia instructions   Jean GABRER [facility]  10/29/2024  [id number]    Low Blood Sugar    Steps to treat low blood sugar.    1. Test blood sugar if you have symptoms of low blood sugar:   Low Blood Sugar Symptoms:  o Sweaty  o Dizzy  o Rapid heartbeat  o Shaky    o Bad mood  o Hungry      2. Treat blood sugar less than 70 with 15 grams of fast-acting carbohydrate:   Examples of 15 grams Fast-Acting Carbohydrate:  o 4 oz juice  o 4 oz regular soda  o 3-4 glucose tablets (chew)  o 3-4 hard candies (chew)              3.   Wait 15 minutes and test your blood sugar again           4. If blood sugar is less than 100, repeat steps 2-3.      5. When your blood sugar is 100 or more, eat a snack if it will be longer than one hour until your next meal. The snack should be 15 grams of carbohydrate and a protein:   Examples of snacks:  o ½ sandwich  o 6 crackers with cheese  o Piece of fruit with cheese or peanut butter  o 6 crackers with peanut butter

## 2024-10-29 NOTE — PROGRESS NOTES
Jean Guerrero 39 y.o. male MRN: [id number]    Encounter: [id number]      Assessment & Plan     Assessment:  This is a 39 y.o.-year-old male with diabetes with hyperglycemia.    Plan:    Diagnoses and all orders for this visit:    Type 2 diabetes mellitus without complication, unspecified whether long term insulin use (Formerly [facility])  Lab Results   Component Value Date    HGBA1C 6.1 (H) 10/03/2024   A1c improved to 6.1%, at goal.  Patient is having low blood sugars during the day.  Will reduce NovoLog to 3 units before each meal, hold if blood sugar less than 100 mg per DL or not eating.  Continue Tresiba 12 units at bedtime  Discussed with patient to start metformin 500 mg daily with dinner  Discussed with patient to call in 1 month to download the sensor and make changes appropriately.  If blood sugars are tightly controlled, will consider stopping short acting insulin.  Repeat C-peptide in 4 months and if it is more than 2.0, and blood sugars are well-controlled will consider stopping long-acting insulin as well  Educated about hypoglycemia symptoms and treatment.  Educated about long-term complications of uncontrolled diabetes.  -     Ambulatory referral to Podiatry; Future  -     Ambulatory Referral to Ophthalmology; Future  -     C-peptide; Future  -     Hemoglobin A1C; Future  -     Albumin / creatinine urine ratio; Future  -     Basic metabolic panel; Future  -     Lipid panel; Future  -     Hepatic function panel; Future    Type 2 diabetes mellitus with hyperglycemia, with long-term current use of insulin (Formerly [facility])  Lab Results   Component Value Date    HGBA1C 6.1 (H) 10/03/2024   A1c 6.1%, at goal  Continue Tresiba 12 units at bedtime, reduce NovoLog to 3 units with meals.    - -     insulin degludec (Tresiba FlexTouch) 100 units/mL injection pen; Inject 12 Units under the skin daily at bedtime  -     Lipid panel; Future  -     Hepatic function panel; Future  -     insulin aspart (NovoLOG FlexPen) 100 UNIT/ML injection  pen; Inject 3 units before breakfast, 3 units before lunch and 3 units before dinner +scale    Mixed hyperlipidemia  Start Lipitor 10 mg daily for cardiovascular protection, LDL is more than 100 mg per DL.  Repeat lipid profile and liver enzymes in 4 months  Educated about lifestyle modifications and importance of exercise routine  -     atorvastatin (LIPITOR) 10 mg tablet; Take 1 tablet (10 mg total) by mouth daily    Primary hypertension  Blood pressure, well-controlled.  Continue current management       CC: Diabetes    History of Present Illness     HPI:  Jean Guerrero is a 39-year-old male with medical history of type 2 diabetes with long-term insulin use is here for follow-up.  Diabetes course has been stable.  He denies any recent hospitalization for hyperglycemia or hypoglycemia.    Current regimen includes Tresiba 15 units at bedtime, NovoLog 5 units with breakfast, 8 units with lunch and 10 units with dinner.  He injects in abdomen and, rotate injection sites.  He takes metformin  mg daily with dinner   he denies hypoglycemic episodes recently.    For hyperlipidemia, he takes Lipitor 20 mg daily    Pt uses continuous glucose monitor sensor by Dexcom clarity.  2 weeks to review from October 16, 2024 to October 29, 2024 reviewed.  Average glucose is 130 mg per DL, standard deviation is 33 mg per DL, GMI is 6.4%  90% blood sugars are in target range, 0% blood sugars are low percent blood sugars are high and 0% blood sugars are very high.  Patient has slight elevation of blood sugars after dinner however sometimes having low blood sugar after lunch and after breakfast  Antibodies, islet cell antibodies and zinc transporter antibodies were negative, C-peptide improved from 0.7-1.5  Lab Results   Component Value Date    CREATININE 0.97 10/03/2024       Lab Results   Component Value Date    HGBA1C 6.1 (H) 10/03/2024     Lab Results   Component Value Date    LDLCALC 103 (H) 05/31/2024           Review of  Systems   Constitutional:  Negative for activity change, diaphoresis, fatigue, fever and unexpected weight change.   HENT: Negative.     Eyes:  Negative for visual disturbance.   Respiratory:  Negative for cough, chest tightness and shortness of breath.    Cardiovascular:  Negative for chest pain, palpitations and leg swelling.   Gastrointestinal:  Negative for abdominal pain, blood in stool, constipation, diarrhea, nausea and vomiting.   Endocrine: Negative for cold intolerance, heat intolerance, polydipsia, polyphagia and polyuria.   Genitourinary:  Negative for dysuria, enuresis, frequency and urgency.   Musculoskeletal:  Negative for arthralgias and myalgias.   Skin:  Negative for pallor, rash and wound.   Allergic/Immunologic: Negative.    Neurological:  Negative for dizziness, tremors, weakness and numbness.   Hematological: Negative.    Psychiatric/Behavioral: Negative.         Historical Information   Past Medical History:   Diagnosis Date    Asthma 2005    Bloody stool     Diabetes mellitus (HCC)     Diverticulitis of colon     GERD (gastroesophageal reflux disease)     Headaches, cluster     Hypertension     borderline high, not on meds currently.    Irregular bowel habits     Irregular heart beat     palpitations, unusual chest discomfort. Dr Cheung recommended    Stomach pain      Past Surgical History:   Procedure Laterality Date    COLONOSCOPY      WISDOM TOOTH EXTRACTION       Social History   Social History     Substance and Sexual Activity   Alcohol Use Yes    Comment: social     Social History     Substance and Sexual Activity   Drug Use Never     Social History     Tobacco Use   Smoking Status Former    Current packs/day: 0.50    Average packs/day: 0.5 packs/day for 15.0 years (7.5 ttl pk-yrs)    Types: Cigarettes    Passive exposure: Never   Smokeless Tobacco Never     Family History:   Family History   Problem Relation Age of Onset    Diabetes type II Mother     Graves' disease Mother      Fibromyalgia Mother     Migraines Mother     Other Mother         Spinal stenosis    Transient ischemic attack Mother     Asthma Mother     Hypertension Mother     Rheum arthritis Sister     Asthma Brother     Hypertension Maternal Grandmother     Colorectal Cancer Maternal Grandmother     Kidney disease Maternal Grandfather     Stroke Maternal Grandfather     Heart attack Maternal Grandfather     Diabetes type II Maternal Grandfather     Diabetes type II Maternal Aunt     Hypertension Maternal Uncle     Diabetes Maternal Uncle        Meds/Allergies   Current Outpatient Medications   Medication Sig Dispense Refill    Acetaminophen 160 MG TBDP Take 160 mg by mouth if needed for mild pain      albuterol (Proventil HFA) 90 mcg/act inhaler Inhale 2 puffs every 6 (six) hours as needed for wheezing 6.7 g 0    Alcohol Swabs 70 % PADS May substitute brand based on insurance coverage. Check glucose TID. 100 each 0    aspirin (ECOTRIN LOW STRENGTH) 81 mg EC tablet Take 81 mg by mouth daily      atorvastatin (LIPITOR) 10 mg tablet Take 1 tablet (10 mg total) by mouth daily 30 tablet 6    Blood Glucose Monitoring Suppl (OneTouch Verio Reflect) w/Device KIT May substitute brand based on insurance coverage. Check glucose TID. 1 kit 0    Blood Glucose Monitoring Suppl (OneTouch Verio Reflect) w/Device KIT May substitute brand based on insurance coverage. Check glucose TID. 1 kit 0    Continuous Glucose  (Dexcom G7 ) REGINA Use 1 each continuous 1 each 0    Continuous Glucose Sensor (Dexcom G7 Sensor) Use 1 Device every 10 days 9 each 2    famotidine (PEPCID) 20 mg tablet Take 20 mg by mouth daily      glucose blood (OneTouch Verio) test strip May substitute brand based on insurance coverage. Check glucose TID. 100 each 0    glucose blood (OneTouch Verio) test strip May substitute brand based on insurance coverage. Check glucose TID. 100 each 0    hydrocortisone 2.5 % cream Apply topically 2 (two) times a day 20 g 0  "   insulin aspart (NovoLOG FlexPen) 100 UNIT/ML injection pen Inject 3 units before breakfast, 3 units before lunch and 3 units before dinner +scale      insulin degludec (Tresiba FlexTouch) 100 units/mL injection pen Inject 12 Units under the skin daily at bedtime      Insulin Pen Needle (BD Pen Needle Shu 2nd Gen) 32G X 4 MM MISC For use with insulin pen. Pharmacy may dispense brand covered by insurance. 100 each 0    Insulin Pen Needle (BD Pen Needle Shu 2nd Gen) 32G X 4 MM MISC For use with insulin pen. Pharmacy may dispense brand covered by insurance. 400 each 1    loratadine (CLARITIN) 10 mg tablet Take 10 mg by mouth daily      metFORMIN (GLUCOPHAGE-XR) 500 mg 24 hr tablet Take 1 tablet (500 mg total) by mouth daily with dinner 90 tablet 1    OneTouch Delica Lancets 33G MISC May substitute brand based on insurance coverage. Check glucose TID. 100 each 0    cholecalciferol (VITAMIN D3) 1,000 units tablet Take 1,000 Units by mouth daily Stopped taking but will resume (Patient not taking: Reported on 7/20/2024)      Omega-3 Fatty Acids (fish oil) 1,000 mg Take 1 capsule (1,000 mg total) by mouth 2 (two) times a day (Patient not taking: Reported on 1/5/2024) 60 capsule 3     No current facility-administered medications for this visit.     Allergies   Allergen Reactions    Bee Venom Anaphylaxis     Reaction Date: 30Jan2006;        Objective   Vitals: Blood pressure 122/80, pulse 82, height 5' 9\" (1.753 m), weight 93.6 kg (206 lb 6.4 oz), SpO2 97%.    Physical Exam  Vitals reviewed.   Constitutional:       General: He is not in acute distress.     Appearance: Normal appearance. He is not ill-appearing.   HENT:      Head: Normocephalic and atraumatic.      Nose: Nose normal.   Eyes:      Extraocular Movements: Extraocular movements intact.      Conjunctiva/sclera: Conjunctivae normal.   Pulmonary:      Effort: No respiratory distress.   Musculoskeletal:      Cervical back: Normal range of motion.   Neurological: "      General: No focal deficit present.      Mental Status: He is alert and oriented to person, place, and time.   Psychiatric:         Mood and Affect: Mood normal.         Behavior: Behavior normal.         The history was obtained from the review of the chart, patient.    Lab Results:   Lab Results   Component Value Date/Time    Hemoglobin A1C 6.1 (H) 10/03/2024 12:56 PM    Hemoglobin A1C 14.5 (H) 05/31/2024 05:55 PM    WBC 9.12 06/01/2024 04:38 AM    WBC 5.28 05/31/2024 04:56 PM    Hemoglobin 12.9 06/01/2024 04:38 AM    Hemoglobin 15.3 05/31/2024 04:56 PM    Hematocrit 39.2 06/01/2024 04:38 AM    Hematocrit 46.4 05/31/2024 04:56 PM    MCV 84 06/01/2024 04:38 AM    MCV 84 05/31/2024 04:56 PM    Platelets 246 06/01/2024 04:38 AM    Platelets 248 05/31/2024 04:56 PM    BUN 16 10/03/2024 12:56 PM    BUN 13 06/03/2024 05:36 AM    BUN 16 06/02/2024 05:37 AM    BUN 13 06/01/2024 04:38 AM    Potassium 4.7 10/03/2024 12:56 PM    Potassium 3.6 06/03/2024 05:36 AM    Potassium 3.7 06/02/2024 05:37 AM    Potassium 3.7 06/01/2024 04:38 AM    Chloride 102 10/03/2024 12:56 PM    Chloride 105 06/03/2024 05:36 AM    Chloride 105 06/02/2024 05:37 AM    Chloride 106 06/01/2024 04:38 AM    CO2 24 10/03/2024 12:56 PM    CO2 27 06/03/2024 05:36 AM    CO2 27 06/02/2024 05:37 AM    CO2 25 06/01/2024 04:38 AM    Creatinine 0.97 10/03/2024 12:56 PM    Creatinine 0.83 06/03/2024 05:36 AM    Creatinine 0.84 06/02/2024 05:37 AM    Creatinine 0.83 06/01/2024 04:38 AM    AST 14 06/02/2024 05:37 AM    AST 12 (L) 06/01/2024 04:38 AM    AST 15 05/31/2024 04:56 PM    ALT 14 06/02/2024 05:37 AM    ALT 13 06/01/2024 04:38 AM    ALT 18 05/31/2024 04:56 PM    Total Protein 5.8 (L) 06/02/2024 05:37 AM    Total Protein 5.7 (L) 06/01/2024 04:38 AM    Total Protein 7.1 05/31/2024 04:56 PM    Albumin 3.2 (L) 06/02/2024 05:37 AM    Albumin 3.2 (L) 06/01/2024 04:38 AM    Albumin 4.0 05/31/2024 04:56 PM    HDL, Direct 36 (L) 05/31/2024 05:55 PM     "Triglycerides 209 (H) 05/31/2024 05:55 PM           Imaging Studies: Results Review Statement: No pertinent imaging studies reviewed.    Portions of the record may have been created with voice recognition software. Occasional wrong word or \"sound a like\" substitutions may have occurred due to the inherent limitations of voice recognition software. Read the chart carefully and recognize, using context, where substitutions have occurred.    "

## 2024-11-07 ENCOUNTER — OFFICE VISIT (OUTPATIENT)
Age: 39
End: 2024-11-07
Payer: COMMERCIAL

## 2024-11-07 VITALS
WEIGHT: 206 LBS | DIASTOLIC BLOOD PRESSURE: 78 MMHG | BODY MASS INDEX: 30.51 KG/M2 | HEIGHT: 69 IN | HEART RATE: 84 BPM | SYSTOLIC BLOOD PRESSURE: 116 MMHG

## 2024-11-07 DIAGNOSIS — E11.9 TYPE 2 DIABETES MELLITUS WITHOUT COMPLICATION, UNSPECIFIED WHETHER LONG TERM INSULIN USE (HCC): ICD-10-CM

## 2024-11-07 PROCEDURE — 99203 OFFICE O/P NEW LOW 30 MIN: CPT | Performed by: STUDENT IN AN ORGANIZED HEALTH CARE EDUCATION/TRAINING PROGRAM

## 2024-11-07 NOTE — PROGRESS NOTES
"This patient was seen on 11/7/2024.    My role is Foot , Ankle, and Wound Specialist    ASSESSMENT     Diagnoses and all orders for this visit:    Type 2 diabetes mellitus without complication, unspecified whether long term insulin use (HCC)  -     Ambulatory referral to Podiatry         Problem List Items Addressed This Visit          Endocrine    Diabetes (Former[facility])       PLAN  -At-risk diabetic foot examination performed.  -I educated patient on proper foot care including wearing white socks, refraining from walking barefoot, and self-examining feet every day.   -Recommend wide toe box supportive sneakers  -Hemoglobin A1c from 10/3/2024 reviewed: 6.1%  -Basic metabolic panel from 10/3/2024 reviewed: All values within normal limits.  -Diabetic Risk category 0  -RTC in 1-year    SUBJECTIVE    Chief Complaint:  Here for at-risk diabetic foot care     Patient ID: Jean Guerrero is a 39 y.o. male.    Jean is a pleasant 39 year old male who presents today for a diabetic foot check. He states that he had a referral from his endocrinologist. He denies any pain in his feet. He states that on both of his feet he does have a hammertoe that is bothersome on occasion.         The following portions of the patient's history were reviewed and updated as appropriate: allergies, current medications, past family history, past medical history, past social history, past surgical history and problem list.    Review of Systems   Constitutional: Negative.    Respiratory: Negative.     Cardiovascular: Negative.    Gastrointestinal: Negative.    Genitourinary: Negative.    Musculoskeletal: Negative.    Skin: Negative.          OBJECTIVE      /78   Pulse 84   Ht 5' 9\" (1.753 m)   Wt 93.4 kg (206 lb)   BMI 30.42 kg/m²        Physical Exam  Constitutional:       Appearance: Normal appearance.   HENT:      Head: Normocephalic and atraumatic.   Eyes:      General:         Right eye: No discharge.         Left eye: No discharge. "   Cardiovascular:      Rate and Rhythm: Normal rate and regular rhythm.      Pulses: no weak pulses.           Dorsalis pedis pulses are 2+ on the right side and 2+ on the left side.        Posterior tibial pulses are 2+ on the right side and 2+ on the left side.   Pulmonary:      Effort: Pulmonary effort is normal.      Breath sounds: Normal breath sounds.   Feet:      Right foot:      Skin integrity: No ulcer, skin breakdown, erythema, warmth, callus or dry skin.      Left foot:      Skin integrity: No ulcer, skin breakdown, erythema, warmth, callus or dry skin.   Skin:     General: Skin is warm.      Capillary Refill: Capillary refill takes less than 2 seconds.   Neurological:      Mental Status: He is alert and oriented to person, place, and time.      Sensory: Sensation is intact. No sensory deficit.   Psychiatric:         Mood and Affect: Mood normal.           Patient's shoes and socks removed.    Right Foot/Ankle   Right Foot Inspection  Skin Exam: skin normal and skin intact. No dry skin, no warmth, no callus, no erythema, no maceration, no abnormal color, no pre-ulcer, no ulcer and no callus.     Toe Exam: ROM and strength within normal limits. No swelling, no tenderness, erythema and  no right toe deformity    Sensory   Monofilament testing: intact    Vascular  Capillary refills: < 3 seconds  The right DP pulse is 2+. The right PT pulse is 2+.     Right Toe  - Comprehensive Exam  Ecchymosis: none  Arch: normal  Hammertoes: third toe  Claw Toes: absent  Swelling: none   Tenderness: none       Left Foot/Ankle  Left Foot Inspection  Skin Exam: skin normal and skin intact. No dry skin, no warmth, no erythema, no maceration, normal color, no pre-ulcer, no ulcer and no callus.     Toe Exam: ROM and strength within normal limits. No swelling, no tenderness, no erythema and no left toe deformity.     Sensory   Monofilament testing: intact    Vascular  Capillary refills: < 3 seconds  The left DP pulse is 2+. The  left PT pulse is 2+.     Left Toe  - Comprehensive Exam  Ecchymosis: none  Arch: normal  Hammertoes: absent  Claw toes: absent  Swelling: none   Tenderness: none       Assign Risk Category  No deformity present  No loss of protective sensation  No weak pulses  Risk: 0

## 2024-11-07 NOTE — PATIENT INSTRUCTIONS
Purchase a supportive pair of sneakers such as Krueger, Hoka, Saucony, New Balance, On Cloud, Altra.  Some qualities to look for is that the shoe should bend only where the toes bend, the sole should not be too flimsy, it should have a wide toe box and a somewhat stiff heel support. Purchase a supportive over-the-counter pair of inserts such as Superfeet, powersteps, tread labs.    [employer]  [address] [facility] [address]  [phone]     [employer]  [address], [place] [address]  [phone]     [employer]  [address] [place] [address] [phone]     [employer]   [address] [facility]  [phone]     [employer]  [address], [place], [place] [address] [phone]     [employer]  [address] [place], [place] [id number] [phone]     [facility] [address] [facility]   [address] [place] [address]  [phone]     [employer]  [address] [place], [place]  [phone]     [employer] [address], [address]  [phone]     [employer]  [address] [place] [address], [address], [place] [address] [phone]

## 2024-12-13 ENCOUNTER — TELEPHONE (OUTPATIENT)
Age: 39
End: 2024-12-13

## 2024-12-13 NOTE — TELEPHONE ENCOUNTER
Sugars are very well-controlled, reviewed continuous glucose monitor sensor download.  GMI is 6.1%    Please inform patient to continue current regimen for insulin,  He should let us know if he is having low blood sugar less than 70 mg per DL persistently

## 2024-12-26 ENCOUNTER — TELEPHONE (OUTPATIENT)
Dept: ENDOCRINOLOGY | Facility: CLINIC | Age: 39
End: 2024-12-26

## 2024-12-26 LAB
LEFT EYE DIABETIC RETINOPATHY: NORMAL
RIGHT EYE DIABETIC RETINOPATHY: NORMAL

## 2024-12-26 NOTE — TELEPHONE ENCOUNTER
DOT paperwork requested to [employer]   Explained week to complete so by January 2 without holiday    See Media

## 2024-12-26 NOTE — TELEPHONE ENCOUNTER
Paper work was completed for DMV and gave it to staff   PT is currently using Tresiba 12 units at bedtime and Metformin Xr 500 mg with dinner     Zbigniew Siddiqi

## 2024-12-28 DIAGNOSIS — Z79.4 TYPE 2 DIABETES MELLITUS WITH HYPERGLYCEMIA, WITH LONG-TERM CURRENT USE OF INSULIN (HCC): ICD-10-CM

## 2024-12-28 DIAGNOSIS — E11.65 TYPE 2 DIABETES MELLITUS WITH HYPERGLYCEMIA, WITH LONG-TERM CURRENT USE OF INSULIN (HCC): ICD-10-CM

## 2024-12-30 RX ORDER — METFORMIN HYDROCHLORIDE 500 MG/1
500 TABLET, EXTENDED RELEASE ORAL
Qty: 90 TABLET | Refills: 1 | Status: SHIPPED | OUTPATIENT
Start: 2024-12-30

## 2025-04-03 DIAGNOSIS — E11.65 TYPE 2 DIABETES MELLITUS WITH HYPERGLYCEMIA, WITH LONG-TERM CURRENT USE OF INSULIN (HCC): ICD-10-CM

## 2025-04-03 DIAGNOSIS — Z79.4 TYPE 2 DIABETES MELLITUS WITH HYPERGLYCEMIA, WITH LONG-TERM CURRENT USE OF INSULIN (HCC): ICD-10-CM

## 2025-04-03 RX ORDER — ACYCLOVIR 400 MG/1
1 TABLET ORAL
Qty: 9 EACH | Refills: 2 | Status: SHIPPED | OUTPATIENT
Start: 2025-04-03

## 2025-05-23 DIAGNOSIS — E11.65 TYPE 2 DIABETES MELLITUS WITH HYPERGLYCEMIA, WITH LONG-TERM CURRENT USE OF INSULIN (HCC): ICD-10-CM

## 2025-05-23 DIAGNOSIS — Z79.4 TYPE 2 DIABETES MELLITUS WITH HYPERGLYCEMIA, WITH LONG-TERM CURRENT USE OF INSULIN (HCC): ICD-10-CM

## 2025-05-23 RX ORDER — PEN NEEDLE, DIABETIC 32GX 5/32"
NEEDLE, DISPOSABLE MISCELLANEOUS
Qty: 400 EACH | Refills: 1 | Status: SHIPPED | OUTPATIENT
Start: 2025-05-23

## 2025-06-18 DIAGNOSIS — E78.2 MIXED HYPERLIPIDEMIA: ICD-10-CM

## 2025-06-18 RX ORDER — ATORVASTATIN CALCIUM 10 MG/1
10 TABLET, FILM COATED ORAL DAILY
Qty: 30 TABLET | Refills: 0 | Status: SHIPPED | OUTPATIENT
Start: 2025-06-18

## 2025-06-25 LAB
ALBUMIN SERPL-MCNC: 4.4 G/DL (ref 4.1–5.1)
ALBUMIN/CREAT UR: <1 MG/G CREAT (ref 0–29)
ALP SERPL-CCNC: 74 IU/L (ref 44–121)
ALT SERPL-CCNC: 37 IU/L (ref 0–44)
AST SERPL-CCNC: 22 IU/L (ref 0–40)
BILIRUB DIRECT SERPL-MCNC: 0.19 MG/DL (ref 0–0.4)
BILIRUB SERPL-MCNC: 0.4 MG/DL (ref 0–1.2)
BUN SERPL-MCNC: 18 MG/DL (ref 6–20)
BUN/CREAT SERPL: 16 (ref 9–20)
C PEPTIDE SERPL-MCNC: 1.8 NG/ML (ref 1.1–4.4)
CALCIUM SERPL-MCNC: 9.5 MG/DL (ref 8.7–10.2)
CHLORIDE SERPL-SCNC: 103 MMOL/L (ref 96–106)
CHOLEST SERPL-MCNC: 130 MG/DL (ref 100–199)
CO2 SERPL-SCNC: 23 MMOL/L (ref 20–29)
CREAT SERPL-MCNC: 1.16 MG/DL (ref 0.76–1.27)
CREAT UR-MCNC: 221.5 MG/DL
EGFR: 83 ML/MIN/1.73
GLUCOSE SERPL-MCNC: 117 MG/DL (ref 70–99)
HBA1C MFR BLD: 6.8 % (ref 4.8–5.6)
HDLC SERPL-MCNC: 41 MG/DL
LDLC SERPL CALC-MCNC: 72 MG/DL (ref 0–99)
MICROALBUMIN UR-MCNC: <3 UG/ML
POTASSIUM SERPL-SCNC: 4.5 MMOL/L (ref 3.5–5.2)
PROT SERPL-MCNC: 7.7 G/DL (ref 6–8.5)
SL AMB VLDL CHOLESTEROL CALC: 17 MG/DL (ref 5–40)
SODIUM SERPL-SCNC: 142 MMOL/L (ref 134–144)
TRIGL SERPL-MCNC: 85 MG/DL (ref 0–149)

## 2025-07-13 DIAGNOSIS — E78.2 MIXED HYPERLIPIDEMIA: ICD-10-CM

## 2025-07-15 RX ORDER — ATORVASTATIN CALCIUM 10 MG/1
10 TABLET, FILM COATED ORAL DAILY
Qty: 90 TABLET | Refills: 1 | Status: SHIPPED | OUTPATIENT
Start: 2025-07-15

## 2025-07-18 ENCOUNTER — OFFICE VISIT (OUTPATIENT)
Dept: ENDOCRINOLOGY | Facility: CLINIC | Age: 40
End: 2025-07-18
Payer: COMMERCIAL

## 2025-07-18 VITALS
HEIGHT: 69 IN | WEIGHT: 205.2 LBS | SYSTOLIC BLOOD PRESSURE: 110 MMHG | BODY MASS INDEX: 30.39 KG/M2 | DIASTOLIC BLOOD PRESSURE: 60 MMHG | HEART RATE: 86 BPM | OXYGEN SATURATION: 98 %

## 2025-07-18 DIAGNOSIS — G47.33 OSA (OBSTRUCTIVE SLEEP APNEA): ICD-10-CM

## 2025-07-18 DIAGNOSIS — E66.9 OBESITY (BMI 30-39.9): ICD-10-CM

## 2025-07-18 DIAGNOSIS — E55.9 VITAMIN D DEFICIENCY: ICD-10-CM

## 2025-07-18 DIAGNOSIS — E78.2 MIXED HYPERLIPIDEMIA: ICD-10-CM

## 2025-07-18 DIAGNOSIS — E11.65 TYPE 2 DIABETES MELLITUS WITH HYPERGLYCEMIA, WITH LONG-TERM CURRENT USE OF INSULIN (HCC): Primary | ICD-10-CM

## 2025-07-18 DIAGNOSIS — Z79.4 TYPE 2 DIABETES MELLITUS WITH HYPERGLYCEMIA, WITH LONG-TERM CURRENT USE OF INSULIN (HCC): Primary | ICD-10-CM

## 2025-07-18 DIAGNOSIS — I10 PRIMARY HYPERTENSION: ICD-10-CM

## 2025-07-18 PROBLEM — E10.65 TYPE 1 DIABETES MELLITUS WITH HYPERGLYCEMIA (HCC): Status: RESOLVED | Noted: 2025-07-18 | Resolved: 2025-07-18

## 2025-07-18 PROBLEM — E10.65 TYPE 1 DIABETES MELLITUS WITH HYPERGLYCEMIA (HCC): Status: ACTIVE | Noted: 2025-07-18

## 2025-07-18 PROCEDURE — 95251 CONT GLUC MNTR ANALYSIS I&R: CPT | Performed by: INTERNAL MEDICINE

## 2025-07-18 PROCEDURE — 99214 OFFICE O/P EST MOD 30 MIN: CPT | Performed by: INTERNAL MEDICINE

## 2025-07-18 RX ORDER — TIRZEPATIDE 2.5 MG/.5ML
INJECTION, SOLUTION SUBCUTANEOUS
Qty: 6 ML | Refills: 1 | Status: SHIPPED | OUTPATIENT
Start: 2025-07-18

## 2025-07-18 RX ORDER — METFORMIN HYDROCHLORIDE 500 MG/1
1000 TABLET, EXTENDED RELEASE ORAL
Qty: 180 TABLET | Refills: 1 | Status: SHIPPED | OUTPATIENT
Start: 2025-07-18

## 2025-07-18 NOTE — ASSESSMENT & PLAN NOTE
Lab Results   Component Value Date    HGBA1C 6.8 (H) 06/24/2025     A1c 6.8%, close to the goal.  Goal for A1c is 6.5%  Blood sugars are elevated after dinner.  Will increase metformin to 1000 mg with dinner  Based on his BMI of 30.3 as well as sleep apnea will start Mounjaro 2.5 mg once a week.  Discussed the side effects and benefits.  Continue Tresiba 12 units at bedtime  Discussed to call in 1 month to review CGM download and make further adjustment.  Eventually he may be able to stop Lantus and discontinue metformin and glucagon like peptide therapy.  Most recent C-peptide is detectable +1.8  Educated to call if she develops severe nausea or vomiting  Discussed symptoms of hypoglycemia and treatment    Orders:    metFORMIN (GLUCOPHAGE-XR) 500 mg 24 hr tablet; Take 2 tablets (1,000 mg total) by mouth daily with dinner    Tirzepatide (Mounjaro) 2.5 MG/0.5ML SOAJ; Inject 2.5 mg once a week    C-peptide; Future    Basic metabolic panel; Future    Albumin / creatinine urine ratio; Future    Hemoglobin A1C; Future

## 2025-07-18 NOTE — ASSESSMENT & PLAN NOTE
Educated about lifestyle modifications and importance of weight loss.  Will start glucagon like peptide therapy in order to help with weight loss

## 2025-07-18 NOTE — PROGRESS NOTES
Name: Jean Guerrero      : 1985      MRN: [id number]  Encounter Provider: Zbigniew Siddiqi MD  Encounter Date: 2025   Encounter department: [facility] [place]    Chief Complaint   Patient presents with    Diabetes Type 2     2025 A1C= 6.8   :  Assessment & Plan  Vitamin D deficiency  Take vitamin D3 supplementation 2000 IU daily       Type 2 diabetes mellitus with hyperglycemia, with long-term current use of insulin ([facility])    Lab Results   Component Value Date    HGBA1C 6.8 (H) 2025     A1c 6.8%, close to the goal.  Goal for A1c is 6.5%  Blood sugars are elevated after dinner.  Will increase metformin to 1000 mg with dinner  Based on his BMI of 30.3 as well as sleep apnea will start Mounjaro 2.5 mg once a week.  Discussed the side effects and benefits.  Continue Tresiba 12 units at bedtime  Discussed to call in 1 month to review CGM download and make further adjustment.  Eventually he may be able to stop Lantus and discontinue metformin and glucagon like peptide therapy.  Most recent C-peptide is detectable +1.8  Educated to call if she develops severe nausea or vomiting  Discussed symptoms of hypoglycemia and treatment    Orders:    metFORMIN (GLUCOPHAGE-XR) 500 mg 24 hr tablet; Take 2 tablets (1,000 mg total) by mouth daily with dinner    Tirzepatide (Mounjaro) 2.5 MG/0.5ML SOAJ; Inject 2.5 mg once a week    C-peptide; Future    Basic metabolic panel; Future    Albumin / creatinine urine ratio; Future    Hemoglobin A1C; Future    Primary hypertension  Blood pressure is well-controlled, continue current management       Obesity (BMI 30-39.9)  Educated about lifestyle modifications and importance of weight loss.  Will start glucagon like peptide therapy in order to help with weight loss       JOAQUIN (obstructive sleep apnea)  Discussed the importance of use of CPAP machine regularly       Mixed hyperlipidemia  Continue statins, LDL improved.  Denies  side effects         Assessment & Plan              History of Present Illness   History of Present Illness    Jean Guerrero is a 40 y.o. male with type 2 diabetes seen in follow up. Reports no complications of diabetes . Denies recent severe hypoglycemic or severe hyperglycemic episodes. Denies any issues with his current regimen. Last A1C was 6.8. Denies recent illness, hospitalization or steroid use.     Home blood glucometer readings:   Before breakfast:  mg/dl   Before lunch: 120-160 mg/dl   Before dinner:120-150 mg/dl   Bedtime: 100-140 mg/dl     Uses continuous glucose monitor sensor by Dexcom clarity regularly.  14 days ago reviewed from July 5, 2025 to July 18, 2025 reviewed.  Average glucose is 155 mg per DL, GMI 7%, standard deviation is 53 mg per DL, coefficient of variation is 34.1%  75% time blood sugars are in target range, 0% times blood sugars are low or very low, 19% and blood sugars are high and 6% 10 blood sugars are very high  Interpretation of data-patient has elevated blood sugars usually from 9 PM to 12 PM especially after dinner.  During the day blood sugars are well-controlled  Current diabetic regimen:   Tresiba 12 units at bedtime, metformin 500 mg daily with dinner      Review of Systems   Constitutional:  Negative for activity change, diaphoresis, fatigue, fever and unexpected weight change.   HENT: Negative.     Eyes:  Negative for visual disturbance.   Respiratory:  Negative for cough, chest tightness and shortness of breath.    Cardiovascular:  Negative for chest pain, palpitations and leg swelling.   Gastrointestinal:  Negative for abdominal pain, blood in stool, constipation, diarrhea, nausea and vomiting.   Endocrine: Negative for cold intolerance, heat intolerance, polydipsia, polyphagia and polyuria.   Genitourinary:  Negative for dysuria, enuresis, frequency and urgency.   Musculoskeletal:  Negative for arthralgias and myalgias.   Skin:  Negative for pallor, rash and  "wound.   Allergic/Immunologic: Negative.    Neurological:  Negative for dizziness, tremors, weakness and numbness.   Hematological: Negative.    Psychiatric/Behavioral: Negative.      as per HPI    Medications Ordered Prior to Encounter[1]      Medical History Reviewed by provider this encounter:     .    Objective   /60 (Patient Position: Sitting, Cuff Size: Adult)   Pulse 86   Ht 5' 9\" (1.753 m)   Wt 93.1 kg (205 lb 3.2 oz)   SpO2 98%   BMI 30.30 kg/m²      Body mass index is 30.3 kg/m².  Wt Readings from Last 3 Encounters:   07/18/25 93.1 kg (205 lb 3.2 oz)   11/07/24 93.4 kg (206 lb)   10/29/24 93.6 kg (206 lb 6.4 oz)     Physical Exam    Physical Exam  Vitals reviewed.   Constitutional:       Appearance: Normal appearance.   HENT:      Head: Normocephalic and atraumatic.     Cardiovascular:      Rate and Rhythm: Normal rate and regular rhythm.   Pulmonary:      Effort: Pulmonary effort is normal.      Breath sounds: Normal breath sounds.     Musculoskeletal:         General: Normal range of motion.      Cervical back: Normal range of motion and neck supple.      Right lower leg: No edema.      Left lower leg: No edema.     Skin:     General: Skin is warm.      Findings: No rash.     Neurological:      General: No focal deficit present.      Mental Status: He is alert and oriented to person, place, and time.     Psychiatric:         Mood and Affect: Mood normal.         Behavior: Behavior normal.       Last Eye Exam: 12/26/2024  Last Foot Exam: 11/07/2024  Health Maintenance   Topic Date Due    Diabetic Foot Exam  11/07/2025    Diabetic Eye Exam  12/26/2026       Results    Labs: I have reviewed pertinent labs including:   Lab Results   Component Value Date    HGBA1C 6.8 (H) 06/24/2025    HGBA1C 6.1 (H) 10/03/2024    HGBA1C 14.5 (H) 05/31/2024       Latest Reference Range & Units 06/24/25 08:37   Sodium 134 - 144 mmol/L 142   Potassium 3.5 - 5.2 mmol/L 4.5   Chloride 96 - 106 mmol/L 103   Carbon " Dioxide 20 - 29 mmol/L 23   BUN 6 - 20 mg/dL 18   Creatinine 0.76 - 1.27 mg/dL 1.16   SL AMB BUN/CREATININE RATIO 9 - 20  16   GLUCOSE 70 - 99 mg/dL 117 (H)   AST 0 - 40 IU/L 22   ALT 0 - 44 IU/L 37   Total Protein 6.0 - 8.5 g/dL 7.7   Albumin 4.1 - 5.1 g/dL 4.4   Total Bilirubin 0.0 - 1.2 mg/dL 0.4   GFR, Calculated >59 mL/min/1.73 83   Cholesterol 100 - 199 mg/dL 130   Triglycerides 0 - 149 mg/dL 85   HDL >39 mg/dL 41   LDL Calculated 0 - 99 mg/dL 72   VLDL Cholesterol Linus 5 - 40 mg/dL 17   ALKALINE PHOSPHATASE ISOENZYMES 44 - 121 IU/L 74   Hemoglobin A1C 4.8 - 5.6 % 6.8 (H)   C-PEPTIDE 1.1 - 4.4 ng/mL 1.8   CALCIUM 8.7 - 10.2 mg/dL 9.5   EXT Creatinine Urine Not Estab. mg/dL 221.5   Albumin Creat Ratio 0 - 29 mg/g creat <1       There are no Patient Instructions on file for this visit.    Discussed with the patient and all questioned fully answered. He will call me if any problems arise.             [1]   Current Outpatient Medications on File Prior to Visit   Medication Sig Dispense Refill    Acetaminophen 160 MG TBDP Take 160 mg by mouth if needed for mild pain      albuterol (Proventil HFA) 90 mcg/act inhaler Inhale 2 puffs every 6 (six) hours as needed for wheezing 6.7 g 0    Alcohol Swabs 70 % PADS May substitute brand based on insurance coverage. Check glucose TID. 100 each 0    atorvastatin (LIPITOR) 10 mg tablet TAKE 1 TABLET BY MOUTH EVERY DAY 90 tablet 1    BD Pen Needle Shu 2nd Gen 32G X 4 MM MISC FOR USE WITH INSULIN PEN. PHARMACY MAY DISPENSE BRAND COVERED BY INSURANCE. 400 each 1    Blood Glucose Monitoring Suppl (OneTouch Verio Reflect) w/Device KIT May substitute brand based on insurance coverage. Check glucose TID. 1 kit 0    Blood Glucose Monitoring Suppl (OneTouch Verio Reflect) w/Device KIT May substitute brand based on insurance coverage. Check glucose TID. 1 kit 0    Continuous Glucose  (Dexcom G7 ) REGINA Use 1 each continuous 1 each 0    Continuous Glucose Sensor (Dexcom G7  Sensor) USE 1 DEVICE EVERY 10 DAYS 9 each 2    famotidine (PEPCID) 20 mg tablet Take 20 mg by mouth in the morning.      glucose blood (OneTouch Verio) test strip May substitute brand based on insurance coverage. Check glucose TID. 100 each 0    glucose blood (OneTouch Verio) test strip May substitute brand based on insurance coverage. Check glucose TID. 100 each 0    hydrocortisone 2.5 % cream Apply topically 2 (two) times a day 20 g 0    insulin degludec (Tresiba FlexTouch) 100 units/mL injection pen Inject 12 Units under the skin daily at bedtime      Insulin Pen Needle (BD Pen Needle Shu 2nd Gen) 32G X 4 MM MISC For use with insulin pen. Pharmacy may dispense brand covered by insurance. 100 each 0    loratadine (CLARITIN) 10 mg tablet Take 10 mg by mouth in the morning.      metFORMIN (GLUCOPHAGE-XR) 500 mg 24 hr tablet TAKE 1 TABLET BY MOUTH EVERY DAY WITH DINNER 90 tablet 1    OneTouch Delica Lancets 33G MISC May substitute brand based on insurance coverage. Check glucose TID. 100 each 0    aspirin (ECOTRIN LOW STRENGTH) 81 mg EC tablet Take 81 mg by mouth daily (Patient not taking: Reported on 7/18/2025)      cholecalciferol (VITAMIN D3) 1,000 units tablet Take 1,000 Units by mouth daily Stopped taking but will resume (Patient not taking: Reported on 7/20/2024)      Omega-3 Fatty Acids (fish oil) 1,000 mg Take 1 capsule (1,000 mg total) by mouth 2 (two) times a day (Patient not taking: Reported on 1/5/2024) 60 capsule 3     No current facility-administered medications on file prior to visit.

## 2025-07-22 ENCOUNTER — TELEPHONE (OUTPATIENT)
Dept: ENDOCRINOLOGY | Facility: CLINIC | Age: 40
End: 2025-07-22

## 2025-07-23 NOTE — TELEPHONE ENCOUNTER
PA for (Dexcom G7 Sensor)SUBMITTED to [employer] [facility]    via    [x]CMM-KEY: [id number]  []Surescripts-Case ID #   []Availity-Auth ID # NDC #   []Faxed to plan   []Other website   []Phone call Case ID #     [x]PA sent as URGENT    All office notes, labs and other pertaining documents and studies sent. Clinical questions answered. Awaiting determination from insurance company.     Turnaround time for your insurance to make a decision on your Prior Authorization can take 7-21 business days.

## 2025-07-23 NOTE — TELEPHONE ENCOUNTER
PA for  (Dexcom G7 Sensor) APPROVED     Date(s) approved 7/23/25-7/23/26    Case #    Patient advised by          []Peer39hart Message  []Phone call   [x]LMOM  []L/M to call office as no active Communication consent on file  []Unable to leave detailed message as VM not approved on Communication consent       Pharmacy advised by    [x]Fax  []Phone call  []Secure Chat    Specialty Pharmacy    []     Approval letter scanned into Media No NOT AVAILABLE AT TIME OF APPROVAL

## 2025-07-29 ENCOUNTER — TELEPHONE (OUTPATIENT)
Dept: ENDOCRINOLOGY | Facility: CLINIC | Age: 40
End: 2025-07-29

## 2025-07-29 DIAGNOSIS — E11.65 TYPE 2 DIABETES MELLITUS WITH HYPERGLYCEMIA, WITH LONG-TERM CURRENT USE OF INSULIN (HCC): Primary | ICD-10-CM

## 2025-07-29 DIAGNOSIS — Z79.4 TYPE 2 DIABETES MELLITUS WITH HYPERGLYCEMIA, WITH LONG-TERM CURRENT USE OF INSULIN (HCC): Primary | ICD-10-CM

## 2025-08-04 ENCOUNTER — TELEPHONE (OUTPATIENT)
Age: 40
End: 2025-08-04

## 2025-08-05 RX ORDER — TIRZEPATIDE 2.5 MG/.5ML
INJECTION, SOLUTION SUBCUTANEOUS
Qty: 6 ML | Refills: 1 | Status: SHIPPED | OUTPATIENT
Start: 2025-08-05